# Patient Record
Sex: MALE | Race: WHITE | NOT HISPANIC OR LATINO | Employment: FULL TIME | URBAN - METROPOLITAN AREA
[De-identification: names, ages, dates, MRNs, and addresses within clinical notes are randomized per-mention and may not be internally consistent; named-entity substitution may affect disease eponyms.]

---

## 2017-03-07 DIAGNOSIS — C73 MALIGNANT NEOPLASM OF THYROID GLAND (H): ICD-10-CM

## 2017-03-07 DIAGNOSIS — R25.1 TREMOR: ICD-10-CM

## 2017-03-07 DIAGNOSIS — E89.0 POSTSURGICAL HYPOTHYROIDISM: ICD-10-CM

## 2017-03-07 RX ORDER — LEVOTHYROXINE SODIUM 125 UG/1
TABLET ORAL
Qty: 180 TABLET | Refills: 3 | Status: SHIPPED
Start: 2017-03-07 | End: 2017-12-20

## 2017-03-07 NOTE — TELEPHONE ENCOUNTER
levothyroxine (SYNTHROID, LEVOTHROID) 125 MCG tablet      Last Written Prescription Date:  3/11/2016  Last Fill Quantity: 180,   # refills: 2  Last Office Visit : 4/18/2016  Future Office visit:  Not scheduled    Routing refill request to provider for review/approval because:  Per protocol, provider request.

## 2017-12-20 ENCOUNTER — OFFICE VISIT (OUTPATIENT)
Dept: ENDOCRINOLOGY | Facility: CLINIC | Age: 42
End: 2017-12-20
Payer: COMMERCIAL

## 2017-12-20 VITALS
HEART RATE: 56 BPM | WEIGHT: 222.9 LBS | BODY MASS INDEX: 25.79 KG/M2 | SYSTOLIC BLOOD PRESSURE: 135 MMHG | HEIGHT: 78 IN | DIASTOLIC BLOOD PRESSURE: 82 MMHG

## 2017-12-20 DIAGNOSIS — E89.0 POSTSURGICAL HYPOTHYROIDISM: ICD-10-CM

## 2017-12-20 DIAGNOSIS — C73 PAPILLARY THYROID CARCINOMA (H): Primary | ICD-10-CM

## 2017-12-20 DIAGNOSIS — C73 PAPILLARY THYROID CARCINOMA (H): ICD-10-CM

## 2017-12-20 DIAGNOSIS — R25.1 TREMOR: ICD-10-CM

## 2017-12-20 LAB
T4 FREE SERPL-MCNC: 1.52 NG/DL (ref 0.76–1.46)
TSH SERPL DL<=0.005 MIU/L-ACNC: 0.02 MU/L (ref 0.4–4)

## 2017-12-20 RX ORDER — LEVOTHYROXINE SODIUM 125 UG/1
TABLET ORAL
Qty: 180 TABLET | Refills: 3 | Status: SHIPPED | OUTPATIENT
Start: 2017-12-20 | End: 2019-02-04

## 2017-12-20 RX ORDER — LEVOTHYROXINE SODIUM 125 UG/1
TABLET ORAL
Qty: 180 TABLET | Refills: 3 | Status: SHIPPED | OUTPATIENT
Start: 2017-12-20 | End: 2017-12-20

## 2017-12-20 NOTE — LETTER
12/20/2017       RE: José Augustine  [address] [facility] [address]     Dear Colleague,    Thank you for referring your patient, José Augustine, to the [facility] ENDOCRINOLOGY at [facility]. Please see a copy of my visit note below.    Endocrinology Consult Note    Attending ASSESSMENT/PLAN:     1. papillary thyroid carcinoma, 1.5 cm with extrathyroid extension, node positive, BRAF mutant positive.    pT3, pN1a, pMx, cM0  JESUS recurrence risk intermediate  Labs today  Neck US   RTC 1 year    2.  Post surgical hypothyroidism.    Treat to TSH < 0.4  Refilled LT4 x 1 year    3.  Tremor - as per #2      Nettie Carolina MD      Cc/ HISTORY OF PRESENT ILLNESS José presents for follow up ofthyroid cancer, post surgical hypothyroidism.He had + BRAF mutation on the FNAB specimen. He was last seen by me 4/16.      On 12/9/13 he was treated with Total thyroidectomy, central neck dissection removing right PCT 1.5 cm, extending to extrathyroidal fatty tissue (foca ETE).  6/10 level 6 LN were positive for PCT.   MACIS:Total 4.55 if invasion and no distant  pT3, pN1a, pMx   On 8/13/14 he was treated with 131I 100 mCi after 2 dose Thyrogen stimulation.  On 11/12/15 thyrogen stimulated 3 mCi 123I TBS was negative. Day 5 thyrogen stimulated Tg was  048 with TSH 16.4.  At our last appt he was on LT4 250 mcg/day. After that, we reduced the dose to 250 * 6.5/week, divided .       We have the following relevant lab data:  6/30/11 TSh 0.99  10/3/12: vitamin D 16.4  9/4/13: TSh 1.07, calcium 9.1, BUN17, creatinin0.85, alk phos 54, vitamin D 41.7  1/21/14: Tg 0.48, JESUS < 0.4, TSH 9.77  5/20/14: Tg 0.31, JESUS < 0.4, TSH1.02  8/13/14: Tg 0.89, JESUS < 0.4,  -  8/18/14 post therapy scan:  neck uptake  10/14/14: Tg 0.14, JESUS < 0.4, TSH 1.19  12/19/14 TSH 0.3, free T4 1.39  4/14/15: Tg 0.17, JESUS < 0.4, TSH 0.07  11/13/15: Tg 0.48, JESUS < 0.4, TSH 16,40  4/18/16: Tg 0.14, JESUS < 0.4,    Neck US  2016 compared with 4/15  Right level 4 # 1 0.6 x 0.3 x 0.4 (was 0.5 x 0.3 x 0.4) -  Right level 4 # 2 0.8 x 0.3 x 0.7 ( was 0.8 x 0.3 x 0.8 )  Right level 5 # 1 0.6 x 0.4 x 0.7 hilum (was 0.5 x 0.4 x 0.6 cm  )  Left level 3 # 1 0.9 x0.3 x 1.5 ( was 1.0 x 0.4 x 1.3 cm   Left level 3 # 2  0.6 x0.2 x 0.9 (was0.4 x 0.2 x 0.5 cm     REVIEW OF SYSTEMS  Feels normal   Energy is good  No sleep problem  Cardiac: negative  Respiratory: negative  GI: negative  No pains    Past Medical History  Past Medical History:   Diagnosis Date     Allergic state      Asthma age 12     Eczema      Papillary thyroid carcinoma (H) 12/9/13    right 1.5 cm, ETE, LN+, BRAF+     Postsurgical hypothyroidism 12/9/13     Thyroid nodule        Past Surgical History:   Procedure Laterality Date     ORTHOPEDIC SURGERY      shoulder labral repair     THYROIDECTOMY  12/9/2013    Procedure: THYROIDECTOMY;  Total Thyroidectomy, Central Neck Dissection ;  Surgeon: Pillo Henley MD;  Location: [facility] OR       Medications  Current Outpatient Prescriptions   Medication Sig Dispense Refill     VITAMIN D, CHOLECALCIFEROL, PO Take 2,000 Units by mouth daily       Multiple Vitamins-Minerals (MULTIVITAMIN ADULT PO)        Probiotic Product (PROBIOTIC ADVANCED PO)        levothyroxine (SYNTHROID/LEVOTHROID) 125 MCG tablet Monday-Saturday: (2 tablet/day); Sunday: (1 tablet) 180 tablet 3     albuterol (PROAIR HFA, PROVENTIL HFA, VENTOLIN HFA) 108 (90 BASE) MCG/ACT inhaler Inhale 2 puffs into the lungs every 6 hours as needed for shortness of breath / dyspnea or wheezing       beclomethasone (QVAR) 40 MCG/ACT Inhaler Inhale 1 puff into the lungs daily       [DISCONTINUED] levothyroxine (SYNTHROID/LEVOTHROID) 125 MCG tablet Monday-Saturday: (2 tablet/day);   Sunday: (1 tablet) 180 tablet 3     [DISCONTINUED] levothyroxine (SYNTHROID/LEVOTHROID) 125 MCG tablet Monday-Saturday: (2 tablet/day);   Sunday: (1 tablet) 180 tablet 3     Allergies  No Known Allergies      Family  "History  family history includes CANCER in his father. There is no history of Thyroid Cancer or Thyroid Disease.    Social History  Social History   Substance Use Topics     Smoking status: Never Smoker     Smokeless tobacco: Never Used     Alcohol use Yes      Comment: weekly       Physical Exam  /82 (BP Location: Right arm, Patient Position: Sitting, Cuff Size: Adult Regular)  Pulse 56  Ht 2.032 m (6' 8\")  Wt 101.1 kg (222 lb 14.4 oz)  BMI 24.49 kg/m2  Body mass index is 24.49 kg/(m^2).  GENERAL : pleasant young man  In no apparent distress.  He is alone  SKIN: Normal color, normal temperature,   NECK: no palpable masses  EYES: PER,  No scleral icterus,   RESP: Lungs clear to auscultation bilaterally  CARDIAC: Regular rate and rhythm, normal S1 S2, without murmurs, rubs or gallops    NEURO: awake, alert, responds appropriately to questions.   Moves all extremities;  + subtle  tremor of outstretched hand is again seen  EXTREMITIES: No clubbing, cyanosis or edema.      DATA REVIEW    ENDO THYROID LABS-[facility] Latest Ref Rng & Units 6/27/2016 4/18/2016   TSH 0.40 - 4.00 mU/L 0.01 (L) 0.02 (L)   T4 FREE 0.76 - 1.46 ng/dL 1.48 (H) 1.57 (H)     ENDO THYROID LABS-[facility] Latest Ref Rng & Units 11/13/2015   TSH 0.40 - 4.00 mU/L 16.40 (H)   T4 FREE 0.76 - 1.46 ng/dL        "

## 2017-12-20 NOTE — PROGRESS NOTES
Endocrinology Consult Note    Attending ASSESSMENT/PLAN:     1. papillary thyroid carcinoma, 1.5 cm with extrathyroid extension, node positive, BRAF mutant positive.    pT3, pN1a, pMx, cM0  JESUS recurrence risk intermediate  Labs today  Neck US   RTC 1 year    Addendum: review of neck US images dated 12/26/17: I agree the study is negative     2.  Post surgical hypothyroidism.    Treat to TSH < 0.4  Refilled LT4 x 1 year    3.  Tremor - as per #2      Nettie Carolina MD      Cc/ HISTORY OF PRESENT ILLNESS José presents for follow up ofthyroid cancer, post surgical hypothyroidism.He had + BRAF mutation on the FNAB specimen. He was last seen by me 4/16.      On 12/9/13 he was treated with Total thyroidectomy, central neck dissection removing right PCT 1.5 cm, extending to extrathyroidal fatty tissue (foca ETE).  6/10 level 6 LN were positive for PCT.   MACIS:Total 4.55 if invasion and no distant  pT3, pN1a, pMx   On 8/13/14 he was treated with 131I 100 mCi after 2 dose Thyrogen stimulation.  On 11/12/15 thyrogen stimulated 3 mCi 123I TBS was negative. Day 5 thyrogen stimulated Tg was  048 with TSH 16.4.  At our last appt he was on LT4 250 mcg/day. After that, we reduced the dose to 250 * 6.5/week, divided .       We have the following relevant lab data:  6/30/11 TSh 0.99  10/3/12: vitamin D 16.4  9/4/13: TSh 1.07, calcium 9.1, BUN17, creatinin0.85, alk phos 54, vitamin D 41.7  1/21/14: Tg 0.48, JESUS < 0.4, TSH 9.77  5/20/14: Tg 0.31, JESUS < 0.4, TSH1.02  8/13/14: Tg 0.89, JESUS < 0.4,  -  8/18/14 post therapy scan:  neck uptake  10/14/14: Tg 0.14, JESUS < 0.4, TSH 1.19  12/19/14 TSH 0.3, free T4 1.39  4/14/15: Tg 0.17, JESUS < 0.4, TSH 0.07  11/13/15: Tg 0.48, JESUS < 0.4, TSH 16,40  4/18/16: Tg 0.14, JESUS < 0.4,  12/20/17: Tg 0.16, JESUS < 0.4, TSH 0.02, free T4 1.57- results followed appt and were not discussed at appt.     Neck US 2016 compared with 4/15  Right level 4 # 1 0.6 x 0.3 x 0.4 (was 0.5 x 0.3 x 0.4) -  Right  level 4 # 2 0.8 x 0.3 x 0.7 ( was 0.8 x 0.3 x 0.8 )  Right level 5 # 1 0.6 x 0.4 x 0.7 hilum (was 0.5 x 0.4 x 0.6 cm  )  Left level 3 # 1 0.9 x0.3 x 1.5 ( was 1.0 x 0.4 x 1.3 cm   Left level 3 # 2  0.6 x0.2 x 0.9 (was0.4 x 0.2 x 0.5 cm     REVIEW OF SYSTEMS  Feels normal   Energy is good  No sleep problem  Cardiac: negative  Respiratory: negative  GI: negative  No pains    Past Medical History  Past Medical History:   Diagnosis Date     Allergic state      Asthma age 12     Eczema      Papillary thyroid carcinoma (H) 12/9/13    right 1.5 cm, ETE, LN+, BRAF+     Postsurgical hypothyroidism 12/9/13     Thyroid nodule        Past Surgical History:   Procedure Laterality Date     ORTHOPEDIC SURGERY      shoulder labral repair     THYROIDECTOMY  12/9/2013    Procedure: THYROIDECTOMY;  Total Thyroidectomy, Central Neck Dissection ;  Surgeon: Pillo Henley MD;  Location:  OR       Medications  Current Outpatient Prescriptions   Medication Sig Dispense Refill     VITAMIN D, CHOLECALCIFEROL, PO Take 2,000 Units by mouth daily       Multiple Vitamins-Minerals (MULTIVITAMIN ADULT PO)        Probiotic Product (PROBIOTIC ADVANCED PO)        levothyroxine (SYNTHROID/LEVOTHROID) 125 MCG tablet Monday-Saturday: (2 tablet/day); Sunday: (1 tablet) 180 tablet 3     albuterol (PROAIR HFA, PROVENTIL HFA, VENTOLIN HFA) 108 (90 BASE) MCG/ACT inhaler Inhale 2 puffs into the lungs every 6 hours as needed for shortness of breath / dyspnea or wheezing       beclomethasone (QVAR) 40 MCG/ACT Inhaler Inhale 1 puff into the lungs daily       [DISCONTINUED] levothyroxine (SYNTHROID/LEVOTHROID) 125 MCG tablet Monday-Saturday: (2 tablet/day);   Sunday: (1 tablet) 180 tablet 3     [DISCONTINUED] levothyroxine (SYNTHROID/LEVOTHROID) 125 MCG tablet Monday-Saturday: (2 tablet/day);   Sunday: (1 tablet) 180 tablet 3     Allergies  No Known Allergies      Family History  family history includes CANCER in his father. There is no history of Thyroid Cancer  "or Thyroid Disease.    Social History  Social History   Substance Use Topics     Smoking status: Never Smoker     Smokeless tobacco: Never Used     Alcohol use Yes      Comment: weekly       Physical Exam  /82 (BP Location: Right arm, Patient Position: Sitting, Cuff Size: Adult Regular)  Pulse 56  Ht 2.032 m (6' 8\")  Wt 101.1 kg (222 lb 14.4 oz)  BMI 24.49 kg/m2  Body mass index is 24.49 kg/(m^2).  GENERAL : pleasant young man  In no apparent distress.  He is alone  SKIN: Normal color, normal temperature,   NECK: no palpable masses  EYES: PER,  No scleral icterus,   RESP: Lungs clear to auscultation bilaterally  CARDIAC: Regular rate and rhythm, normal S1 S2, without murmurs, rubs or gallops    NEURO: awake, alert, responds appropriately to questions.   Moves all extremities;  + subtle  tremor of outstretched hand is again seen  EXTREMITIES: No clubbing, cyanosis or edema.      DATA REVIEW    Results for LALA SANTANA (MRN [id number]) as of 12/28/2017 15:19   Ref. Range 12/20/2017 15:37   T4 Free Latest Ref Range: 0.76 - 1.46 ng/dL 1.52 (H)   TSH Latest Ref Range: 0.40 - 4.00 mU/L 0.02 (L)     ENDO THYROID LABS-[facility] Latest Ref Rng & Units 6/27/2016 4/18/2016   TSH 0.40 - 4.00 mU/L 0.01 (L) 0.02 (L)   T4 FREE 0.76 - 1.46 ng/dL 1.48 (H) 1.57 (H)     ENDO THYROID LABS-[facility] Latest Ref Rng & Units 11/13/2015   TSH 0.40 - 4.00 mU/L 16.40 (H)   T4 FREE 0.76 - 1.46 ng/dL        EXAMINATION: US HEAD NECK SOFT TISSUE, 12/26/2017 9:49 AM      COMPARISON: 4/18/2016     HISTORY: Papillary thyroid carcinoma (H); Postsurgical hypothyroidism;  Tremor     FINDINGS:     Lymph nodes are measured bilaterally with measurements given in  craniocaudal, transverse and AP dimensions as follows:     Right:  Level 1: None  Level 2: 3 lymph nodes, with lymph node #1 measuring 0.9 x 0.5 x 1.1  cm, previously 1.2 x 0.5 x 1.5 cm. Lymph node #2 measuring 1.1 x 0.6 x  1.6 cm, previously 1.1 x 0.5 x 1.5 cm. Lymph node #3 measuring 1.1 " x  0.5 x 1.1 cm, previously 1.4 x 0.4 x 0.9 cm.  Level 3: Lymph node #1 measuring 1.4 x 0.5 x 2.6 cm, previously not  seen. Lymph node #2 insignificant by size criteria.  Level 4: None  Level 5: None  Level 6: None     Left:  Level 1: None  Level 2: 2 lymph nodes, with lymph node #1 measuring 1.1 x 0.8 x 1.5  cm, previously 1.1 x 0.6 x 1.1 cm. lymph node #2 measuring 1.3 x 0.5 x  2.0 cm, previously 1.4 x 0.5 x 2.0 cm.   Level 3: 2 small lymph nodes, insignificant by size criteria.  Level 4: None  Level 5: None  Level 6: None     All the lymph nodes demonstrate normal fatty hilum. No soft tissue  lesion at the thyroid bed.         IMPRESSION: Soft tissue neck ultrasound with lymph node measurements  as described above. All the lymph nodes show normal fatty hilum.     I have personally reviewed the examination and initial interpretation  and I agree with the findings.     RICK HANSON MD

## 2017-12-20 NOTE — MR AVS SNAPSHOT
After Visit Summary   12/20/2017    José Augustine    MRN: [id number]           Patient Information     Date Of Birth          1975        Visit Information        Provider Department      12/20/2017 3:00 PM Nettie Carolina MD [facility] Endocrinology        Today's Diagnoses     Papillary thyroid carcinoma    -  1    Postsurgical hypothyroidism        Tremor          Care Instructions    You should continue to see me about once/year or sooner if needed          Follow-ups after your visit        Follow-up notes from your care team     Return in about 1 year (around 12/20/2018).      Your next 10 appointments already scheduled     Dec 20, 2017  4:00 PM CST   LAB with [facility] ([facility])    [address] [place] [address] [id number] [phone]           Please do not eat 10-12 hours before your appointment if you are coming in fasting for labs on lipids, cholesterol, or glucose (sugar). This does not apply to pregnant women. Water, hot tea and black coffee (with nothing added) are okay. Do not drink other fluids, diet soda or chew gum.            Dec 26, 2017  9:15 AM CST   (Arrive by 9:00 AM)   US HEAD NECK SOFT TISSUE with [address] [facility] ([facility])    [address] [place] [address] [id number] [phone]           Please bring a list of your medicines (including vitamins, minerals and over-the-counter drugs). Also, tell your doctor about any allergies you may have. Wear comfortable clothes and leave your valuables at home.  You do not need to do anything special to prepare for your exam.  Please call the Imaging Department at your exam site with any questions.              Future tests that were ordered for you today     Open Future Orders        Priority Expected Expires Ordered    US head neck soft tissue Routine  7/18/2018 12/20/2017    TSH Routine  12/20/2018  "12/20/2017    T4 free Routine  12/20/2018 12/20/2017    Thyroglobulin (Total, antibody & recovery %) Routine  12/20/2018 12/20/2017            Who to contact     Please call your clinic at [phone] to:    Ask questions about your health    Make or cancel appointments    Discuss your medicines    Learn about your test results    Speak to your doctor   If you have compliments or concerns about an experience at your clinic, or if you wish to file a complaint, please contact [facility] Patient Relations at [phone] or email us at [email or web address]         Additional Information About Your Visit        [employer] Information     [employer] gives you secure access to your electronic health record. If you see a primary care provider, you can also send messages to your care team and make appointments. If you have questions, please call your primary care clinic.  If you do not have a primary care provider, please call [phone] and they will assist you.      [employer] is an electronic gateway that provides easy, online access to your medical records. With [employer], you can request a clinic appointment, read your test results, renew a prescription or communicate with your care team.     To access your existing account, please contact your [facility] or call [phone] for assistance.        Care EveryWhere ID     This is your Care EveryWhere ID. This could be used by other organizations to access your [facility] medical records  [id number]        Your Vitals Were     Pulse Height BMI (Body Mass Index)             56 2.032 m (6' 8\") 24.49 kg/m2          Blood Pressure from Last 3 Encounters:   12/20/17 135/82   04/18/16 117/74   11/17/15 113/73    Weight from Last 3 Encounters:   12/20/17 101.1 kg (222 lb 14.4 oz)   04/18/16 94.8 kg (209 lb)   11/17/15 93.6 kg (206 lb 6.4 oz)                 Today's Medication Changes          These changes are " accurate as of: 12/20/17  3:21 PM.  If you have any questions, ask your nurse or doctor.               Start taking these medicines.        Dose/Directions    levothyroxine 125 MCG tablet   Commonly known as:  SYNTHROID/LEVOTHROID   Used for:  Postsurgical hypothyroidism, Tremor, Papillary thyroid carcinoma (H)   Started by:  Nettie Carolina MD        Monday-Saturday: (2 tablet/day); Sunday: (1 tablet)   Quantity:  180 tablet   Refills:  3            Where to get your medicines      These medications were sent to [facility] home delivery ([facility] #[id number]) - [facility] [place] - [address]  [address], [place] [address]     Phone:  [phone]     levothyroxine 125 MCG tablet                Primary Care Provider Office Phone # Fax #    Windy Torrez [phone]       [facility] [address]  [facility] [address]        Equal Access to Services     ZAINAB RODRIGUEZ : Hadii kimberly quintero hadasho Solannyali, waaxda luqadaha, qaybta kaalmada adeegyada, sonia bradshaw . So [facility] [phone].    ATENCIÓN: Si habla español, tiene a bernal disposición servicios gratuitos de asistencia lingüística. Mariahtaylor al [phone].    We comply with applicable federal civil rights laws and [place] laws. We do not discriminate on the basis of race, color, national origin, age, disability, sex, sexual orientation, or gender identity.            Thank you!     Thank you for choosing [facility] ENDOCRINOLOGY  for your care. Our goal is always to provide you with excellent care. Hearing back from our patients is one way we can continue to improve our services. Please take a few minutes to complete the written survey that you may receive in the mail after your visit with us. Thank you!             Your Updated Medication List - Protect others around you: Learn how to safely use, store and throw away your medicines at www.disposemymeds.org.          This list is accurate as of: 12/20/17   3:21 PM.  Always use your most recent med list.                   Brand Name Dispense Instructions for use Diagnosis    albuterol 108 (90 BASE) MCG/ACT Inhaler    PROAIR HFA/PROVENTIL HFA/VENTOLIN HFA     Inhale 2 puffs into the lungs every 6 hours as needed for shortness of breath / dyspnea or wheezing    Papillary thyroid carcinoma (H), Postsurgical hypothyroidism       beclomethasone 40 MCG/ACT Inhaler    QVAR     Inhale 1 puff into the lungs daily    Papillary thyroid carcinoma (H), Postsurgical hypothyroidism       levothyroxine 125 MCG tablet    SYNTHROID/LEVOTHROID    180 tablet    Monday-Saturday: (2 tablet/day); Sunday: (1 tablet)    Postsurgical hypothyroidism, Tremor, Papillary thyroid carcinoma (H)       MULTIVITAMIN ADULT PO       Papillary thyroid carcinoma (H), Postsurgical hypothyroidism, Tremor       PROBIOTIC ADVANCED PO       Papillary thyroid carcinoma (H), Postsurgical hypothyroidism, Tremor       VITAMIN D (CHOLECALCIFEROL) PO      Take 2,000 Units by mouth daily    Papillary thyroid carcinoma (H), Postsurgical hypothyroidism, Tremor

## 2017-12-20 NOTE — NURSING NOTE
"Chief Complaint   Patient presents with     RECHECK     POSTSURGICAL HYPOTHYROIDISM F/U        Initial /82 (BP Location: Right arm, Patient Position: Sitting, Cuff Size: Adult Regular)  Pulse 56  Ht 2.032 m (6' 8\")  Wt 101.1 kg (222 lb 14.4 oz)  BMI 24.49 kg/m2 Estimated body mass index is 24.49 kg/(m^2) as calculated from the following:    Height as of this encounter: 2.032 m (6' 8\").    Weight as of this encounter: 101.1 kg (222 lb 14.4 oz).  Medication Reconciliation: complete    "

## 2017-12-26 ENCOUNTER — RADIANT APPOINTMENT (OUTPATIENT)
Dept: ULTRASOUND IMAGING | Facility: CLINIC | Age: 42
End: 2017-12-26
Payer: COMMERCIAL

## 2017-12-26 DIAGNOSIS — R25.1 TREMOR: ICD-10-CM

## 2017-12-26 DIAGNOSIS — E89.0 POSTSURGICAL HYPOTHYROIDISM: ICD-10-CM

## 2017-12-26 DIAGNOSIS — C73 PAPILLARY THYROID CARCINOMA (H): ICD-10-CM

## 2017-12-28 LAB — LAB SCANNED RESULT: NORMAL

## 2019-02-04 ENCOUNTER — OFFICE VISIT (OUTPATIENT)
Dept: ENDOCRINOLOGY | Facility: CLINIC | Age: 44
End: 2019-02-04
Payer: COMMERCIAL

## 2019-02-04 VITALS
BODY MASS INDEX: 27.69 KG/M2 | HEART RATE: 75 BPM | SYSTOLIC BLOOD PRESSURE: 142 MMHG | HEIGHT: 78 IN | WEIGHT: 239.3 LBS | DIASTOLIC BLOOD PRESSURE: 76 MMHG

## 2019-02-04 DIAGNOSIS — E89.0 POSTSURGICAL HYPOTHYROIDISM: Chronic | ICD-10-CM

## 2019-02-04 DIAGNOSIS — C73 PAPILLARY THYROID CARCINOMA (H): Primary | ICD-10-CM

## 2019-02-04 DIAGNOSIS — C73 PAPILLARY THYROID CARCINOMA (H): ICD-10-CM

## 2019-02-04 LAB
T4 FREE SERPL-MCNC: 1.02 NG/DL (ref 0.76–1.46)
TSH SERPL DL<=0.005 MIU/L-ACNC: 1.4 MU/L (ref 0.4–4)

## 2019-02-04 RX ORDER — LEVOTHYROXINE SODIUM 125 UG/1
TABLET ORAL
Qty: 180 TABLET | Refills: 3 | Status: SHIPPED | OUTPATIENT
Start: 2019-02-04 | End: 2019-02-05

## 2019-02-04 ASSESSMENT — PAIN SCALES - GENERAL: PAINLEVEL: NO PAIN (0)

## 2019-02-04 ASSESSMENT — MIFFLIN-ST. JEOR: SCORE: 2155.46

## 2019-02-04 NOTE — PROGRESS NOTES
Endocrinology Consult Note    Attending ASSESSMENT/PLAN:     1. papillary thyroid carcinoma, 1.5 cm with extrathyroid extension, node positive, BRAF mutant positive.    pT3, pN1a, pMx, cM0  JESUS recurrence risk intermediate  Labs today  RTC 1 year with neck US prior    2.  Post surgical hypothyroidism.    Treat to TSH < 0.4  Refilled LT4 x 1 year  Labs following appt show TSH higher than target.  Will remind to use pill tray. Await Tg before change in dose.     Nettie Carolina MD      Cc/ HISTORY OF PRESENT ILLNESS José presents for follow up ofthyroid cancer, post surgical hypothyroidism.He had + BRAF mutation on the FNAB specimen. He was last seen by me 12/17      His thyroid cancer treatment course has been as follows:   12/9/13 Total thyroidectomy, central neck dissection removing right PCT 1.5 cm, extending to extrathyroidal fatty tissue (foca ETE).  6/10 level 6 LN were positive for PCT.   MACIS:Total 4.55 if invasion and no distant  pT3, pN1a, pMx  8/13/14 131I 100 mCi after 2 dose Thyrogen stimulation.    11/12/15 thyrogen stimulated 3 mCi 123I TBS  negative. Day 5 thyrogen stimulated Tg was  048 with TSH 16.4.      At our last appt he was on LT4 250 * 6.5/week, divided .  He continues on this dose.     We have the following relevant lab data:  6/30/11 TSh 0.99  10/3/12: vitamin D 16.4  9/4/13: TSh 1.07, calcium 9.1, BUN17, creatinin0.85, alk phos 54, vitamin D 41.7  1/21/14: Tg 0.48, JESUS < 0.4, TSH 9.77  5/20/14: Tg 0.31, JESUS < 0.4, TSH1.02  8/13/14: Tg 0.89, JESUS < 0.4,  -  8/18/14 post therapy scan:  neck uptake  10/14/14: Tg 0.14, JESUS < 0.4, TSH 1.19  12/19/14 TSH 0.3, free T4 1.39  4/14/15: Tg 0.17, JESUS < 0.4, TSH 0.07  11/13/15: Tg 0.48, JESUS < 0.4, TSH 16,40  4/18/16: Tg 0.14, JESUS < 0.4,  12/20/17: Tg 0.16, JESUS < 0.4, TSH 0.02, free T4 1.57-   2/4/19: Tg 0.14, JESUS < 0.4, TSH 1.4, free T4 1.02 - results followed appt , not discussed at appt     Neck US 12/26/17- as reviewed by me again  "today  Right level 3 # 1 1.4 x 0.5 x 2.6 cm Echogenic hilum  Left level 3   Left level 3     REVIEW OF SYSTEMS  Weight is up 17 lbs -   Sleep is OK  Cardiac: negative  Respiratory: exercise is weights 2 times/week, walks 1-2 times/week  GI: negative; diarrhae; stomach always a little touchy  Hurt back summer, 2018 - using elliptical less    Past Medical History  Past Medical History:   Diagnosis Date     Allergic state      Asthma age 12     Eczema      Papillary thyroid carcinoma (H) 12/9/13    right 1.5 cm, ETE, LN+, BRAF+     Postsurgical hypothyroidism 12/9/13     Thyroid nodule        Past Surgical History:   Procedure Laterality Date     ORTHOPEDIC SURGERY      shoulder labral repair     THYROIDECTOMY  12/9/2013    Procedure: THYROIDECTOMY;  Total Thyroidectomy, Central Neck Dissection ;  Surgeon: Pillo Henley MD;  Location:  OR       Medications  Current Outpatient Medications   Medication Sig Dispense Refill     albuterol (PROAIR HFA, PROVENTIL HFA, VENTOLIN HFA) 108 (90 BASE) MCG/ACT inhaler Inhale 2 puffs into the lungs every 6 hours as needed for shortness of breath / dyspnea or wheezing       beclomethasone (QVAR) 40 MCG/ACT Inhaler Inhale 1 puff into the lungs daily       levothyroxine (SYNTHROID/LEVOTHROID) 125 MCG tablet Monday-Saturday: (2 tablet/day); Sunday: (1 tablet) 180 tablet 3     Probiotic Product (PROBIOTIC ADVANCED PO)        Allergies  No Known Allergies      Family History  family history includes Cancer in his father; Diabetes in his unknown relative.    Social History  Social History     Tobacco Use     Smoking status: Never Smoker     Smokeless tobacco: Never Used   Substance Use Topics     Alcohol use: Yes     Comment: weekly     Drug use: No       Physical Exam  /76   Pulse 75   Ht 2.032 m (6' 8\")   Wt 108.5 kg (239 lb 4.8 oz)   BMI 26.29 kg/m    Body mass index is 26.29 kg/m .  GENERAL : pleasant young man  In no apparent distress.  He is alone  SKIN: Normal color, " normal temperature,   NECK: no palpable masses  EYES: PER,  No scleral icterus,  NECK: no palpable masses ;he reports minimal tenderness right neck   RESP: Lungs clear to auscultation bilaterally  CARDIAC: Regular rate and rhythm, normal S1 S2, without murmurs, rubs or gallops    NEURO: awake, alert, responds appropriately to questions.   Moves all extremities;  no  tremor of outstretched hand   EXTREMITIES: No clubbing, cyanosis or edema.      DATA REVIEW    Results for LALA SANTANA (MRN [id number]) as of 2/4/2019 21:49   Ref. Range 12/20/2017 15:37 12/26/2017 09:49 2/4/2019 15:39   T4 Free Latest Ref Range: 0.76 - 1.46 ng/dL 1.52 (H)  1.02   TSH Latest Ref Range: 0.40 - 4.00 mU/L 0.02 (L)  1.40     HISTORY: Papillary thyroid carcinoma (H); Postsurgical hypothyroidism;  Tremor     FINDINGS:     Lymph nodes are measured bilaterally with measurements given in  craniocaudal, transverse and AP dimensions as follows:     Right:  Level 1: None  Level 2: 3 lymph nodes, with lymph node #1 measuring 0.9 x 0.5 x 1.1  cm, previously 1.2 x 0.5 x 1.5 cm. Lymph node #2 measuring 1.1 x 0.6 x  1.6 cm, previously 1.1 x 0.5 x 1.5 cm. Lymph node #3 measuring 1.1 x  0.5 x 1.1 cm, previously 1.4 x 0.4 x 0.9 cm.  Level 3: Lymph node #1 measuring 1.4 x 0.5 x 2.6 cm, previously not  seen. Lymph node #2 insignificant by size criteria.  Level 4: None  Level 5: None  Level 6: None     Left:  Level 1: None  Level 2: 2 lymph nodes, with lymph node #1 measuring 1.1 x 0.8 x 1.5  cm, previously 1.1 x 0.6 x 1.1 cm. lymph node #2 measuring 1.3 x 0.5 x  2.0 cm, previously 1.4 x 0.5 x 2.0 cm.   Level 3: 2 small lymph nodes, insignificant by size criteria.  Level 4: None  Level 5: None  Level 6: None     All the lymph nodes demonstrate normal fatty hilum. No soft tissue  lesion at the thyroid bed.                                                                      IMPRESSION: Soft tissue neck ultrasound with lymph node measurements  as described  above. All the lymph nodes show normal fatty hilum.     I have personally reviewed the examination and initial interpretation  and I agree with the findings.     RICK HANSON MD

## 2019-02-04 NOTE — LETTER
2/4/2019       RE: José Augustine  [address] [facility] [address]     Dear Colleague,    Thank you for referring your patient, José Augustine, to the [facility] ENDOCRINOLOGY at [facility]. Please see a copy of my visit note below.    Endocrinology Consult Note    Attending ASSESSMENT/PLAN:     1. papillary thyroid carcinoma, 1.5 cm with extrathyroid extension, node positive, BRAF mutant positive.    pT3, pN1a, pMx, cM0  JESUS recurrence risk intermediate  Labs today  RTC 1 year with neck US prior    2.  Post surgical hypothyroidism.    Treat to TSH < 0.4  Refilled LT4 x 1 year  Labs following appt show TSH higher than target.  Will remind to use pill tray. Await Tg before change in dose.     Nettie Carolina MD      Cc/ HISTORY OF PRESENT ILLNESS José presents for follow up ofthyroid cancer, post surgical hypothyroidism.He had + BRAF mutation on the FNAB specimen. He was last seen by me 12/17      His thyroid cancer treatment course has been as follows:   12/9/13 Total thyroidectomy, central neck dissection removing right PCT 1.5 cm, extending to extrathyroidal fatty tissue (foca ETE).  6/10 level 6 LN were positive for PCT.   MACIS:Total 4.55 if invasion and no distant  pT3, pN1a, pMx  8/13/14 131I 100 mCi after 2 dose Thyrogen stimulation.    11/12/15 thyrogen stimulated 3 mCi 123I TBS  negative. Day 5 thyrogen stimulated Tg was  048 with TSH 16.4.      At our last appt he was on LT4 250 * 6.5/week, divided .  He continues on this dose.     We have the following relevant lab data:  6/30/11 TSh 0.99  10/3/12: vitamin D 16.4  9/4/13: TSh 1.07, calcium 9.1, BUN17, creatinin0.85, alk phos 54, vitamin D 41.7  1/21/14: Tg 0.48, JESUS < 0.4, TSH 9.77  5/20/14: Tg 0.31, JESUS < 0.4, TSH1.02  8/13/14: Tg 0.89, JESUS < 0.4,  -  8/18/14 post therapy scan:  neck uptake  10/14/14: Tg 0.14, JESUS < 0.4, TSH 1.19  12/19/14 TSH 0.3, free T4 1.39  4/14/15: Tg 0.17, JESUS < 0.4, TSH  "0.07  11/13/15: Tg 0.48, JESUS < 0.4, TSH 16,40  4/18/16: Tg 0.14, JESUS < 0.4,  12/20/17: Tg 0.16, JESUS < 0.4, TSH 0.02, free T4 1.57-     Neck US 12/26/17- as reviewed by me again today  Right level 3 # 1 1.4 x 0.5 x 2.6 cm Echogenic hilum  Left level 3   Left level 3     REVIEW OF SYSTEMS  Weight is up 17 lbs -   Sleep is OK  Cardiac: negative  Respiratory: exercise is weights 2 times/week, walks 1-2 times/week  GI: negative; diarrhae; stomach always a little touchy  Hurt back summer, 2018 - using elliptical less    Past Medical History  Past Medical History:   Diagnosis Date     Allergic state      Asthma age 12     Eczema      Papillary thyroid carcinoma (H) 12/9/13    right 1.5 cm, ETE, LN+, BRAF+     Postsurgical hypothyroidism 12/9/13     Thyroid nodule        Past Surgical History:   Procedure Laterality Date     ORTHOPEDIC SURGERY      shoulder labral repair     THYROIDECTOMY  12/9/2013    Procedure: THYROIDECTOMY;  Total Thyroidectomy, Central Neck Dissection ;  Surgeon: Pillo Henley MD;  Location: [facility] OR       Medications  Current Outpatient Medications   Medication Sig Dispense Refill     albuterol (PROAIR HFA, PROVENTIL HFA, VENTOLIN HFA) 108 (90 BASE) MCG/ACT inhaler Inhale 2 puffs into the lungs every 6 hours as needed for shortness of breath / dyspnea or wheezing       beclomethasone (QVAR) 40 MCG/ACT Inhaler Inhale 1 puff into the lungs daily       levothyroxine (SYNTHROID/LEVOTHROID) 125 MCG tablet Monday-Saturday: (2 tablet/day); Sunday: (1 tablet) 180 tablet 3     Probiotic Product (PROBIOTIC ADVANCED PO)        Allergies  No Known Allergies      Family History  family history includes Cancer in his father; Diabetes in his unknown relative.    Social History  Social History     Tobacco Use     Smoking status: Never Smoker     Smokeless tobacco: Never Used   Substance Use Topics     Alcohol use: Yes     Comment: weekly     Drug use: No       Physical Exam  /76   Pulse 75   Ht 2.032 m (6' 8\")   " Wt 108.5 kg (239 lb 4.8 oz)   BMI 26.29 kg/m     Body mass index is 26.29 kg/m .  GENERAL : pleasant young man  In no apparent distress.  He is alone  SKIN: Normal color, normal temperature,   NECK: no palpable masses  EYES: PER,  No scleral icterus,  NECK: no palpable masses ;he reports minimal tenderness right neck   RESP: Lungs clear to auscultation bilaterally  CARDIAC: Regular rate and rhythm, normal S1 S2, without murmurs, rubs or gallops    NEURO: awake, alert, responds appropriately to questions.   Moves all extremities;  no  tremor of outstretched hand   EXTREMITIES: No clubbing, cyanosis or edema.      DATA REVIEW    Results for LALA SANTANA (MRN [id number]) as of 2/4/2019 21:49   Ref. Range 12/20/2017 15:37 12/26/2017 09:49 2/4/2019 15:39   T4 Free Latest Ref Range: 0.76 - 1.46 ng/dL 1.52 (H)  1.02   TSH Latest Ref Range: 0.40 - 4.00 mU/L 0.02 (L)  1.40     HISTORY: Papillary thyroid carcinoma (H); Postsurgical hypothyroidism;  Tremor     FINDINGS:     Lymph nodes are measured bilaterally with measurements given in  craniocaudal, transverse and AP dimensions as follows:     Right:  Level 1: None  Level 2: 3 lymph nodes, with lymph node #1 measuring 0.9 x 0.5 x 1.1  cm, previously 1.2 x 0.5 x 1.5 cm. Lymph node #2 measuring 1.1 x 0.6 x  1.6 cm, previously 1.1 x 0.5 x 1.5 cm. Lymph node #3 measuring 1.1 x  0.5 x 1.1 cm, previously 1.4 x 0.4 x 0.9 cm.  Level 3: Lymph node #1 measuring 1.4 x 0.5 x 2.6 cm, previously not  seen. Lymph node #2 insignificant by size criteria.  Level 4: None  Level 5: None  Level 6: None     Left:  Level 1: None  Level 2: 2 lymph nodes, with lymph node #1 measuring 1.1 x 0.8 x 1.5  cm, previously 1.1 x 0.6 x 1.1 cm. lymph node #2 measuring 1.3 x 0.5 x  2.0 cm, previously 1.4 x 0.5 x 2.0 cm.   Level 3: 2 small lymph nodes, insignificant by size criteria.  Level 4: None  Level 5: None  Level 6: None     All the lymph nodes demonstrate normal fatty hilum. No soft tissue  lesion at  the thyroid bed.                                                                      IMPRESSION: Soft tissue neck ultrasound with lymph node measurements  as described above. All the lymph nodes show normal fatty hilum.     I have personally reviewed the examination and initial interpretation  and I agree with the findings.     RICK HANSON MD    Again, thank you for allowing me to participate in the care of your patient.      Sincerely,    Nettie Carolina MD

## 2019-02-05 DIAGNOSIS — E89.0 POSTSURGICAL HYPOTHYROIDISM: Chronic | ICD-10-CM

## 2019-02-05 DIAGNOSIS — C73 PAPILLARY THYROID CARCINOMA (H): ICD-10-CM

## 2019-02-05 RX ORDER — LEVOTHYROXINE SODIUM 125 UG/1
TABLET ORAL
Qty: 184 TABLET | Refills: 3 | Status: SHIPPED | OUTPATIENT
Start: 2019-02-05 | End: 2019-02-21

## 2019-02-12 LAB — LAB SCANNED RESULT: NORMAL

## 2019-02-17 DIAGNOSIS — C73 PAPILLARY THYROID CARCINOMA (H): ICD-10-CM

## 2019-02-17 DIAGNOSIS — E89.0 POSTSURGICAL HYPOTHYROIDISM: Chronic | ICD-10-CM

## 2019-02-18 RX ORDER — LEVOTHYROXINE SODIUM 125 UG/1
TABLET ORAL
Qty: 180 TABLET | Refills: 0 | OUTPATIENT
Start: 2019-02-18

## 2019-02-21 DIAGNOSIS — C73 PAPILLARY THYROID CARCINOMA (H): ICD-10-CM

## 2019-02-21 DIAGNOSIS — E89.0 POSTSURGICAL HYPOTHYROIDISM: Chronic | ICD-10-CM

## 2019-02-24 RX ORDER — LEVOTHYROXINE SODIUM 125 UG/1
TABLET ORAL
Qty: 180 TABLET | Refills: 2 | Status: SHIPPED | OUTPATIENT
Start: 2019-02-24 | End: 2019-12-04

## 2019-03-17 ENCOUNTER — HOSPITAL ENCOUNTER (EMERGENCY)
Facility: CLINIC | Age: 44
Discharge: HOME | End: 2019-03-17
Attending: EMERGENCY MEDICINE | Admitting: EMERGENCY MEDICINE
Payer: COMMERCIAL

## 2019-03-17 ENCOUNTER — APPOINTMENT (OUTPATIENT)
Dept: GENERAL RADIOLOGY | Facility: CLINIC | Age: 44
End: 2019-03-17
Attending: EMERGENCY MEDICINE
Payer: COMMERCIAL

## 2019-03-17 VITALS
DIASTOLIC BLOOD PRESSURE: 77 MMHG | TEMPERATURE: 96.8 F | OXYGEN SATURATION: 95 % | SYSTOLIC BLOOD PRESSURE: 121 MMHG | HEART RATE: 62 BPM | HEIGHT: 72 IN | WEIGHT: 235 LBS | RESPIRATION RATE: 15 BRPM | BODY MASS INDEX: 31.83 KG/M2

## 2019-03-17 DIAGNOSIS — R07.9 CHEST PAIN, UNSPECIFIED TYPE: ICD-10-CM

## 2019-03-17 LAB
ANION GAP SERPL CALCULATED.3IONS-SCNC: 5 MMOL/L (ref 3–14)
BASOPHILS # BLD AUTO: 0 10E9/L (ref 0–0.2)
BASOPHILS NFR BLD AUTO: 0.4 %
BUN SERPL-MCNC: 15 MG/DL (ref 7–30)
CALCIUM SERPL-MCNC: 8.1 MG/DL (ref 8.5–10.1)
CHLORIDE SERPL-SCNC: 113 MMOL/L (ref 94–109)
CO2 SERPL-SCNC: 26 MMOL/L (ref 20–32)
CREAT SERPL-MCNC: 1.03 MG/DL (ref 0.66–1.25)
D DIMER PPP FEU-MCNC: 0.3 UG/ML FEU (ref 0–0.5)
DIFFERENTIAL METHOD BLD: NORMAL
EOSINOPHIL # BLD AUTO: 0.1 10E9/L (ref 0–0.7)
EOSINOPHIL NFR BLD AUTO: 1.8 %
ERYTHROCYTE [DISTWIDTH] IN BLOOD BY AUTOMATED COUNT: 12.5 % (ref 10–15)
GFR SERPL CREATININE-BSD FRML MDRD: 89 ML/MIN/{1.73_M2}
GLUCOSE SERPL-MCNC: 94 MG/DL (ref 70–99)
HCT VFR BLD AUTO: 44.6 % (ref 40–53)
HGB BLD-MCNC: 15.2 G/DL (ref 13.3–17.7)
IMM GRANULOCYTES # BLD: 0 10E9/L (ref 0–0.4)
IMM GRANULOCYTES NFR BLD: 0.2 %
INTERPRETATION ECG - MUSE: NORMAL
LYMPHOCYTES # BLD AUTO: 1.9 10E9/L (ref 0.8–5.3)
LYMPHOCYTES NFR BLD AUTO: 33 %
MCH RBC QN AUTO: 30.5 PG (ref 26.5–33)
MCHC RBC AUTO-ENTMCNC: 34.1 G/DL (ref 31.5–36.5)
MCV RBC AUTO: 90 FL (ref 78–100)
MONOCYTES # BLD AUTO: 0.5 10E9/L (ref 0–1.3)
MONOCYTES NFR BLD AUTO: 8.7 %
NEUTROPHILS # BLD AUTO: 3.2 10E9/L (ref 1.6–8.3)
NEUTROPHILS NFR BLD AUTO: 55.9 %
PLATELET # BLD AUTO: 190 10E9/L (ref 150–450)
POTASSIUM SERPL-SCNC: 4.3 MMOL/L (ref 3.4–5.3)
RBC # BLD AUTO: 4.98 10E12/L (ref 4.4–5.9)
SODIUM SERPL-SCNC: 144 MMOL/L (ref 133–144)
TROPONIN I SERPL-MCNC: <0.015 UG/L (ref 0–0.04)
WBC # BLD AUTO: 5.6 10E9/L (ref 4–11)

## 2019-03-17 PROCEDURE — 84484 ASSAY OF TROPONIN QUANT: CPT | Performed by: EMERGENCY MEDICINE

## 2019-03-17 PROCEDURE — 93005 ELECTROCARDIOGRAM TRACING: CPT

## 2019-03-17 PROCEDURE — 99285 EMERGENCY DEPT VISIT HI MDM: CPT

## 2019-03-17 PROCEDURE — 71046 X-RAY EXAM CHEST 2 VIEWS: CPT

## 2019-03-17 PROCEDURE — 25000132 ZZH RX MED GY IP 250 OP 250 PS 637: Performed by: EMERGENCY MEDICINE

## 2019-03-17 PROCEDURE — 80048 BASIC METABOLIC PNL TOTAL CA: CPT | Performed by: EMERGENCY MEDICINE

## 2019-03-17 PROCEDURE — 85379 FIBRIN DEGRADATION QUANT: CPT | Performed by: EMERGENCY MEDICINE

## 2019-03-17 PROCEDURE — 85025 COMPLETE CBC W/AUTO DIFF WBC: CPT | Performed by: EMERGENCY MEDICINE

## 2019-03-17 RX ORDER — ASPIRIN 81 MG/1
324 TABLET, CHEWABLE ORAL ONCE
Status: COMPLETED | OUTPATIENT
Start: 2019-03-17 | End: 2019-03-17

## 2019-03-17 RX ADMIN — ASPIRIN 81 MG 324 MG: 81 TABLET ORAL at 11:38

## 2019-03-17 ASSESSMENT — ENCOUNTER SYMPTOMS
NAUSEA: 0
COUGH: 0
NERVOUS/ANXIOUS: 1
DIAPHORESIS: 0
FEVER: 0
NECK PAIN: 1
SHORTNESS OF BREATH: 1

## 2019-03-17 ASSESSMENT — MIFFLIN-ST. JEOR: SCORE: 2008.95

## 2019-03-17 NOTE — ED PROVIDER NOTES
History     Chief Complaint:  Chest Pain     HPI   José Augustine is a 43 year old male who presents to the emergency department today for evaluation of chest pain. Patient reports that he has been experiencing intermittent chest pain since Friday (2 days ago). This is present in his neck as well and described as a dull pain. He also endorses feeling quite anxious with some mild shortness of breath that he somewhat attributes to his asthma. He otherwise denies hemoptysis, fevers, leg pain or swelling, diaphoresis, or nausea. Of note, he is currently getting over a cold and still feeling congested. He has also noticed recent high blood pressure and weight gain, though no cardiac history.     Cardiac/PE/DVT Risk Factors:  History of hypertension - Negative  History of hyperlipidemia - Negative  History of diabetes - Negative  History of smoking - Negative   Personal history of PE/DVT - Negative   Family history of heart complications - Positive, not at young age   Recent travel - Negative  Recent surgery - Negative   Other immobilizations - Negative   Cancer - Positive, s/p thyroidectomy in 2013 with no further issues. Received radioactive iodine treatment. On levothyroxine.     Allergies:  No Known Drug Allergies      Medications:    Levothyroxine   Albuterol   Qvar     Past Medical History:    Asthma   Eczema   Papillary thyroid carcinoma   Postsurgical hypothyroidism   Thyroid nodule     Past Surgical History:    Shoulder labral repair   Thyroidectomy   Vasectomy     Family History:    Father: skin cancer, hyperlipidemia   Maternal grandfather: heart disease   Paternal grandfather: heart disease   No family history of thyroid cancer or thyroid disease.     Social History:  Smoking Status: Never Smoker  Smokeless Tobacco: Never Used  Alcohol Use: Positive, weekly   Drug Use: Negative    Marital Status:       Review of Systems   Constitutional: Negative for diaphoresis and fever.   HENT: Positive for  congestion.    Respiratory: Positive for shortness of breath. Negative for cough (no hemoptysis).    Cardiovascular: Positive for chest pain. Negative for leg swelling.   Gastrointestinal: Negative for nausea.   Musculoskeletal: Positive for neck pain.        No leg pain   Psychiatric/Behavioral: The patient is nervous/anxious.    All other systems reviewed and are negative.      Physical Exam     Patient Vitals for the past 24 hrs:   BP Temp Temp src Pulse Heart Rate Resp SpO2 Height Weight   03/17/19 1130 121/77 -- -- 62 62 15 95 % -- --   03/17/19 1115 121/72 -- -- 61 63 (!) 0 95 % -- --   03/17/19 1100 126/84 -- -- -- 62 18 99 % -- --   03/17/19 1013 156/86 96.8  F (36  C) Temporal 72 -- 16 100 % 1.829 m (6') 106.6 kg (235 lb)     Physical Exam  General: Well-nourished,  appears to be resting comfortably when I enter the room  Eyes: PERRL, conjunctivae pink no scleral icterus or conjunctival injection  ENT:  Moist mucus membranes, posterior oropharynx clear without erythema or exudates  Respiratory:  Lungs clear to auscultation bilaterally, no crackles/rubs/wheezes.  Good air movement  CV: Normal rate and rhythm, no murmurs/rubs/gallops  GI:  Abdomen soft and non-distended.  Normoactive BS.  No tenderness, guarding or rebound  Skin: Warm, dry.  No rashes or petechiae  Musculoskeletal: No peripheral edema or calf tenderness  Neuro: Alert and oriented to person/place/time  Psychiatric: Normal affect    Emergency Department Course     ECG:  ECG taken at 1015, ECG read at 1015  Normal sinus rhythm  Normal ECG  Rate 69 bpm. UT interval 150 ms. QRS duration 90 ms. QT/QTc 406/435 ms. P-R-T axes 78 63 39.    Imaging:  Radiology findings were communicated with the patient who voiced understanding of the findings.    Chest XR,  PA & LAT  Negative exam.  NACHO CASAS MD  Reading per radiology     Laboratory:  Laboratory findings were communicated with the patient who voiced understanding of the findings.    CBC: WBC 5.6,  HGB 15.2,   BMP: Chloride 113(H), Calcium 8.1(L) o/w WNL (Creatinine 1.03)  Troponin (Collected 1100): <0.015    D dimer quantitative: 0.3     Interventions:  1138  mg Oral     Emergency Department Course:    1015 EKG taken as noted above.     1044 I performed an exam of the patient as documented above.     1046 Nursing notes and vitals reviewed.    1100 IV was inserted and blood was drawn for laboratory testing, results above.    1146 The patient was sent for an XR while in the emergency department, results above.     1215 Rechecked and updated.      1230 I personally reviewed the lab and imaging results with the patient and answered all related questions prior to discharge.    Impression & Plan      Medical Decision Making:    HEART Score  Criteria   0-2 points for each of 5 items (maximum of 10 points):  Score 0- History slightly suspicious for coronary syndrome  Score 0- EKG Normal  Score 0- Age <45 years old  Score 0- No risk factors for atherosclerotic disease  Score 0- Within normal limits for troponin levels  Interpretation  Risk of adverse outcome  Heart Score: 0  Total Score 0-3- Adverse Outcome Risk 2.5% - Supports early discharge with appropriate follow-up    José Augustine is a 43 year old male presented with chest pain. Initial laboratory and imaging tests have come back normal.  Troponin is negative despite duration of symptoms. There is no clinical, laboratory, or radiographic evidence of pulmonary embolism, aortic dissection or cardiac ischemia.  Given the low HEART score, I feel the candidate is appropriate for discharge with outpatient stress testing.  he has agreed to follow-up with the stress test and primary doctor and return if any worsening.    Diagnosis:    ICD-10-CM    1. Chest pain, unspecified type R07.9 Exercise Stress Echocardiogram     Disposition:   The patient is discharged to home.    Discharge Medications:  No discharge medications.     Scribe Disclosure:  Mary SOTO  geovany Hamilton serving as a scribe at 10:44 AM on 3/17/2019 to document services personally performed by Christine Black MD based on my observations and the provider's statements to me.       EMERGENCY DEPARTMENT       Christine Black MD  03/17/19 [id number]

## 2019-03-17 NOTE — DISCHARGE INSTRUCTIONS
*You may resume diet and activities as tolerated.  *No new medications. Continue your current medications.  Recommend continue your daily aspirin until your stress test.  *Follow-up with your doctor for a recheck in 2-3 days.  Follow-up for your outpatient stress test in the next 3 days.  *Return if you develop difficulty in breathing, faint or feel like you will faint or become worse in any way.    Discharge Instructions  Chest Pain    You have been seen today for chest pain or discomfort.  At this time, your provider has found no signs that your chest pain is due to a serious or life-threatening condition, (or you have declined more testing and/or admission to the hospital). However, sometimes there is a serious problem that does not show up right away. Your evaluation today may not be complete and you may need further testing and evaluation.     Generally, every Emergency Department visit should have a follow-up clinic visit with either a primary or a specialty clinic/provider. Please follow-up as instructed by your emergency provider today.  Return to the Emergency Department if:  Your chest pain changes, gets worse, starts to happen more often, or comes with less activity.  You are newly short of breath.  You get very weak or tired.  You pass out or faint.  You have any new symptoms, like fever, cough, numb legs, or you cough up blood.  You have anything else that worries you.    Until you follow-up with your regular provider, please do the following:  Take one aspirin daily unless you have an allergy or are told not to by your provider.  If a stress test appointment has been made, go to the appointment.  If you have questions, contact your regular provider.  Follow-up with your regular provider/clinic as directed; this is very important.    If you were given a prescription for medicine here today, be sure to read all of the information (including the package insert) that comes with your prescription.  This  will include important information about the medicine, its side effects, and any warnings that you need to know about.  The pharmacist who fills the prescription can provide more information and answer questions you may have about the medicine.  If you have questions or concerns that the pharmacist cannot address, please call or return to the Emergency Department.       Remember that you can always come back to the Emergency Department if you are not able to see your regular provider in the amount of time listed above, if you get any new symptoms, or if there is anything that worries you.

## 2019-03-22 ENCOUNTER — HOSPITAL ENCOUNTER (OUTPATIENT)
Dept: CARDIOLOGY | Facility: CLINIC | Age: 44
Discharge: HOME | End: 2019-03-22
Attending: EMERGENCY MEDICINE | Admitting: EMERGENCY MEDICINE
Payer: COMMERCIAL

## 2019-03-22 DIAGNOSIS — R07.9 CHEST PAIN, UNSPECIFIED TYPE: ICD-10-CM

## 2019-03-22 PROCEDURE — 93350 STRESS TTE ONLY: CPT | Mod: TC

## 2019-03-22 PROCEDURE — 93350 STRESS TTE ONLY: CPT | Mod: 26 | Performed by: INTERNAL MEDICINE

## 2019-03-22 PROCEDURE — 93018 CV STRESS TEST I&R ONLY: CPT | Performed by: INTERNAL MEDICINE

## 2019-03-22 PROCEDURE — 93321 DOPPLER ECHO F-UP/LMTD STD: CPT | Mod: 26 | Performed by: INTERNAL MEDICINE

## 2019-03-22 PROCEDURE — 93325 DOPPLER ECHO COLOR FLOW MAPG: CPT | Mod: 26 | Performed by: INTERNAL MEDICINE

## 2019-03-22 PROCEDURE — 93016 CV STRESS TEST SUPVJ ONLY: CPT | Performed by: INTERNAL MEDICINE

## 2019-05-10 ENCOUNTER — OFFICE VISIT (OUTPATIENT)
Dept: FAMILY MEDICINE | Facility: CLINIC | Age: 44
End: 2019-05-10
Payer: COMMERCIAL

## 2019-05-10 VITALS
SYSTOLIC BLOOD PRESSURE: 117 MMHG | BODY MASS INDEX: 27.19 KG/M2 | HEIGHT: 78 IN | DIASTOLIC BLOOD PRESSURE: 73 MMHG | OXYGEN SATURATION: 99 % | HEART RATE: 73 BPM | WEIGHT: 235 LBS | TEMPERATURE: 97.1 F

## 2019-05-10 DIAGNOSIS — E89.0 POSTSURGICAL HYPOTHYROIDISM: Chronic | ICD-10-CM

## 2019-05-10 DIAGNOSIS — R07.89 ATYPICAL CHEST PAIN: Primary | ICD-10-CM

## 2019-05-10 DIAGNOSIS — C73 PAPILLARY THYROID CARCINOMA (H): ICD-10-CM

## 2019-05-10 LAB
T4 FREE SERPL-MCNC: 1.12 NG/DL (ref 0.76–1.46)
TSH SERPL DL<=0.005 MIU/L-ACNC: 2.25 MU/L (ref 0.4–4)

## 2019-05-10 PROCEDURE — 84443 ASSAY THYROID STIM HORMONE: CPT | Performed by: INTERNAL MEDICINE

## 2019-05-10 PROCEDURE — 36415 COLL VENOUS BLD VENIPUNCTURE: CPT | Performed by: INTERNAL MEDICINE

## 2019-05-10 PROCEDURE — 84439 ASSAY OF FREE THYROXINE: CPT | Performed by: INTERNAL MEDICINE

## 2019-05-10 PROCEDURE — 99213 OFFICE O/P EST LOW 20 MIN: CPT | Performed by: NURSE PRACTITIONER

## 2019-05-10 RX ORDER — OMEPRAZOLE 20 MG/1
20 TABLET, DELAYED RELEASE ORAL DAILY
Qty: 60 TABLET | Refills: 0 | Status: SHIPPED | OUTPATIENT
Start: 2019-05-10 | End: 2019-09-13

## 2019-05-10 ASSESSMENT — MIFFLIN-ST. JEOR: SCORE: 2135.95

## 2019-05-10 NOTE — PROGRESS NOTES
SUBJECTIVE:   José Augustine is a 43 year old male who presents to clinic today for the following   health issues:        ED/UC Followup:    Facility:   ED  Date of visit: 03/17/2019  Reason for visit:        José Augustine is a 43 year old male presented with chest pain. Initial laboratory and imaging tests have come back normal.  Troponin is negative despite duration of symptoms. There is no clinical, laboratory, or radiographic evidence of pulmonary embolism, aortic dissection or cardiac ischemia.  Given the low HEART score, I feel the candidate is appropriate for discharge with outpatient stress testing.  he has agreed to follow-up with the stress test and primary doctor and return if any worsening.       Interpretation Summary of stress echocardiogram 3/22/19  The visual ejection fraction is estimated at 50-55%.  The patient exercised 12:00.  There was no chest pain or significant ST changes with exercise.  Target Heart Rate was achieved.  Normal resting wall motion and no stress-induced wall motion abnormality.  _____________________________________________________________________________      Chest pain, unspecified type    Current Status: patient is doing a lot better. Patient still having a little chest pain but not as intense. Has no nausea or vomiting, no cough or sore throat no difficulty swallowing, no choking on food, no weight loss       Has gained some weight recently due to low back pain that is mild and intermittent unless he works it too hard.  Is working with a / PT to try to get back in shape  Previous thyroidectomy and sees Dr Carolina endocrine for thyroid management  Non fasting lipid slightly high last time  Otherwise has no health concerns   Would like to establish at this clinic for future health care      Reviewed and updated as needed this visit by Provider         Patient Active Problem List   Diagnosis     Muscle calcification     Postsurgical hypothyroidism     Papillary  "thyroid carcinoma     Past Surgical History:   Procedure Laterality Date     ORTHOPEDIC SURGERY      shoulder labral repair     THYROIDECTOMY  12/9/2013    Procedure: THYROIDECTOMY;  Total Thyroidectomy, Central Neck Dissection ;  Surgeon: Pillo Henley MD;  Location:  OR       Social History     Tobacco Use     Smoking status: Never Smoker     Smokeless tobacco: Never Used   Substance Use Topics     Alcohol use: Yes     Comment: weekly     Family History   Problem Relation Age of Onset     Cancer Father         skin     Diabetes Other      Thyroid Cancer No family hx of      Thyroid Disease No family hx of          Current Outpatient Medications   Medication Sig Dispense Refill     albuterol (PROAIR HFA, PROVENTIL HFA, VENTOLIN HFA) 108 (90 BASE) MCG/ACT inhaler Inhale 2 puffs into the lungs every 6 hours as needed for shortness of breath / dyspnea or wheezing       beclomethasone (QVAR) 40 MCG/ACT Inhaler Inhale 1 puff into the lungs daily       levothyroxine (SYNTHROID/LEVOTHROID) 125 MCG tablet TAKE 2 TABLETS EVERY DAY MONDAY TO SATURDAY THEN TAKE 1 TABLET ON SUNDAY 180 tablet 2     omeprazole (PRILOSEC OTC) 20 MG EC tablet Take 1 tablet (20 mg) by mouth daily 60 tablet 0     Probiotic Product (PROBIOTIC ADVANCED PO)        No Known Allergies    ROS:  Constitutional, HEENT, cardiovascular, pulmonary, gi and gu systems are negative, except as otherwise noted.    OBJECTIVE:     /73 (BP Location: Left arm, Patient Position: Sitting, Cuff Size: Adult Regular)   Pulse 73   Temp 97.1  F (36.2  C) (Tympanic)   Ht 2.032 m (6' 8\")   Wt 106.6 kg (235 lb)   SpO2 99%   BMI 25.82 kg/m    Body mass index is 25.82 kg/m .  GENERAL: healthy, alert and no distress  RESP: lungs clear to auscultation - no rales, rhonchi or wheezes  CV: regular rate and rhythm, normal S1 S2, no S3 or S4, no murmur, click or rub, no peripheral edema   MS: no gross musculoskeletal defects noted, no edema  BACK: , no paralumbar " tenderness    Diagnostic Test Results:  none     ASSESSMENT/PLAN:       ICD-10-CM    1. Atypical chest pain R07.89 omeprazole (PRILOSEC OTC) 20 MG EC tablet   his chest discomfort may be related to some reflux will treat empirically and he will follow up and establish with a PCP in near future     KAILASH Stanford [facility]

## 2019-06-05 DIAGNOSIS — C73 PAPILLARY THYROID CARCINOMA (H): ICD-10-CM

## 2019-06-05 DIAGNOSIS — E89.0 POSTSURGICAL HYPOTHYROIDISM: Chronic | ICD-10-CM

## 2019-06-05 RX ORDER — LEVOTHYROXINE SODIUM 125 UG/1
TABLET ORAL
Qty: 180 TABLET | Refills: 3 | Status: CANCELLED | OUTPATIENT
Start: 2019-06-05

## 2019-09-13 ENCOUNTER — OFFICE VISIT (OUTPATIENT)
Dept: FAMILY MEDICINE | Facility: CLINIC | Age: 44
End: 2019-09-13
Payer: COMMERCIAL

## 2019-09-13 VITALS
SYSTOLIC BLOOD PRESSURE: 120 MMHG | HEART RATE: 58 BPM | HEIGHT: 78 IN | WEIGHT: 239 LBS | DIASTOLIC BLOOD PRESSURE: 73 MMHG | TEMPERATURE: 97.2 F | BODY MASS INDEX: 27.65 KG/M2 | OXYGEN SATURATION: 98 %

## 2019-09-13 DIAGNOSIS — Z00.00 ROUTINE HISTORY AND PHYSICAL EXAMINATION OF ADULT: Primary | ICD-10-CM

## 2019-09-13 DIAGNOSIS — M54.50 CHRONIC RIGHT-SIDED LOW BACK PAIN WITHOUT SCIATICA: ICD-10-CM

## 2019-09-13 DIAGNOSIS — G89.29 CHRONIC RIGHT-SIDED LOW BACK PAIN WITHOUT SCIATICA: ICD-10-CM

## 2019-09-13 DIAGNOSIS — E83.51 LOW CALCIUM LEVELS: ICD-10-CM

## 2019-09-13 DIAGNOSIS — E89.0 POSTSURGICAL HYPOTHYROIDISM: Chronic | ICD-10-CM

## 2019-09-13 DIAGNOSIS — Z13.220 LIPID SCREENING: ICD-10-CM

## 2019-09-13 DIAGNOSIS — C73 PAPILLARY THYROID CARCINOMA (H): ICD-10-CM

## 2019-09-13 DIAGNOSIS — J45.30 MILD PERSISTENT ASTHMA WITHOUT COMPLICATION: ICD-10-CM

## 2019-09-13 DIAGNOSIS — E34.4 TOO TALL STATURE (H): ICD-10-CM

## 2019-09-13 DIAGNOSIS — Z23 FLU VACCINE NEED: ICD-10-CM

## 2019-09-13 DIAGNOSIS — Z23 NEED FOR 23-POLYVALENT PNEUMOCOCCAL POLYSACCHARIDE VACCINE: ICD-10-CM

## 2019-09-13 LAB
ALBUMIN SERPL-MCNC: 4 G/DL (ref 3.4–5)
ALP SERPL-CCNC: 53 U/L (ref 40–150)
ALT SERPL W P-5'-P-CCNC: 43 U/L (ref 0–70)
ANION GAP SERPL CALCULATED.3IONS-SCNC: 3 MMOL/L (ref 3–14)
AST SERPL W P-5'-P-CCNC: 17 U/L (ref 0–45)
BILIRUB SERPL-MCNC: 0.5 MG/DL (ref 0.2–1.3)
BUN SERPL-MCNC: 12 MG/DL (ref 7–30)
CALCIUM SERPL-MCNC: 8.8 MG/DL (ref 8.5–10.1)
CHLORIDE SERPL-SCNC: 110 MMOL/L (ref 94–109)
CHOLEST SERPL-MCNC: 205 MG/DL
CO2 SERPL-SCNC: 29 MMOL/L (ref 20–32)
CREAT SERPL-MCNC: 1.01 MG/DL (ref 0.66–1.25)
GFR SERPL CREATININE-BSD FRML MDRD: >90 ML/MIN/{1.73_M2}
GLUCOSE SERPL-MCNC: 89 MG/DL (ref 70–99)
HDLC SERPL-MCNC: 51 MG/DL
LDLC SERPL CALC-MCNC: 138 MG/DL
NONHDLC SERPL-MCNC: 154 MG/DL
POTASSIUM SERPL-SCNC: 4.4 MMOL/L (ref 3.4–5.3)
PROT SERPL-MCNC: 7.4 G/DL (ref 6.8–8.8)
SODIUM SERPL-SCNC: 142 MMOL/L (ref 133–144)
TRIGL SERPL-MCNC: 79 MG/DL

## 2019-09-13 PROCEDURE — 99213 OFFICE O/P EST LOW 20 MIN: CPT | Mod: 25 | Performed by: INTERNAL MEDICINE

## 2019-09-13 PROCEDURE — 90471 IMMUNIZATION ADMIN: CPT | Performed by: INTERNAL MEDICINE

## 2019-09-13 PROCEDURE — 90682 RIV4 VACC RECOMBINANT DNA IM: CPT | Performed by: INTERNAL MEDICINE

## 2019-09-13 PROCEDURE — 90732 PPSV23 VACC 2 YRS+ SUBQ/IM: CPT | Performed by: INTERNAL MEDICINE

## 2019-09-13 PROCEDURE — 90472 IMMUNIZATION ADMIN EACH ADD: CPT | Performed by: INTERNAL MEDICINE

## 2019-09-13 PROCEDURE — 36415 COLL VENOUS BLD VENIPUNCTURE: CPT | Performed by: INTERNAL MEDICINE

## 2019-09-13 PROCEDURE — 80053 COMPREHEN METABOLIC PANEL: CPT | Performed by: INTERNAL MEDICINE

## 2019-09-13 PROCEDURE — 80061 LIPID PANEL: CPT | Performed by: INTERNAL MEDICINE

## 2019-09-13 PROCEDURE — 99396 PREV VISIT EST AGE 40-64: CPT | Mod: 25 | Performed by: INTERNAL MEDICINE

## 2019-09-13 RX ORDER — LEVOTHYROXINE SODIUM 125 UG/1
125 TABLET ORAL DAILY
Qty: 180 TABLET | Refills: 2 | Status: CANCELLED | COMMUNITY
Start: 2019-09-13

## 2019-09-13 RX ORDER — ALBUTEROL SULFATE 90 UG/1
2 AEROSOL, METERED RESPIRATORY (INHALATION) EVERY 6 HOURS PRN
Qty: 1 INHALER | Refills: 2 | Status: SHIPPED | OUTPATIENT
Start: 2019-09-13 | End: 2022-04-13

## 2019-09-13 ASSESSMENT — MIFFLIN-ST. JEOR: SCORE: 2149.1

## 2019-09-13 NOTE — NURSING NOTE
"Chief Complaint   Patient presents with     Physical     Establish Care     /73 (BP Location: Right arm, Patient Position: Chair, Cuff Size: Adult Regular)   Pulse 58   Temp 97.2  F (36.2  C) (Tympanic)   Ht 2.032 m (6' 8\")   Wt 108.4 kg (239 lb)   SpO2 98%   BMI 26.26 kg/m   Estimated body mass index is 26.26 kg/m  as calculated from the following:    Height as of this encounter: 2.032 m (6' 8\").    Weight as of this encounter: 108.4 kg (239 lb).  bp completed using cuff size: regular      Health Maintenance addressed:  NONE    N/a  Prior to immunization administration, verified patients identity using patient s name and date of birth. Please see Immunization Activity for additional information.     Screening Questionnaire for Adult Immunization    Are you sick today?   No   Do you have allergies to medications, food, a vaccine component or latex?   No   Have you ever had a serious reaction after receiving a vaccination?   No   Do you have a long-term health problem with heart disease, lung disease, asthma, kidney disease, metabolic disease (e.g. diabetes), anemia, or other blood disorder?   No   Do you have cancer, leukemia, HIV/AIDS, or any other immune system problem?   No   In the past 3 months, have you taken medications that affect  your immune system, such as prednisone, other steroids, or anticancer drugs; drugs for the treatment of rheumatoid arthritis, Crohn s disease, or psoriasis; or have you had radiation treatments?   No   Have you had a seizure, or a brain or other nervous system problem?   No   During the past year, have you received a transfusion of blood or blood     products, or been given immune (gamma) globulin or antiviral drug?   No   For women: Are you pregnant or is there a chance you could become        pregnant during the next month?   No   Have you received any vaccinations in the past 4 weeks?   No     Immunization questionnaire answers were all negative.        Per orders " of Dr. Mathur, injection of PCV23 given by Stacey Pickard CMA. Patient instructed to remain in clinic for 15 minutes afterwards, and to report any adverse reaction to me immediately.       Screening performed by Stacey Pickard CMA on 9/13/2019 at 8:47 AM.

## 2019-09-13 NOTE — PROGRESS NOTES
SUBJECTIVE:   CC: José Augustine is an 44 year old male who presents for preventative health visit and address other health/medical problems .     Healthy Habits:     Getting at least 3 servings of Calcium per day:  Yes    Bi-annual eye exam:  NO    Dental care twice a year:  Yes    Sleep apnea or symptoms of sleep apnea:  None    Diet:  Regular (no restrictions)    Frequency of exercise:  1 day/week    Duration of exercise:  45-60 minutes    Taking medications regularly:  Yes    Barriers to taking medications:  None    Medication side effects:  None    PHQ-2 Total Score: 0    Additional concerns today:  No    Patient presenting to establish care and for his physical, has history of asthma, he is using Qvar (1 puff once daily), record state that he has mild intermittent asthma but because he is using controller medicine; he has mild persistent asthma, he used albuterol 5 times over the last month, before that he has rarely used inhaler over the last year.  He does have history of papillary thyroid cancer ,carcinoma thyroid nodule, postsurgical hypothyroidism 12/9/2013, follows with endocrinology, history of eczema and allergies, he thinks he is allergic to pollens, has seasonal allergies.  He has gained some weight over the last 2 years around 17 pounds due to low back pain, denies any sciatica associated.  He was seen in the ER on 3/2019 for chest pain, was suggested he has GERD and was prescribed omeprazole which he has not used; he did a stress echo that was normal ; stress echo did show some rare PVCs during stress.  Also diagnosis of myositis ossificans;patient is not aware of that, irritable bowel syndrome, seems to be quiescent , H/O dermatitis, anxiety stress related, he had a history of dislocation of the AC shoulder joint and history of vitiligo of the left mandible.  No history of smoking but drinks alcohol socially.  History of thyroidectomy and shoulder repair.  He wears glasses for nearsightedness.  His  back pain now is quiescent for some time, no other muscular skeletal pains, no history of hyperlaxity of the joints, no history of Marfan in the family or of sudden cardiac death.  He has been exercising around 1-1/2 hour/week mainly walking and he has a  that works with him because he hurt his back on elliptical.  He does go to the gym once a week; his last lipid panel were HDL 50 triglyceride 192 and .  Of note in the last 2 lab work-up in the ED calcium was drawn was low at 8.1.  He denies any symptoms related    Today's PHQ-2 Score:   PHQ-2 ( 1999 Pfizer) 9/13/2019   Q1: Little interest or pleasure in doing things 0   Q2: Feeling down, depressed or hopeless 0   PHQ-2 Score 0       Abuse: Current or Past(Physical, Sexual or Emotional)- No  Do you feel safe in your environment? Yes    Social History     Tobacco Use     Smoking status: Never Smoker     Smokeless tobacco: Never Used   Substance Use Topics     Alcohol use: Yes     Comment: weekly         No flowsheet data found.    Last PSA: No results found for: PSA    Reviewed orders with patient. Reviewed health maintenance and updated orders accordingly - Yes  Lab work is in process  Labs reviewed in EPIC  BP Readings from Last 3 Encounters:   09/13/19 120/73   05/10/19 117/73   03/17/19 121/77    Wt Readings from Last 3 Encounters:   09/13/19 108.4 kg (239 lb)   05/10/19 106.6 kg (235 lb)   03/17/19 106.6 kg (235 lb)                  Patient Active Problem List   Diagnosis     Muscle calcification     Postsurgical hypothyroidism     Papillary thyroid carcinoma     Chronic right-sided low back pain without sciatica     Past Surgical History:   Procedure Laterality Date     ORTHOPEDIC SURGERY      shoulder labral repair     THYROIDECTOMY  12/9/2013    Procedure: THYROIDECTOMY;  Total Thyroidectomy, Central Neck Dissection ;  Surgeon: Pillo Henley MD;  Location:  OR       Social History     Tobacco Use     Smoking status: Never Smoker     Smokeless  tobacco: Never Used   Substance Use Topics     Alcohol use: Yes     Comment: weekly     Family History   Problem Relation Age of Onset     Cancer Father         skin     Diabetes Other      Thyroid Cancer No family hx of      Thyroid Disease No family hx of          Current Outpatient Medications   Medication Sig Dispense Refill     albuterol (PROAIR HFA/PROVENTIL HFA/VENTOLIN HFA) 108 (90 Base) MCG/ACT inhaler Inhale 2 puffs into the lungs every 6 hours as needed for shortness of breath / dyspnea or wheezing 1 Inhaler 2     beclomethasone HFA (QVAR REDIHALER) 80 MCG/ACT inhaler Inhale 1 puff into the lungs 2 times daily 1 Inhaler 2     levothyroxine (SYNTHROID/LEVOTHROID) 125 MCG tablet TAKE 2 TABLETS EVERY DAY MONDAY TO SATURDAY THEN TAKE 1 TABLET ON SUNDAY 180 tablet 2     Probiotic Product (PROBIOTIC ADVANCED PO)        No Known Allergies  Recent Labs   Lab Test 09/13/19  0848 05/10/19  1300 03/17/19  1100 02/04/19  1539   *  --   --   --    HDL 51  --   --   --    TRIG 79  --   --   --    ALT 43  --   --   --    CR 1.01  --  1.03  --    GFRESTIMATED >90  --  89  --    GFRESTBLACK >90  --  >90  --    POTASSIUM 4.4  --  4.3  --    TSH  --  2.25  --  1.40        Reviewed and updated as needed this visit by clinical staff  Tobacco  Allergies  Meds  Problems  Med Hx  Surg Hx  Fam Hx  Soc Hx          Reviewed and updated as needed this visit by Provider  Tobacco  Allergies  Meds  Problems  Med Hx  Surg Hx  Fam Hx  Soc Hx         Past Medical History:   Diagnosis Date     Allergic state      Asthma age 12     Eczema      Papillary thyroid carcinoma (H) 12/9/13    right 1.5 cm, ETE, LN+, BRAF+     Postsurgical hypothyroidism 12/9/13     Thyroid nodule       Past Surgical History:   Procedure Laterality Date     ORTHOPEDIC SURGERY      shoulder labral repair     THYROIDECTOMY  12/9/2013    Procedure: THYROIDECTOMY;  Total Thyroidectomy, Central Neck Dissection ;  Surgeon: Pillo Henley MD;  Location:  "[facility] OR       Review of Systems  CONSTITUTIONAL: NEGATIVE for fever, chills, change in weight  INTEGUMENTARY/SKIN: NEGATIVE for worrisome rashes, moles or lesions  EYES: NEGATIVE for vision changes or irritation  ENT: NEGATIVE for ear, mouth and throat problems  RESP: NEGATIVE for significant cough or SOB  RESP:POSITIVE for Hx mild persistent asthma  CV: NEGATIVE for chest pain, palpitations or peripheral edema  GI: NEGATIVE for nausea, abdominal pain, heartburn, or change in bowel habits  GI: POSITIVE for Hx GERD   male: negative for dysuria, hematuria, decreased urinary stream, erectile dysfunction, urethral discharge  MUSCULOSKELETAL: NEGATIVE for significant arthralgias or myalgia, POSITIVE for chronic low back pain.  NEURO: NEGATIVE for weakness, dizziness or paresthesias  ENDOCRINE: NEGATIVE for temperature intolerance, skin/hair changes  HEME/ALLERGY/IMMUNE: NEGATIVE for bleeding problems  PSYCHIATRIC: NEGATIVE for changes in mood or affect    OBJECTIVE:   /73 (BP Location: Right arm, Patient Position: Chair, Cuff Size: Adult Regular)   Pulse 58   Temp 97.2  F (36.2  C) (Tympanic)   Ht 2.032 m (6' 8\")   Wt 108.4 kg (239 lb)   SpO2 98%   BMI 26.26 kg/m      Physical Exam  GENERAL: healthy, alert and no distress slight frontal bossing.  EYES: Eyes grossly normal to inspection, PERRL and conjunctivae and sclerae normal wears glasses.  HENT: ear canals and TM's normal, nose and mouth without ulcers or lesions.  High arched palate, NECK: no adenopathy, no asymmetry, masses, or scars and thyroid normal to palpation  RESP: lungs clear to auscultation - no rales, rhonchi or wheezes  CV: regular rate and rhythm, normal S1 S2, no S3 or S4, no murmur, click or rub, no peripheral edema and peripheral pulses strong  ABDOMEN: soft, nontender, no hepatosplenomegaly, no masses and bowel sounds normal   (male):  declined by patient  MS: no gross musculoskeletal defects noted, no edema, no pectus deformity, No " wrist or thumb sign.  No hyperlaxity of joints or skin.  SKIN: no suspicious lesions or rashes  NEURO: Normal strength and tone, mentation intact and speech normal  PSYCH: mentation appears normal, affect normal/bright  LYMPH: no cervical, supraclavicular, axillary, or inguinal adenopathy    Diagnostic Test Results:  Labs reviewed in Epic    ASSESSMENT/PLAN:   José was seen today for physical and establish care.    Diagnoses and all orders for this visit:    Routine history and physical examination of adult    Postsurgical hypothyroidism  -     Comprehensive metabolic panel    Papillary thyroid carcinoma  -     Comprehensive metabolic panel    Papillary thyroid carcinoma (H)  -     Comprehensive metabolic panel    Mild persistent asthma without complication  -     albuterol (PROAIR HFA/PROVENTIL HFA/VENTOLIN HFA) 108 (90 Base) MCG/ACT inhaler; Inhale 2 puffs into the lungs every 6 hours as needed for shortness of breath / dyspnea or wheezing  -     beclomethasone HFA (QVAR REDIHALER) 80 MCG/ACT inhaler; Inhale 1 puff into the lungs 2 times daily  -     Pulmonary Function Test; Future    Low calcium levels  -     Comprehensive metabolic panel    Chronic right-sided low back pain without sciatica    Lipid screening  -     Lipid panel reflex to direct LDL Fasting    Need for 23-polyvalent pneumococcal polysaccharide vaccine  -     Pneumococcal vaccine 23 valent PPSV23  (Pneumovax) [10518]    Flu vaccine need  -     FLU VAC, QUADRIVALENT (RIV4) RECOMBINANT DNA, IM    Too tall stature (H)      Continue to follow with Endocrinology for follow on his Thyroid cancer ,   Hypocalcemia, asymptomatic, single lab test, concerning with his h/o thyroidectomy  No Marfan features on exam, has high arched palate, no other significant dysmorphic features or marfanoid features. No pectus deformity, has eye vision problems wears corrective eyeglasses, back pain, no known scoliosis, no wrist or thumb sign, no hyperlaxity of joints, no  "history of cardiac symptoms and no murmur on auscultations.      COUNSELING:   Reviewed preventive health counseling, as reflected in patient instructions       Regular exercise       Healthy diet/nutrition       Vision screening       Hearing screening       Immunizations    Vaccinated for: Influenza and Pneumococcal             Safe sex practices/STD prevention       HIV screeninx in teen years, 1x in adult years, and at intervals if high risk    Estimated body mass index is 26.26 kg/m  as calculated from the following:    Height as of this encounter: 2.032 m (6' 8\").    Weight as of this encounter: 108.4 kg (239 lb).     Weight management plan: Discussed healthy diet and exercise guidelines     reports that he has never smoked. He has never used smokeless tobacco.      Counseling Resources:  ATP IV Guidelines  Pooled Cohorts Equation Calculator  FRAX Risk Assessment  ICSI Preventive Guidelines  Dietary Guidelines for Americans, 2010  USDA's MyPlate  ASA Prophylaxis  Lung CA Screening    Camilla Mathur MD  [facility]  "

## 2019-09-14 ASSESSMENT — ASTHMA QUESTIONNAIRES: ACT_TOTALSCORE: 22

## 2019-09-29 ENCOUNTER — HEALTH MAINTENANCE LETTER (OUTPATIENT)
Age: 44
End: 2019-09-29

## 2019-12-04 DIAGNOSIS — C73 PAPILLARY THYROID CARCINOMA (H): ICD-10-CM

## 2019-12-04 DIAGNOSIS — E89.0 POSTSURGICAL HYPOTHYROIDISM: Chronic | ICD-10-CM

## 2019-12-04 NOTE — TELEPHONE ENCOUNTER
levothyroxine (SYNTHROID/LEVOTHROID) 125 MCG tablet      Last Written Prescription Date:  2/24/19  Last Fill Quantity: 180,   # refills: 2  Last Office Visit : 2/4/19  Future Office visit: 2/4/20    Routing refill request to provider for review/approval because:  Provider's preference.

## 2019-12-05 RX ORDER — LEVOTHYROXINE SODIUM 125 UG/1
TABLET ORAL
Qty: 56 TABLET | Refills: 9 | Status: SHIPPED | OUTPATIENT
Start: 2019-12-05 | End: 2020-02-04

## 2020-02-04 ENCOUNTER — OFFICE VISIT (OUTPATIENT)
Dept: ENDOCRINOLOGY | Facility: CLINIC | Age: 45
End: 2020-02-04
Payer: COMMERCIAL

## 2020-02-04 VITALS
WEIGHT: 237.4 LBS | HEART RATE: 65 BPM | HEIGHT: 78 IN | DIASTOLIC BLOOD PRESSURE: 76 MMHG | BODY MASS INDEX: 27.47 KG/M2 | SYSTOLIC BLOOD PRESSURE: 143 MMHG

## 2020-02-04 DIAGNOSIS — C73 PAPILLARY THYROID CARCINOMA (H): ICD-10-CM

## 2020-02-04 DIAGNOSIS — E89.0 POSTSURGICAL HYPOTHYROIDISM: Primary | Chronic | ICD-10-CM

## 2020-02-04 DIAGNOSIS — E89.0 POSTSURGICAL HYPOTHYROIDISM: Chronic | ICD-10-CM

## 2020-02-04 LAB
T4 FREE SERPL-MCNC: 1.14 NG/DL (ref 0.76–1.46)
TSH SERPL DL<=0.005 MIU/L-ACNC: 0.75 MU/L (ref 0.4–4)

## 2020-02-04 RX ORDER — LEVOTHYROXINE SODIUM 125 UG/1
TABLET ORAL
Qty: 160 TABLET | Refills: 3 | Status: SHIPPED | OUTPATIENT
Start: 2020-02-04 | End: 2020-12-26

## 2020-02-04 ASSESSMENT — MIFFLIN-ST. JEOR: SCORE: 2141.84

## 2020-02-04 ASSESSMENT — PAIN SCALES - GENERAL: PAINLEVEL: NO PAIN (0)

## 2020-02-04 NOTE — LETTER
2/4/2020       RE: José Augustine  [address] [facility] [address]     Dear Colleague,    Thank you for referring your patient, José Augustine, to the [facility] ENDOCRINOLOGY at [facility]. Please see a copy of my visit note below.    Endocrinology Consult Note    Attending ASSESSMENT/PLAN:     1. papillary thyroid carcinoma, 1.5 cm with extrathyroid extension, node positive, BRAF mutant positive.  He has been treated with surgery, 100 mCi 131I.    pT3, pN1a, pMx, cM0  JESUS recurrence risk intermediate  Labs today  Neck US  Change Rx to 90 days after review of labs    2.  Post surgical hypothyroidism.    Treat to TSH < 0.4. Unstable/ compliance difficulties on current dose.  He is currently using 13.5 of the 125 mcg tablets/week       Nettie Carolina MD      Cc/ HISTORY OF PRESENT ILLNESS José presents for follow up ofthyroid cancer, post surgical hypothyroidism.He had + BRAF mutation on the FNAB specimen. He was last seen by me 12/19.  Since then we increased the LT4 from 250 x 6.5/week to MON to SAT 2 tablet/day (250 mcg/day); SUN 1.5 tablet (187.5 mcg) . Later he informed me that he had not taken my advice. In June he was on 250 * 6.5/week.  Today he says he is on 13.5 tablets/week, using the 125 mcg tablets (241 mcg/day average)0000.  He has a pill tray and he takes the missed doses the next day if hemisses.  He feels well, has no concerns today.     The  thyroid cancer treatment course has been as follows:   12/9/13 Total thyroidectomy, central neck dissection removing right PCT 1.5 cm, extending to extrathyroidal fatty tissue (foca ETE).  6/10 level 6 LN were positive for PCT.   MACIS:Total 4.55 if invasion and no distant  pT3, pN1a, pMx  8/13/14 131I 100 mCi after 2 dose Thyrogen stimulation.    11/12/15 thyrogen stimulated 3 mCi 123I TBS  negative. Day 5 thyrogen stimulated Tg was  048 with TSH 16.4.       We have the following relevant lab data:  6/30/11 TSh  0.99  10/3/12: vitamin D 16.4  9/4/13: TSh 1.07, calcium 9.1, BUN17, creatinin0.85, alk phos 54, vitamin D 41.7  1/21/14: Tg 0.48, JESUS < 0.4, TSH 9.77  5/20/14: Tg 0.31, JSEUS < 0.4, TSH1.02  8/13/14: Tg 0.89, JESUS < 0.4,  -  8/18/14 post therapy scan:  neck uptake  10/14/14: Tg 0.14, JESUS < 0.4, TSH 1.19  12/19/14 TSH 0.3, free T4 1.39  4/14/15: Tg 0.17, JESUS < 0.4, TSH 0.07  11/13/15: Tg 0.48, JESUS < 0.4, TSH 16,40  4/18/16: Tg 0.14, JESUS < 0.4,  12/20/17: Tg 0.16, JESUS < 0.4, TSH 0.02, free T4 1.57-   2/4/19: Tg 0.14, JESUS < 0.4, TSH 1.4, free T4 1.02 -     Neck US 12/26/17-  Right level 3 # 1 1.4 x 0.5 x 2.6 cm Echogenic hilum  Left level 3   Left level 3     REVIEW OF SYSTEMS  Weight stable  Sleep OK  energygood  Negative cardiac, respiratory, GI  Back is always a little touchy    Past Medical History  Past Medical History:   Diagnosis Date     Allergic state      Asthma age 12     Eczema      Papillary thyroid carcinoma (H) 12/9/13    right 1.5 cm, ETE, LN+, BRAF+     Postsurgical hypothyroidism 12/9/13     Thyroid nodule        Past Surgical History:   Procedure Laterality Date     ORTHOPEDIC SURGERY      shoulder labral repair     THYROIDECTOMY  12/9/2013    Procedure: THYROIDECTOMY;  Total Thyroidectomy, Central Neck Dissection ;  Surgeon: Pillo Henley MD;  Location:  OR       Medications  Current Outpatient Medications   Medication Sig Dispense Refill     albuterol (PROAIR HFA/PROVENTIL HFA/VENTOLIN HFA) 108 (90 Base) MCG/ACT inhaler Inhale 2 puffs into the lungs every 6 hours as needed for shortness of breath / dyspnea or wheezing 1 Inhaler 2     beclomethasone HFA (QVAR REDIHALER) 80 MCG/ACT inhaler Inhale 1 puff into the lungs 2 times daily 1 Inhaler 2     levothyroxine (SYNTHROID/LEVOTHROID) 125 MCG tablet TAKE 2 TABLETS BY MOUTH EVERY DAY MONDAY TO SATURDAY THEN TAKE 1 TABLET ON SUNDAY 56 tablet 9     Probiotic Product (PROBIOTIC ADVANCED PO)        Supplements:   Vitamin D 4000 international  "unit(s)/day - on this dose x about one yr-2  MVI  Fish oil  Mg  Probiotic    Allergies  No Known Allergies      Family History  family history includes Cancer in his father; Diabetes in an other family member.    Social History  Social History     Tobacco Use     Smoking status: Never Smoker     Smokeless tobacco: Never Used   Substance Use Topics     Alcohol use: Yes     Comment: weekly     Drug use: No       Physical Exam  BP (!) 143/76   Pulse 65   Ht 2.032 m (6' 8\")   Wt 107.7 kg (237 lb 6.4 oz)   BMI 26.08 kg/m     Body mass index is 26.08 kg/m .  GENERAL : pleasant young man  In no apparent distress.    SKIN: Normal color, normal temperature,   NECK: no palpable masses  EYES: PER,  No scleral icterus,  NECK: no palpable masses ;he reports minimal tenderness right neck   RESP: Lungs clear to auscultation bilaterally  CARDIAC: slightly hyperdynamic.  Regular rate and rhythm, normal S1 S2, without murmurs, rubs or gallops    NEURO: awake, alert, responds appropriately to questions.   Moves all extremities;  no  tremor of outstretched hand   EXTREMITIES: No clubbing, cyanosis or edema.      DATA REVIEW     Ref. Range 2/4/2020 14:34   T4 Free Latest Ref Range: 0.76 - 1.46 ng/dL 1.14   TSH Latest Ref Range: 0.40 - 4.00 mU/L 0.75       END[facility] THYROID LABS[facility] Latest Ref Rng & Units 5/10/2019 2/4/2019   TSH 0.40 - 4.00 mU/L 2.25 1.40   T4 FREE 0.76 - 1.46 ng/dL 1.12 1.02     ENDO THYROID LABS-[facility] Latest Ref Rng & Units 12/20/2017   TSH 0.40 - 4.00 mU/L 0.02 (L)   T4 FREE 0.76 - 1.46 ng/dL 1.52 (H)       Again, thank you for allowing me to participate in the care of your patient.      Sincerely,    Nettie Carolina MD      "

## 2020-02-04 NOTE — PROGRESS NOTES
Endocrinology Consult Note    Attending ASSESSMENT/PLAN:     1. papillary thyroid carcinoma, 1.5 cm with extrathyroid extension, node positive, BRAF mutant positive.  He has been treated with surgery, 100 mCi 131I.    pT3, pN1a, pMx, cM0  JESUS recurrence risk intermediate  Labs today  Neck US  Change Rx to 90 days after review of labs    Addendum: review of neck US from 2/6/2020:   Right level 4 # 1 0.4 x 0.6 x 0.8 cm   Right level 6 # 1 0.3 x  0.3 x 0.6 cm     Addendum #2.  Tg might be slighlty higher - repeat in 6 months.     2.  Post surgical hypothyroidism.    Treat to TSH < 0.4. Unstable/ compliance difficulties on current dose.  He is currently using 13.5 of the 125 mcg tablets/week       Nettie Carolina MD      Cc/ HISTORY OF PRESENT ILLNESS Lars presents for follow up ofthyroid cancer, post surgical hypothyroidism.He had + BRAF mutation on the FNAB specimen. He was last seen by me 12/19.  Since then we increased the LT4 from 250 x 6.5/week to MON to SAT 2 tablet/day (250 mcg/day); SUN 1.5 tablet (187.5 mcg) . Later he informed me that he had not taken my advice. In June he was on 250 * 6.5/week.  Today he says he is on 13.5 tablets/week, using the 125 mcg tablets (241 mcg/day average)0000.  He has a pill tray and he takes the missed doses the next day if hemisses.  He feels well, has no concerns today.     The  thyroid cancer treatment course has been as follows:   12/9/13 Total thyroidectomy, central neck dissection removing right PCT 1.5 cm, extending to extrathyroidal fatty tissue (foca ETE).  6/10 level 6 LN were positive for PCT.   MACIS:Total 4.55 if invasion and no distant  pT3, pN1a, pMx  8/13/14 131I 100 mCi after 2 dose Thyrogen stimulation.    11/12/15 thyrogen stimulated 3 mCi 123I TBS  negative. Day 5 thyrogen stimulated Tg was  048 with TSH 16.4.       We have the following relevant lab data:  6/30/11 TSh 0.99  10/3/12: vitamin D 16.4  9/4/13: TSh 1.07, calcium 9.1, BUN17, creatinin0.85, alk  phos 54, vitamin D 41.7  1/21/14: Tg 0.48, JESUS < 0.4, TSH 9.77  5/20/14: Tg 0.31, JESUS < 0.4, TSH1.02  8/13/14: Tg 0.89, JESUS < 0.4,  -  8/18/14 post therapy scan:  neck uptake  10/14/14: Tg 0.14, JESUS < 0.4, TSH 1.19  12/19/14 TSH 0.3, free T4 1.39  4/14/15: Tg 0.17, JESUS < 0.4, TSH 0.07  11/13/15: Tg 0.48, JESUS < 0.4, TSH 16,40  4/18/16: Tg 0.14, JESUS < 0.4,  12/20/17: Tg 0.16, JESUS < 0.4, TSH 0.02, free T4 1.57-   2/4/19: Tg 0.14, JESUS < 0.4, TSH 1.4, free T4 1.02 -   2/4/2020 following appt TSH 0.75, free T4 1.14; Tg 0.28, JESUS < 0.4- the level is higher -    Neck US 12/26/17-  Right level 3 # 1 1.4 x 0.5 x 2.6 cm Echogenic hilum  Left level 3   Left level 3     REVIEW OF SYSTEMS  Weight stable  Sleep OK  energygood  Negative cardiac, respiratory, GI  Back is always a little touchy    Past Medical History  Past Medical History:   Diagnosis Date     Allergic state      Asthma age 12     Eczema      Papillary thyroid carcinoma (H) 12/9/13    right 1.5 cm, ETE, LN+, BRAF+     Postsurgical hypothyroidism 12/9/13     Thyroid nodule        Past Surgical History:   Procedure Laterality Date     ORTHOPEDIC SURGERY      shoulder labral repair     THYROIDECTOMY  12/9/2013    Procedure: THYROIDECTOMY;  Total Thyroidectomy, Central Neck Dissection ;  Surgeon: Pillo Henley MD;  Location:  OR       Medications  Current Outpatient Medications   Medication Sig Dispense Refill     albuterol (PROAIR HFA/PROVENTIL HFA/VENTOLIN HFA) 108 (90 Base) MCG/ACT inhaler Inhale 2 puffs into the lungs every 6 hours as needed for shortness of breath / dyspnea or wheezing 1 Inhaler 2     beclomethasone HFA (QVAR REDIHALER) 80 MCG/ACT inhaler Inhale 1 puff into the lungs 2 times daily 1 Inhaler 2     levothyroxine (SYNTHROID/LEVOTHROID) 125 MCG tablet TAKE 2 TABLETS BY MOUTH EVERY DAY MONDAY TO SATURDAY THEN TAKE 1 TABLET ON SUNDAY 56 tablet 9     Probiotic Product (PROBIOTIC ADVANCED PO)        Supplements:   Vitamin D 4000 international  "unit(s)/day - on this dose x about one yr-2  MVI  Fish oil  Mg  Probiotic    Allergies  No Known Allergies      Family History  family history includes Cancer in his father; Diabetes in an other family member.    Social History  Social History     Tobacco Use     Smoking status: Never Smoker     Smokeless tobacco: Never Used   Substance Use Topics     Alcohol use: Yes     Comment: weekly     Drug use: No       Physical Exam  BP (!) 143/76   Pulse 65   Ht 2.032 m (6' 8\")   Wt 107.7 kg (237 lb 6.4 oz)   BMI 26.08 kg/m    Body mass index is 26.08 kg/m .  GENERAL : pleasant young man  In no apparent distress.    SKIN: Normal color, normal temperature,   NECK: no palpable masses  EYES: PER,  No scleral icterus,  NECK: no palpable masses ;he reports minimal tenderness right neck   RESP: Lungs clear to auscultation bilaterally  CARDIAC: slightly hyperdynamic.  Regular rate and rhythm, normal S1 S2, without murmurs, rubs or gallops    NEURO: awake, alert, responds appropriately to questions.   Moves all extremities;  no  tremor of outstretched hand   EXTREMITIES: No clubbing, cyanosis or edema.      DATA REVIEW     Ref. Range 2/4/2020 14:34   T4 Free Latest Ref Range: 0.76 - 1.46 ng/dL 1.14   TSH Latest Ref Range: 0.40 - 4.00 mU/L 0.75       [facility] THYROID LABS[facility] Latest Ref Rng & Units 5/10/2019 2/4/2019   TSH 0.40 - 4.00 mU/L 2.25 1.40   T4 FREE 0.76 - 1.46 ng/dL 1.12 1.02     ENDO THYROID LABS[facility] Latest Ref Rng & Units 12/20/2017   TSH 0.40 - 4.00 mU/L 0.02 (L)   T4 FREE 0.76 - 1.46 ng/dL 1.52 (H)       EXAMINATION: US HEAD NECK SOFT TISSUE, 2/6/2020 1:10 PM      COMPARISON: 12/26/2017, 4/18/2016.     HISTORY: Postsurgical hypothyroidism.     TECHNIQUE: Sonographic imaging performed of the neck to evaluate for  lymph nodes using grey scale and limited Doppler technique.     FINDINGS:  Lymph nodes are measured bilaterally with measurements given in  transverse, AP, and craniocaudal dimensions as " follows:     Right:  Level 2:   #1: 1.1 x 0.5 x 1.4 cm, previously 0.9 x 0.5 x 1.1 cm. Normal fatty  hilum.  #2: 1.0 x 0.5 x 1.7 cm, previously 1.1 x 0.6 x 1.6 cm. Normal fatty  hilum.  Level 4:   #1: 0.4 x 0.6 x 0.8 cm, not seen previously. Normal fatty hilum.  Level 6:   #1: 0.3 x 0.3 x 0.6 cm, not measured previously but grossly stable.  Normal fatty hilum.     Left:  Level 2:   #1: 1.0 x 0.6 x 1.4 cm, previously 1.1 x 0.8 x 1.5 cm. Normal fatty  hilum.  #2: 1.2 x 0.5 x 2.2 cm, previously 1.3 x 0.5 x 2.0 cm. Normal fatty  hilum.                                                                         IMPRESSION:  Soft tissue neck ultrasound with lymph node measurements as described  above.     I have personally reviewed the examination and initial interpretation  and I agree with the findings.     KAMRAN GREER MD

## 2020-02-04 NOTE — PATIENT INSTRUCTIONS
See me approximately yearly    Labs today and yearly (standing orders)  Neck ultrasound    Using a pill tray can help you keep track of your medication.  Specifically, if you get a pill tray that has all days of the week (Sunday-Saturday) and also 4 boxes for each day (often labeled as breafkast, lunch, dinner and bedtime) you can use the box to fill your once/day  pills for a whole month.  If you miss a levothyroxine dose you can take 2 the next day.    Information for patients on Levo-thyroxine      The thyroid hormone, Levo-thyroxine (L-T4) is one of the main hormones normally produced by the thyroid.  The drug is identical in chemical structure to the natural product.  Levothyroxine is used to treat hypothyroidism (underactive thyroid).  Sometimes it is used even when the thyroid is not underactive, to treat a goiter (enlarged thyroid) or a thyroid nodule.  For patients with a history of thyroid removal, L-T4 is used to replace the deficiency created by the surgery.    The purpose of giving L-T4 to treat hypothyroidism is to make up for the thyroid hormone previously produced by your thyroid before it failed.  Depending on the extent of your thyroid failure, you may require a higher or a lower dose of L-T4.  The goal is to make your blood level of TSH (a hormone made by the master gland, the pituitary, the gland which controls and judges the thyroid) normal.    This drug is usually well-tolerated and safe but it is important to take the proper dose for YOU.  This involves periodic measurements of TSH, usually within 1-2 months of a dose change.  You should be off biotin containing supplements for at least one week prior to thyroid blood tests.    You should alert your doctor if you have signs you are getting too much L-T4.  These include excessive nervousness, heart fluttering or skipping beats, diarrhea, or feeling too hot and/or sweaty.      There are many brand names for Levo-thyroxine (L-T4), including  Synthroid, Levoxyl, Levothroid, Unithroid, Tirosint and others.  Changing from one brand name thyroid hormone product to another or to a generic product (all generics are called levothyroxine) can cause changes in your thyroid hormone balance, even if the dose stays the same.  Since generic products can come from many different companies (such as [employer], [employer], [employer] and others), there may be less consistency among the different generic preparations.  The decision to change brands or to change to a generic product must be made with your physician or other caregiver.  Follow-up testing should be arranged to monitor for any needed adjustments.  Monitoring may need to be more frequent if you always receive a generic thyroid hormone product.    For proper thyroid hormone balance the dose of thyroid hormone in your pills must be precise and consistent.    Be sure to take the medication as prescribed on an empty stomach, and at least 4 hours apart from calcium supplements, iron and soy products.  Store the medication away from severe heat and humidity.    You should be OFF any biotin containing supplements at least 7 days prior to thyroid lab draws.       Many insurance companies and other providers charge higher co-payments for brand name medications.  Since brand name L-T4 is not very expensive, be sure to ask if the actual cost of buying the medication is less than the co-payment.  In some instances the charge for a co-payment may be greater than buying the drug outright, especially if the prescription needs to be refilled every 30 days.

## 2020-02-06 ENCOUNTER — ANCILLARY PROCEDURE (OUTPATIENT)
Dept: ULTRASOUND IMAGING | Facility: CLINIC | Age: 45
End: 2020-02-06
Payer: COMMERCIAL

## 2020-02-06 DIAGNOSIS — E89.0 POSTSURGICAL HYPOTHYROIDISM: Chronic | ICD-10-CM

## 2020-02-06 DIAGNOSIS — C73 PAPILLARY THYROID CARCINOMA (H): ICD-10-CM

## 2020-02-11 LAB — LAB SCANNED RESULT: NORMAL

## 2020-07-15 ENCOUNTER — MYC MEDICAL ADVICE (OUTPATIENT)
Dept: FAMILY MEDICINE | Facility: CLINIC | Age: 45
End: 2020-07-15

## 2020-07-15 NOTE — TELEPHONE ENCOUNTER
David ACT questionnaire answers    Question #1    5     Question #2    4     Question #3    5     Question #4    3     Question #5    4     TOTAL  21     # of Emergency Room visits related to asthma in the last 12 months    0     # of hospitalizations related  to asthma in the last 12 months  0    Richard Matute CMA on 7/15/2020 at 3:06 PM

## 2020-07-16 ASSESSMENT — ASTHMA QUESTIONNAIRES: ACT_TOTALSCORE: 21

## 2020-09-25 NOTE — PATIENT INSTRUCTIONS
You should continue to see me about once/year or sooner if needed   oriented to person, place and time , normal sensation , short and long term memory intact

## 2020-11-10 ENCOUNTER — VIRTUAL VISIT (OUTPATIENT)
Dept: FAMILY MEDICINE | Facility: OTHER | Age: 45
End: 2020-11-10

## 2020-11-10 DIAGNOSIS — J45.30 MILD PERSISTENT ASTHMA WITHOUT COMPLICATION: ICD-10-CM

## 2020-11-10 NOTE — PROGRESS NOTES
"Date: 11/10/2020 10:49:00  Clinician: Lewis Bal  Clinician NPI: [id number]  Patient: José Augustine  Patient : 1975  Patient Address: [address] , [place] [address]  Patient Phone: [phone]  Visit Protocol: URI  Patient Summary:  José is a 45 year old ( : 1975 ) male who initiated a [facility] Visit for COVID-19 (Coronavirus) evaluation and screening. When asked the question \"Please sign me up to receive news, health information and promotions. \", José responded \"No\".    José states his symptoms started gradually 3-4 days ago.   His symptoms consist of malaise, a sore throat, a cough, and nasal congestion. He is experiencing mild difficulty breathing with activities but can speak normally in full sentences.   Symptom details     Nasal secretions: The color of his mucus is white.    Cough: José coughs a few times an hour and his cough is not more bothersome at night. Phlegm does not come into his throat when he coughs. He believes his cough is caused by post-nasal drip.     Sore throat: José reports having mild throat pain (1-3 on a 10 point pain scale), does not have exudate on his tonsils, and can swallow liquids. He is not sure if the lymph nodes in his neck are enlarged. A rash has not appeared on the skin since the sore throat started.      José denies having vomiting, rhinitis, facial pain or pressure, myalgias, chills, teeth pain, ageusia, diarrhea, ear pain, headache, wheezing, fever, nausea, and anosmia. He also denies taking antibiotic medication in the past month, having recent facial or sinus surgery in the past 60 days, and double sickening (worsening symptoms after initial improvement).   Precipitating events  Within the past week, José has not been exposed to someone with strep throat. He has not recently been exposed to someone with influenza. José has not been in close contact with any high risk individuals.   Pertinent COVID-19 (Coronavirus) information  José does not work " or volunteer as healthcare worker or a . In the past 14 days, José has not worked or volunteered at a healthcare facility or group living setting.   In the past 14 days, he also has not lived in a congregate living setting.   José has not had a close contact with a laboratory-confirmed COVID-19 patient within 14 days of symptom onset.    Since December 2019, José has not been tested for COVID-19 and has not had upper respiratory infection or influenza-like illness.   Pertinent medical history  José does not need a return to work/school note.   Weight: 225 lbs   José does not smoke or use smokeless tobacco.   Additional information as reported by the patient (free text): I normally have mild asthma. My asthma has gotten quite a bit worse than normal. I'm using my albuterol inhaler multiple times per day which is unusual for me.   Weight: 225 lbs  A synchronous phone visit was initiated by the provider for the following reason: asthma flare    MEDICATIONS: Qvar RediHaler inhalation, albuterol sulfate inhalation, triamcinolone-dimethicone topical, ALLERGIES: NKDA  Clinician Response:  Dear José,   Your symptoms show that you may have coronavirus (COVID-19). This illness can cause fever, cough and trouble breathing. Many people get a mild case and get better on their own. Some people can get very sick.  What should I do?  We would like to test you for this virus.   1. Please call [phone] to schedule your visit. Explain that you were referred by [facility] to have a COVID-19 test. Be ready to share your [facility] visit ID number.  * If you need to schedule in [facility] please call [phone] or for [employer] employees please call [phone].  * If you need to schedule in the [place] area please call [phone]. [employer] employees call [phone].  The following will serve as your written order for this COVID Test, ordered by me, for the indication of suspected COVID [Z20.828]: The test will be  "ordered in [facility], our electronic health record, after you are scheduled. It will show as ordered and authorized by Juvenal Antunez MD.  Order: COVID-19 (Coronavirus) PCR [facility]TOMATIC [facility].   2. When it's time for your COVID test:  Stay at least 6 feet away from others. (If someone will drive you to your test, stay in the backseat, as far away from the  as you can.)   Cover your mouth and nose with a mask, tissue or washcloth.  Go straight to the testing site. Don't make any stops on the way there or back.      3.Starting now: Stay home and away from others (self-isolate) until:   You've had no fever---and no medicine that reduces fever---for one full day (24 hours). And...   Your other symptoms have gotten better. For example, your cough or breathing has improved. And...   At least 10 days have passed since your symptoms started.       During this time, don't leave the house except for testing or medical care.   Stay in your own room, even for meals. Use your own bathroom if you can.   Stay away from others in your home. No hugging, kissing or shaking hands. No visitors.  Don't go to work, school or anywhere else.    Clean \"high touch\" surfaces often (doorknobs, counters, handles, etc.). Use a household cleaning spray or wipes. You'll find a full list of  on the EPA website: www.epa.gov/pesticide-registration/list-n-disinfectants-use-against-sars-cov-2.   Cover your mouth and nose with a mask, tissue or washcloth to avoid spreading germs.  Wash your hands and face often. Use soap and water.  Caregivers in these groups are at risk for severe illness due to COVID-19:  o People 65 years and older  o People who live in a nursing home or long-term care facility  o People with chronic disease (lung, heart, cancer, diabetes, kidney, liver, immunologic)  o People who have a weakened immune system, including those who:   Are in cancer treatment  Take medicine that weakens the immune system, such as " corticosteroids  Had a bone marrow or organ transplant  Have an immune deficiency  Have poorly controlled HIV or AIDS  Are obese (body mass index of 40 or higher)  Smoke regularly   o Caregivers should wear gloves while washing dishes, handling laundry and cleaning bedrooms and bathrooms.  o Use caution when washing and drying laundry: Don't shake dirty laundry, and use the warmest water setting that you can.  o For more tips, go to www.cdc.gov/coronavirus/2019-ncov/downloads/10Things.pdf.    4.Sign up for [employer]. We know it's scary to hear that you might have COVID-19. We want to track your symptoms to make sure you're okay over the next 2 weeks. Please look for an email from [employer]---this is a free, online program that we'll use to keep in touch. To sign up, follow the link in the email. Learn more at [email or web address]  How can I take care of myself?   Get lots of rest. Drink extra fluids (unless a doctor has told you not to).   Take Tylenol (acetaminophen) for fever or pain. If you have liver or kidney problems, ask your family doctor if it's okay to take Tylenol.   Adults can take either:    650 mg (two 325 mg pills) every 4 to 6 hours, or...   1,000 mg (two 500 mg pills) every 8 hours as needed.    Note: Don't take more than 3,000 mg in one day. Acetaminophen is found in many medicines (both prescribed and over-the-counter medicines). Read all labels to be sure you don't take too much.   For children, check the Tylenol bottle for the right dose. The dose is based on the child's age or weight.    If you have other health problems (like cancer, heart failure, an organ transplant or severe kidney disease): Call your specialty clinic if you don't feel better in the next 2 days.       Know when to call 911. Emergency warning signs include:    Trouble breathing or shortness of breath Pain or pressure in the chest that doesn't go away Feeling confused like you haven't felt before, or not  being able to wake up Bluish-colored lips or face.  Where can I get more information?    [email or web address] [place] -- About COVID-19: [email or web address]   CDC -- What to Do If You're Sick: www.cdc.gov/coronavirus/2019-ncov/about/steps-when-sick.html   [facility] -- Ending Home Isolation: www.cdc.gov/coronavirus/2019-ncov/hcp/disposition-in-home-patients.html   [facility] -- Caring for Someone: www.cdc.gov/coronavirus/2019-ncov/if-you-are-sick/care-for-someone.html   [facility] -- Interim Guidance for Hospital Discharge to Home: www.[facility].Critical access hospital.[place]./diseases/coronavirus/hcp/hospdischarge.pdf   [facility] clinical trials (COVID-19 research studies): clinicalaffairs.[facility]-clinical-trials    Below are the COVID-19 hotlines at the [place] Department of Health ([facility]). Interpreters are available.    For health questions: Call 418-645-9577 or 1-816.805.9419 (7 a.m. to 7 p.m.) For questions about schools and childcare: Call 628-904-3320 or 1-776.228.7626 (7 a.m. to 7 p.m.)    Diagnosis: Contact with and (suspected) exposure to other viral communicable diseases  Diagnosis ICD: Z20.828  Triage Notes: I reviewed the patient's history, verified their identity, and explained the [facility] Visit process.    asthma not sob  Synchronous Triage: phone, status: completed, duration: 190 seconds

## 2020-11-10 NOTE — TELEPHONE ENCOUNTER
QVAR REDIHALER  80 MCG    Summary: Inhale 1 puff into the lungs 2 times daily, Disp-1 Inhaler, R-2, E-Prescribe   Dose, Route, Frequency: 1 puff, Inhalation, 2 TIMES DAILY  Start: 9/13/2019  Ord/Sold: 9/13/2019

## 2020-11-11 NOTE — TELEPHONE ENCOUNTER
Due for office visit prior to next refill.    Prescription approved per [facility] Refill Protocol X 1. Recent [facility] visit for Covid surveillance.    Neeta Boss RN on 11/11/2020 at 3:27 PM

## 2020-12-04 ENCOUNTER — OFFICE VISIT (OUTPATIENT)
Dept: FAMILY MEDICINE | Facility: CLINIC | Age: 45
End: 2020-12-04
Payer: COMMERCIAL

## 2020-12-04 DIAGNOSIS — Z13.220 LIPID SCREENING: ICD-10-CM

## 2020-12-04 DIAGNOSIS — R53.83 FATIGUE, UNSPECIFIED TYPE: ICD-10-CM

## 2020-12-04 DIAGNOSIS — J45.30 MILD PERSISTENT ASTHMA, UNSPECIFIED WHETHER COMPLICATED: ICD-10-CM

## 2020-12-04 DIAGNOSIS — Z00.00 ROUTINE HISTORY AND PHYSICAL EXAMINATION OF ADULT: Primary | ICD-10-CM

## 2020-12-04 PROCEDURE — 99213 OFFICE O/P EST LOW 20 MIN: CPT | Mod: 25 | Performed by: INTERNAL MEDICINE

## 2020-12-04 PROCEDURE — 99396 PREV VISIT EST AGE 40-64: CPT | Performed by: INTERNAL MEDICINE

## 2020-12-04 ASSESSMENT — MIFFLIN-ST. JEOR: SCORE: 2098.74

## 2020-12-04 NOTE — LETTER
My Asthma Action Plan    Name: José Augustine   YOB: 1975  Date: 12/4/2020   My doctor: Camilla Mathur MD   My clinic: [facility]        My Rescue Medicine:   Albuterol inhaler (Proair/Ventolin/Proventil HFA)  2-4 puffs EVERY 4 HOURS as needed. Use a spacer if recommended by your provider.   My Asthma Severity:   Intermittent / Exercise Induced  Know your asthma triggers: pollens and exercise or sports             GREEN ZONE   Good Control    I feel good    No cough or wheeze    Can work, sleep and play without asthma symptoms       Take your asthma control medicine every day.     1. If exercise triggers your asthma, take your rescue medication    15 minutes before exercise or sports, and    During exercise if you have asthma symptoms  2. Spacer to use with inhaler: If you have a spacer, make sure to use it with your inhaler             YELLOW ZONE Getting Worse  I have ANY of these:    I do not feel good    Cough or wheeze    Chest feels tight    Wake up at night   1. Keep taking your Green Zone medications  2. Start taking your rescue medicine:    every 20 minutes for up to 1 hour. Then every 4 hours for 24-48 hours.  3. If you stay in the Yellow Zone for more than 12-24 hours, contact your doctor.  4. If you do not return to the Green Zone in 12-24 hours or you get worse, start taking your oral steroid medicine if prescribed by your provider.           RED ZONE Medical Alert - Get Help  I have ANY of these:    I feel awful    Medicine is not helping    Breathing getting harder    Trouble walking or talking    Nose opens wide to breathe       1. Take your rescue medicine NOW  2. If your provider has prescribed an oral steroid medicine, start taking it NOW  3. Call your doctor NOW  4. If you are still in the Red Zone after 20 minutes and you have not reached your doctor:    Take your rescue medicine again and    Call 911 or go to the emergency room right away    See your  regular doctor within 2 weeks of an Emergency Room or Urgent Care visit for follow-up treatment.          Annual Reminders:  Meet with Asthma Educator,  Flu Shot in the Fall, consider Pneumonia Vaccination for patients with asthma (aged 19 and older).    Pharmacy: [facility] [id number] [address]    Electronically signed by Camilla Mathur MD   Date: 12/04/20                    Asthma Triggers  How To Control Things That Make Your Asthma Worse    Triggers are things that make your asthma worse.  Look at the list below to help you find your triggers and   what you can do about them. You can help prevent asthma flare-ups by staying away from your triggers.      Trigger                                                          What you can do   Cigarette Smoke  Tobacco smoke can make asthma worse. Do not allow smoking in your home, car or around you.  Be sure no one smokes at a child s day care or school.  If you smoke, ask your health care provider for ways to help you quit.  Ask family members to quit too.  Ask your health care provider for a referral to Quit Plan to help you quit smoking, or call 0-500-967-PLAN.     Colds, Flu, Bronchitis  These are common triggers of asthma. Wash your hands often.  Don t touch your eyes, nose or mouth.  Get a flu shot every year.     Dust Mites  These are tiny bugs that live in cloth or carpet. They are too small to see. Wash sheets and blankets in hot water every week.   Encase pillows and mattress in dust mite proof covers.  Avoid having carpet if you can. If you have carpet, vacuum weekly.   Use a dust mask and HEPA vacuum.   Pollen and Outdoor Mold  Some people are allergic to trees, grass, or weed pollen, or molds. Try to keep your windows closed.  Limit time out doors when pollen count is high.   Ask you health care provider about taking medicine during allergy season.     Animal Dander  Some people are allergic to skin flakes, urine or saliva from pets with  fur or feathers. Keep pets with fur or feathers out of your home.    If you can t keep the pet outdoors, then keep the pet out of your bedroom.  Keep the bedroom door closed.  Keep pets off cloth furniture and away from stuffed toys.     Mice, Rats, and Cockroaches  Some people are allergic to the waste from these pests.   Cover food and garbage.  Clean up spills and food crumbs.  Store grease in the refrigerator.   Keep food out of the bedroom.   Indoor Mold  This can be a trigger if your home has high moisture. Fix leaking faucets, pipes, or other sources of water.   Clean moldy surfaces.  Dehumidify basement if it is damp and smelly.   Smoke, Strong Odors, and Sprays  These can reduce air quality. Stay away from strong odors and sprays, such as perfume, powder, hair spray, paints, smoke incense, paint, cleaning products, candles and new carpet.   Exercise or Sports  Some people with asthma have this trigger. Be active!  Ask your doctor about taking medicine before sports or exercise to prevent symptoms.    Warm up for 5-10 minutes before and after sports or exercise.     Other Triggers of Asthma  Cold air:  Cover your nose and mouth with a scarf.  Sometimes laughing or crying can be a trigger.  Some medicines and food can trigger asthma.

## 2020-12-04 NOTE — PROGRESS NOTES
SUBJECTIVE:   CC: José Augustine is an 45 year old male who presents for preventative health visit.       Patient has been advised of split billing requirements and indicates understanding: Yes  Healthy Habits:     Getting at least 3 servings of Calcium per day:  Yes    Bi-annual eye exam:  Yes    Dental care twice a year:  Yes    Sleep apnea or symptoms of sleep apnea:  None    Diet:  Gluten-free/reduced    Frequency of exercise:  2-3 days/week    Duration of exercise:  30-45 minutes    Taking medications regularly:  Yes    Medication side effects:  None    PHQ-2 Total Score: 0    Additional concerns today:  Yes  Patient presenting to establish care and discuss chronic medical problems, history of asthma flares in the fall season.  Is maintained on Qvar uses 1 puff once daily and increase to 2 puffs daily with exacerbation.  Mainly maintained on albuterol rescue inhaler.  Patient denies any acute or ongoing asthma symptoms.  Denies any chest pain chest tightness dyspnea at rest or with exertion.  His blood pressure seems well controlled 120/73 with a pulse of 63 and pulse ox 98% on room air.  Patient does complain of occasional fatigue.  Denies any snoring or sleep apnea.    Today's PHQ-2 Score:   PHQ-2 ( 1999 Pfizer) 12/4/2020   Q1: Little interest or pleasure in doing things 0   Q2: Feeling down, depressed or hopeless 0   PHQ-2 Score 0   Q1: Little interest or pleasure in doing things Not at all   Q2: Feeling down, depressed or hopeless Not at all   PHQ-2 Score 0       Abuse: Current or Past(Physical, Sexual or Emotional)- No  Do you feel safe in your environment? Yes        Social History     Tobacco Use     Smoking status: Never Smoker     Smokeless tobacco: Never Used   Substance Use Topics     Alcohol use: Yes     Comment: weekly     If you drink alcohol do you typically have >3 drinks per day or >7 drinks per week? No    Alcohol Use 12/4/2020   Prescreen: >3 drinks/day or >7 drinks/week? No   Prescreen: >3  drinks/day or >7 drinks/week? -       Last PSA: No results found for: PSA    Reviewed orders with patient. Reviewed health maintenance and updated orders accordingly - Yes  Lab work is in process  Labs reviewed in EPIC  BP Readings from Last 3 Encounters:   12/05/20 120/73   02/04/20 (!) 143/76   09/13/19 120/73    Wt Readings from Last 3 Encounters:   12/04/20 103.9 kg (229 lb)   02/04/20 107.7 kg (237 lb 6.4 oz)   09/13/19 108.4 kg (239 lb)                  Patient Active Problem List   Diagnosis     Muscle calcification     Postsurgical hypothyroidism     Papillary thyroid carcinoma     Chronic right-sided low back pain without sciatica     Past Surgical History:   Procedure Laterality Date     ORTHOPEDIC SURGERY      shoulder labral repair     THYROIDECTOMY  12/9/2013    Procedure: THYROIDECTOMY;  Total Thyroidectomy, Central Neck Dissection ;  Surgeon: Pillo Henley MD;  Location:  OR       Social History     Tobacco Use     Smoking status: Never Smoker     Smokeless tobacco: Never Used   Substance Use Topics     Alcohol use: Yes     Comment: weekly     Family History   Problem Relation Age of Onset     Cancer Father         skin     Diabetes Other      Thyroid Cancer No family hx of      Thyroid Disease No family hx of          Current Outpatient Medications   Medication Sig Dispense Refill     albuterol (PROAIR HFA/PROVENTIL HFA/VENTOLIN HFA) 108 (90 Base) MCG/ACT inhaler Inhale 2 puffs into the lungs every 6 hours as needed for shortness of breath / dyspnea or wheezing 1 Inhaler 2     beclomethasone HFA (QVAR REDIHALER) 80 MCG/ACT inhaler Inhale 1 puff into the lungs 2 times daily 1 Inhaler 0     fluticasone-salmeterol (ADVAIR) 250-50 MCG/DOSE inhaler Inhale 1 puff into the lungs every 12 hours 1 Inhaler 1     levothyroxine (SYNTHROID/LEVOTHROID) 125 MCG tablet MON to SAT 2 tablet/day; SUN 1.5 tablet 160 tablet 3     Probiotic Product (PROBIOTIC ADVANCED PO)        No Known Allergies  Recent Labs   Lab Test  02/04/20  1434 09/13/19  0848 05/10/19  1300 03/17/19  1100   LDL  --  138*  --   --    HDL  --  51  --   --    TRIG  --  79  --   --    ALT  --  43  --   --    CR  --  1.01  --  1.03   GFRESTIMATED  --  >90  --  89   GFRESTBLACK  --  >90  --  >90   POTASSIUM  --  4.4  --  4.3   TSH 0.75  --  2.25  --         Reviewed and updated as needed this visit by clinical staff  Tobacco  Allergies  Meds   Med Hx  Surg Hx  Fam Hx          Reviewed and updated as needed this visit by Provider  Tobacco  Allergies    Med Hx  Surg Hx  Fam Hx         Past Medical History:   Diagnosis Date     Allergic state      Asthma age 12     Eczema      Papillary thyroid carcinoma (H) 12/9/13    right 1.5 cm, ETE, LN+, BRAF+     Postsurgical hypothyroidism 12/9/13     Thyroid nodule       Past Surgical History:   Procedure Laterality Date     ORTHOPEDIC SURGERY      shoulder labral repair     THYROIDECTOMY  12/9/2013    Procedure: THYROIDECTOMY;  Total Thyroidectomy, Central Neck Dissection ;  Surgeon: Pillo Henley MD;  Location:  OR       Review of Systems  CONSTITUTIONAL: NEGATIVE for fever, chills, change in weight  INTEGUMENTARY/SKIN: NEGATIVE for worrisome rashes, moles or lesions  EYES: NEGATIVE for vision changes or irritation  ENT: NEGATIVE for ear, mouth and throat problems  RESP: NEGATIVE for significant cough or SOB  CV: NEGATIVE for chest pain, palpitations or peripheral edema  GI: NEGATIVE for nausea, abdominal pain, heartburn, or change in bowel habits   male: negative for dysuria, hematuria, decreased urinary stream, erectile dysfunction, urethral discharge  MUSCULOSKELETAL: NEGATIVE for significant arthralgias or myalgia  NEURO: NEGATIVE for weakness, dizziness or paresthesias  ENDOCRINE: NEGATIVE for temperature intolerance, skin/hair changes  HEME/ALLERGY/IMMUNE: NEGATIVE for bleeding problems  PSYCHIATRIC: NEGATIVE for changes in mood or affect    OBJECTIVE:   /73   Pulse 63   Temp 96.6  F (35.9  C)  "(Tympanic)   Ht 2.032 m (6' 8\")   Wt 103.9 kg (229 lb)   SpO2 98%   BMI 25.16 kg/m      Physical Exam  GENERAL: healthy, alert and no distress  EYES: Eyes grossly normal to inspection, PERRL and conjunctivae and sclerae normal  HENT: ear canals and TM's normal, nose and mouth without ulcers or lesions  NECK: no adenopathy, no asymmetry, masses, or scars and thyroid normal to palpation  RESP: lungs clear to auscultation - no rales, rhonchi or wheezes  CV: regular rate and rhythm, normal S1 S2, no S3 or S4, no murmur, click or rub, no peripheral edema and peripheral pulses strong  ABDOMEN: soft, nontender, no hepatosplenomegaly, no masses and bowel sounds normal   (male): normal male genitalia without lesions or urethral discharge, no hernia  MS: no gross musculoskeletal defects noted, no edema  SKIN: no suspicious lesions or rashes  NEURO: Normal strength and tone, mentation intact and speech normal  PSYCH: mentation appears normal, affect normal/bright  LYMPH: no cervical, supraclavicular, axillary, or inguinal adenopathy    Diagnostic Test Results:  Labs reviewed in Epic    ASSESSMENT/PLAN:   José was seen today for physical.    Diagnoses and all orders for this visit:    Routine history and physical examination of adult  -     Cancel: CBC with platelets  -     CBC with platelets; Future    Mild persistent asthma, unspecified whether complicated  -     fluticasone-salmeterol (ADVAIR) 250-50 MCG/DOSE inhaler; Inhale 1 puff into the lungs every 12 hours  -     Cancel: Comprehensive metabolic panel (BMP + Alb, Alk Phos, ALT, AST, Total. Bili, TP)  -     Cancel: CBC with platelets  -     ALLERGY/ASTHMA ADULT REFERRAL  -     CBC with platelets; Future  -     Comprehensive metabolic panel (BMP + Alb, Alk Phos, ALT, AST, Total. Bili, TP); Future    Lipid screening  -     Cancel: Lipid panel reflex to direct LDL Fasting  -     Lipid panel reflex to direct LDL Fasting; Future    Fatigue, unspecified type  -     " "Cancel: Vitamin D Deficiency  -     Vitamin D Deficiency; Future    Advised on importance of using controller inhaler with inhaled steroids and long-acting beta agonist as he has been using his albuterol multiple times per week.  Patient is in agreement with that.  Continue to use a rescue albuterol inhaler as needed.  We discussed asthma action plan and reviewed the asthma control test scores.  We will check a vitamin D level history of chronic fatigue.  Encouraged him to increase exercise activities lifestyle changes low-fat low-cholesterol diet.  Check a lipid panel.  CBC and comprehensive panel.      Patient has been advised of split billing requirements and indicates understanding: Yes  COUNSELING:   Reviewed preventive health counseling, as reflected in patient instructions       Regular exercise       Healthy diet/nutrition       Vision screening       Hearing screening       Immunizations    UTD           Aspirin prophylaxis        Alcohol Use        Family planning       Safe sex practices/STD prevention    Estimated body mass index is 25.16 kg/m  as calculated from the following:    Height as of this encounter: 2.032 m (6' 8\").    Weight as of this encounter: 103.9 kg (229 lb).     Weight management plan: Discussed healthy diet and exercise guidelines    He reports that he has never smoked. He has never used smokeless tobacco.    Time spent face-to-face was 15 minutes with more than 50% counseling, review of records and addressing multiple health problems as listed in Assessment Plan in addition to physical..    Counseling Resources:  ATP IV Guidelines  Pooled Cohorts Equation Calculator  FRAX Risk Assessment  ICSI Preventive Guidelines  Dietary Guidelines for Americans, 2010  USDA's MyPlate  ASA Prophylaxis  Lung CA Screening    Camilla Mathur MD  [facility]  "

## 2020-12-05 VITALS
SYSTOLIC BLOOD PRESSURE: 120 MMHG | HEART RATE: 63 BPM | HEIGHT: 78 IN | DIASTOLIC BLOOD PRESSURE: 73 MMHG | OXYGEN SATURATION: 98 % | WEIGHT: 229 LBS | TEMPERATURE: 96.6 F | BODY MASS INDEX: 26.49 KG/M2

## 2020-12-05 ASSESSMENT — ASTHMA QUESTIONNAIRES: ACT_TOTALSCORE: 17

## 2020-12-08 DIAGNOSIS — R53.83 FATIGUE, UNSPECIFIED TYPE: ICD-10-CM

## 2020-12-08 DIAGNOSIS — Z13.220 LIPID SCREENING: ICD-10-CM

## 2020-12-08 DIAGNOSIS — E89.0 POSTSURGICAL HYPOTHYROIDISM: Chronic | ICD-10-CM

## 2020-12-08 DIAGNOSIS — J45.30 MILD PERSISTENT ASTHMA, UNSPECIFIED WHETHER COMPLICATED: ICD-10-CM

## 2020-12-08 DIAGNOSIS — Z00.00 ROUTINE HISTORY AND PHYSICAL EXAMINATION OF ADULT: ICD-10-CM

## 2020-12-08 DIAGNOSIS — C73 PAPILLARY THYROID CARCINOMA (H): ICD-10-CM

## 2020-12-08 LAB
ERYTHROCYTE [DISTWIDTH] IN BLOOD BY AUTOMATED COUNT: 13.1 % (ref 10–15)
HCT VFR BLD AUTO: 43.2 % (ref 40–53)
HGB BLD-MCNC: 14.5 G/DL (ref 13.3–17.7)
MCH RBC QN AUTO: 31 PG (ref 26.5–33)
MCHC RBC AUTO-ENTMCNC: 33.6 G/DL (ref 31.5–36.5)
MCV RBC AUTO: 93 FL (ref 78–100)
PLATELET # BLD AUTO: 175 10E9/L (ref 150–450)
RBC # BLD AUTO: 4.67 10E12/L (ref 4.4–5.9)
WBC # BLD AUTO: 4.2 10E9/L (ref 4–11)

## 2020-12-08 PROCEDURE — 86800 THYROGLOBULIN ANTIBODY: CPT | Mod: 90 | Performed by: INTERNAL MEDICINE

## 2020-12-08 PROCEDURE — 99000 SPECIMEN HANDLING OFFICE-LAB: CPT | Performed by: INTERNAL MEDICINE

## 2020-12-08 PROCEDURE — 80061 LIPID PANEL: CPT | Performed by: INTERNAL MEDICINE

## 2020-12-08 PROCEDURE — 36415 COLL VENOUS BLD VENIPUNCTURE: CPT | Performed by: INTERNAL MEDICINE

## 2020-12-08 PROCEDURE — 80053 COMPREHEN METABOLIC PANEL: CPT | Performed by: INTERNAL MEDICINE

## 2020-12-08 PROCEDURE — 84443 ASSAY THYROID STIM HORMONE: CPT | Performed by: INTERNAL MEDICINE

## 2020-12-08 PROCEDURE — 82306 VITAMIN D 25 HYDROXY: CPT | Performed by: INTERNAL MEDICINE

## 2020-12-08 PROCEDURE — 84432 ASSAY OF THYROGLOBULIN: CPT | Mod: 90 | Performed by: INTERNAL MEDICINE

## 2020-12-08 PROCEDURE — 99N1030 PR STATISTIC REMEASURE THYROGLOBULIN: Mod: 90 | Performed by: INTERNAL MEDICINE

## 2020-12-08 PROCEDURE — 84439 ASSAY OF FREE THYROXINE: CPT | Performed by: INTERNAL MEDICINE

## 2020-12-08 PROCEDURE — 85027 COMPLETE CBC AUTOMATED: CPT | Performed by: INTERNAL MEDICINE

## 2020-12-08 NOTE — LETTER
December 14, 2020      José Augustine  [address] [facility] [address]        Dear ,    The following letter pertains to your most recent diagnostic tests:     My name is Dr. Graham and I am covering for Dr. Mathur  who is out of the office today.     You vitamin D level is OK.  Cholesterol is improved from last check.  Kidney liver and electrolyte tests are OK.  Blood counts are stable.         Bottom line:  The results look stable.         Follow up:  Follow-up with Dr. Mathur as previously directed.       Resulted Orders   Vitamin D Deficiency   Result Value Ref Range    Vitamin D Deficiency screening 31 20 - 75 ug/L      Comment:      Season, race, dietary intake, and treatment affect the concentration of   25-hydroxy-Vitamin D. Values may decrease during winter months and increase   during summer months. Values 20-29 ug/L may indicate Vitamin D insufficiency   and values <20 ug/L may indicate Vitamin D deficiency.  Vitamin D determination is routinely performed by an immunoassay specific for   25 hydroxyvitamin D3.  If an individual is on vitamin D2 (ergocalciferol)   supplementation, please specify 25 OH vitamin D2 and D3 level determination by   LCMSMS test VITD23.     Lipid panel reflex to direct LDL Fasting   Result Value Ref Range    Cholesterol 194 <200 mg/dL    Triglycerides 79 <150 mg/dL    HDL Cholesterol 48 >39 mg/dL    LDL Cholesterol Calculated 130 (H) <100 mg/dL      Comment:      Above desirable:  100-129 mg/dl  Borderline High:  130-159 mg/dL  High:             160-189 mg/dL  Very high:       >189 mg/dl      Non HDL Cholesterol 146 (H) <130 mg/dL      Comment:      Above Desirable:  130-159 mg/dl  Borderline high:  160-189 mg/dl  High:             190-219 mg/dl  Very high:       >219 mg/dl     Comprehensive metabolic panel (BMP + Alb, Alk Phos, ALT, AST, Total. Bili, TP)   Result Value Ref Range    Sodium 140 133 - 144 mmol/L    Potassium 4.4 3.4 - 5.3 mmol/L    Chloride 111  (H) 94 - 109 mmol/L    Carbon Dioxide 29 20 - 32 mmol/L    Anion Gap <1 (L) 3 - 14 mmol/L    Glucose 93 70 - 99 mg/dL    Urea Nitrogen 14 7 - 30 mg/dL    Creatinine 1.02 0.66 - 1.25 mg/dL    GFR Estimate 89 >60 mL/min/[1.73_m2]      Comment:      Non  GFR Calc  Starting 12/18/2018, serum creatinine based estimated GFR (eGFR) will be   calculated using the Chronic Kidney Disease Epidemiology Collaboration   (CKD-EPI) equation.      GFR Estimate If Black >90 >60 mL/min/[1.73_m2]      Comment:       GFR Calc  Starting 12/18/2018, serum creatinine based estimated GFR (eGFR) will be   calculated using the Chronic Kidney Disease Epidemiology Collaboration   (CKD-EPI) equation.      Calcium 8.4 (L) 8.5 - 10.1 mg/dL    Bilirubin Total 0.6 0.2 - 1.3 mg/dL    Albumin 3.8 3.4 - 5.0 g/dL    Protein Total 7.0 6.8 - 8.8 g/dL    Alkaline Phosphatase 57 40 - 150 U/L    ALT 40 0 - 70 U/L    AST 23 0 - 45 U/L   CBC with platelets   Result Value Ref Range    WBC 4.2 4.0 - 11.0 10e9/L    RBC Count 4.67 4.4 - 5.9 10e12/L    Hemoglobin 14.5 13.3 - 17.7 g/dL    Hematocrit 43.2 40.0 - 53.0 %    MCV 93 78 - 100 fl    MCH 31.0 26.5 - 33.0 pg    MCHC 33.6 31.5 - 36.5 g/dL    RDW 13.1 10.0 - 15.0 %    Platelet Count 175 150 - 450 10e9/L       If you have any questions or concerns, please call the clinic at the number listed above.       Sincerely,      Camilla Mathur MD  [facility]

## 2020-12-09 LAB
ALBUMIN SERPL-MCNC: 3.8 G/DL (ref 3.4–5)
ALP SERPL-CCNC: 57 U/L (ref 40–150)
ALT SERPL W P-5'-P-CCNC: 40 U/L (ref 0–70)
ANION GAP SERPL CALCULATED.3IONS-SCNC: <1 MMOL/L (ref 3–14)
AST SERPL W P-5'-P-CCNC: 23 U/L (ref 0–45)
BILIRUB SERPL-MCNC: 0.6 MG/DL (ref 0.2–1.3)
BUN SERPL-MCNC: 14 MG/DL (ref 7–30)
CALCIUM SERPL-MCNC: 8.4 MG/DL (ref 8.5–10.1)
CHLORIDE SERPL-SCNC: 111 MMOL/L (ref 94–109)
CHOLEST SERPL-MCNC: 194 MG/DL
CO2 SERPL-SCNC: 29 MMOL/L (ref 20–32)
CREAT SERPL-MCNC: 1.02 MG/DL (ref 0.66–1.25)
DEPRECATED CALCIDIOL+CALCIFEROL SERPL-MC: 31 UG/L (ref 20–75)
GFR SERPL CREATININE-BSD FRML MDRD: 89 ML/MIN/{1.73_M2}
GLUCOSE SERPL-MCNC: 93 MG/DL (ref 70–99)
HDLC SERPL-MCNC: 48 MG/DL
LDLC SERPL CALC-MCNC: 130 MG/DL
NONHDLC SERPL-MCNC: 146 MG/DL
POTASSIUM SERPL-SCNC: 4.4 MMOL/L (ref 3.4–5.3)
PROT SERPL-MCNC: 7 G/DL (ref 6.8–8.8)
SODIUM SERPL-SCNC: 140 MMOL/L (ref 133–144)
T4 FREE SERPL-MCNC: 1.22 NG/DL (ref 0.76–1.46)
TRIGL SERPL-MCNC: 79 MG/DL
TSH SERPL DL<=0.005 MIU/L-ACNC: 0.09 MU/L (ref 0.4–4)

## 2020-12-11 LAB — LAB SCANNED RESULT: NORMAL

## 2020-12-12 NOTE — RESULT ENCOUNTER NOTE
The following letter pertains to your most recent diagnostic tests:    My name is Dr. Graham and I am covering for Dr. Mathur  who is out of the office today.     You vitamin D level is OK.  Cholesterol is improved from last check.  Kidney liver and electrolyte tests are OK.  Blood counts are stable.        Bottom line:  The results look stable.         Follow up:  Follow-up with Dr. Mathur as previously directed.        Sincerely,    Dr. Graham

## 2020-12-23 DIAGNOSIS — E89.0 POSTSURGICAL HYPOTHYROIDISM: Chronic | ICD-10-CM

## 2020-12-23 DIAGNOSIS — C73 PAPILLARY THYROID CARCINOMA (H): ICD-10-CM

## 2020-12-26 RX ORDER — LEVOTHYROXINE SODIUM 125 UG/1
TABLET ORAL
Qty: 160 TABLET | Refills: 3 | Status: SHIPPED | OUTPATIENT
Start: 2020-12-26 | End: 2021-05-11

## 2020-12-26 NOTE — TELEPHONE ENCOUNTER
levothyroxine (SYNTHROID/LEVOTHROID) 125 MCG tablet   Last Written Prescription Date:  2/4/20  Last Fill Quantity: 160,   # refills: 3  Last Office Visit :  2/4/20  Future Office visit:   5/11/21    tsh abnormal    Reviewed By    Nettie Carolina MD on 12/11/2020  3:48 PM   Nettie Carolina MD on 12/11/2020  3:48 PM     Routing refill request to provider for review/approval because:  provider preference

## 2021-01-18 DIAGNOSIS — J45.30 MILD PERSISTENT ASTHMA WITHOUT COMPLICATION: ICD-10-CM

## 2021-01-20 NOTE — TELEPHONE ENCOUNTER
Routing refill request to provider for review/approval because:  Labs out of range:  ACT    ACT Total Scores 9/13/2019 7/15/2020 12/4/2020   ACT TOTAL SCORE (Goal Greater than or Equal to 20) 22 21 17   In the past 12 months, how many times did you visit the emergency room for your asthma without being admitted to the hospital? 0 0 0   In the past 12 months, how many times were you hospitalized overnight because of your asthma? 0 0 0

## 2021-01-25 ENCOUNTER — MYC MEDICAL ADVICE (OUTPATIENT)
Dept: FAMILY MEDICINE | Facility: CLINIC | Age: 46
End: 2021-01-25

## 2021-02-03 ENCOUNTER — TRANSFERRED RECORDS (OUTPATIENT)
Dept: HEALTH INFORMATION MANAGEMENT | Facility: CLINIC | Age: 46
End: 2021-02-03

## 2021-03-22 DIAGNOSIS — J45.30 MILD PERSISTENT ASTHMA, UNSPECIFIED WHETHER COMPLICATED: ICD-10-CM

## 2021-03-23 NOTE — TELEPHONE ENCOUNTER
Fluticasone-salmeterol 250-50 diskus    Summary: Inhale 1 puff into the lungs every 12 hours, Disp-1 Inhaler, R-1, E-Prescribe   Dose, Route, Frequency: 1 puff, Inhalation, EVERY 12 HOURS  Start: 12/4/2020  Ord/Sold: 12/4/2020

## 2021-03-24 NOTE — TELEPHONE ENCOUNTER
"[employer] message sent asking pt to complete ACT     Routing refill request to provider for review/approval because:  ACT not current       Requested Prescriptions   Pending Prescriptions Disp Refills     fluticasone-salmeterol (ADVAIR) 250-50 MCG/DOSE inhaler       Sig: Inhale 1 puff into the lungs every 12 hours       Inhaled Steroids Protocol Failed - 3/22/2021  7:52 PM        Failed - Asthma control assessment score within normal limits in last 6 months     Please review ACT score.           Passed - Patient is age 12 or older        Passed - Medication is active on med list        Passed - Recent (6 mo) or future (30 days) visit within the authorizing provider's specialty     Patient had office visit in the last 6 months or has a visit in the next 30 days with authorizing provider or within the authorizing provider's specialty.  See \"Patient Info\" tab in inbasket, or \"Choose Columns\" in Meds & Orders section of the refill encounter.           Long-Acting Beta Agonist Inhalers Protocol  Failed - 3/22/2021  7:52 PM        Failed - Asthma control assessment score within normal limits in last 6 months     Please review ACT score.           Passed - Patient is age 12 or older        Passed - Order for Serevent, Striverdi, or Foradil and pt has steroid inhaler        Passed - Medication is active on med list        Passed - Recent (6 mo) or future (30 days) visit within the authorizing provider's specialty     Patient had office visit in the last 6 months or has a visit in the next 30 days with authorizing provider or within the authorizing provider's specialty.  See \"Patient Info\" tab in inbasket, or \"Choose Columns\" in Meds & Orders section of the refill encounter.                 "

## 2021-04-19 DIAGNOSIS — J45.30 MILD PERSISTENT ASTHMA, UNSPECIFIED WHETHER COMPLICATED: ICD-10-CM

## 2021-04-19 NOTE — TELEPHONE ENCOUNTER
Advair 250-50 diskus    Summary: Inhale 1 puff into the lungs every 12 hours, Disp-180 each, R-1, E-Prescribe   Dose, Route, Frequency: 1 puff, Inhalation, EVERY 12 HOURS  Start: 3/24/2021  Ord/Sold: 3/24/2021

## 2021-05-10 ENCOUNTER — MYC MEDICAL ADVICE (OUTPATIENT)
Dept: FAMILY MEDICINE | Facility: CLINIC | Age: 46
End: 2021-05-10

## 2021-05-10 NOTE — PROGRESS NOTES
José is a 45 year old who is being evaluated via a billable video visit.      How would you like to obtain your AVS? [employer]     Email video visit link to: [email or web address]    Will anyone else be joining your video visit? No    Nataly LYONS MA

## 2021-05-11 ENCOUNTER — VIRTUAL VISIT (OUTPATIENT)
Dept: ENDOCRINOLOGY | Facility: CLINIC | Age: 46
End: 2021-05-11
Payer: COMMERCIAL

## 2021-05-11 DIAGNOSIS — C73 PAPILLARY THYROID CARCINOMA (H): ICD-10-CM

## 2021-05-11 DIAGNOSIS — E89.0 POSTSURGICAL HYPOTHYROIDISM: Chronic | ICD-10-CM

## 2021-05-11 PROCEDURE — 99214 OFFICE O/P EST MOD 30 MIN: CPT | Mod: GT

## 2021-05-11 RX ORDER — LEVOTHYROXINE SODIUM 125 UG/1
TABLET ORAL
Qty: 160 TABLET | Refills: 4 | Status: SHIPPED | OUTPATIENT
Start: 2021-05-11 | End: 2021-05-11

## 2021-05-11 RX ORDER — LEVOTHYROXINE SODIUM 125 UG/1
TABLET ORAL
Qty: 170 TABLET | Refills: 4 | Status: SHIPPED | OUTPATIENT
Start: 2021-05-11 | End: 2022-06-02

## 2021-05-11 NOTE — LETTER
5/11/2021       RE: José Augustine  [address] [facility] [address]     Dear Colleague,    Thank you for referring your patient, José Augustine, to the [facility] ENDOCRINOLOG[facility]IC [place] at [facility]. Please see a copy of my visit note below.    José is a 45 year old who is being evaluated via a billable video visit.      How would you like to obtain your AVS? WhatsNexx     Email video visit link to: [email or web address]    Will anyone else be joining your video visit? No    Nataly LYONS MA          Endocrinology Consult Note    Attending ASSESSMENT/PLAN:     1. papillary thyroid carcinoma, 1.5 cm with extrathyroid extension, node positive, BRAF mutant positive.  He has been treated with surgery, 100 mCi 131I.    pT3, pN1a, pMx, cM0  JESUS recurrence risk intermediate  US before next appt in a year       2.  Post surgical hypothyroidism.    Treat to TSH < 0.4. .   He is currently using 13.5 of the 125 mcg tablets/week     Due to the COVID 19 pandemic this visit was a telephone/video visit in order to help prevent spread of infection in this high risk patient and the general population. The patient gave verbal consent for the visit today.    I have independently reviewed and interpreted labs, imaging as indicated.     Chart review/prep time 1  [id number]   Visit Start time 1502  Visit Stop time 1508  _18_ minutes spent on the date of the encounter doing chart review, history and exam, documentation and further activities as noted above.    Nettie Carolina MD      Cc/ HISTORY OF PRESENT ILLNESS José presents for follow up ofthyroid cancer, post surgical hypothyroidism.He had + BRAF mutation on the FNAB specimen. He was last seen by me 2/2020.  At that time he was on  13.5 tablets/week, using the 125 mcg tablets (241 mcg/day average).  He continues on this dose.      The  thyroid cancer treatment course has been as follows:   12/9/13 Total  thyroidectomy, central neck dissection removing right PCT 1.5 cm, extending to extrathyroidal fatty tissue (foca ETE).  6/10 level 6 LN were positive for PCT.   MACIS:Total 4.55 if invasion and no distant  pT3, pN1a, pMx  8/13/14 131I 100 mCi after 2 dose Thyrogen stimulation.    11/12/15 thyrogen stimulated 3 mCi 123I TBS  negative. Day 5 thyrogen stimulated Tg was  048 with TSH 16.4.       We have the following relevant lab data:  6/30/11 TSh 0.99  10/3/12: vitamin D 16.4  9/4/13: TSh 1.07, calcium 9.1, BUN17, creatinin0.85, alk phos 54, vitamin D 41.7  1/21/14: Tg 0.48, JESUS < 0.4, TSH 9.77  5/20/14: Tg 0.31, JESUS < 0.4, TSH1.02  8/13/14: Tg 0.89, JESUS < 0.4,  -  8/18/14 post therapy scan:  neck uptake  10/14/14: Tg 0.14, JESUS < 0.4, TSH 1.19  12/19/14 TSH 0.3, free T4 1.39  4/14/15: Tg 0.17, JESUS < 0.4, TSH 0.07  11/13/15: Tg 0.48, JESUS < 0.4, TSH 16,40  4/18/16: Tg 0.14, JESUS < 0.4,  12/20/17: Tg 0.16, JESUS < 0.4, TSH 0.02, free T4 1.57-   2/4/19: Tg 0.14, JESUS < 0.4, TSH 1.4, free T4 1.02 -   2/4/2020 Tg 0.28, JESUS < 0.4- TSH 0.75, free T4 1.14  12/8/2020: Tg 0.12, JESUS < 0.4, TSH 0.09, free T4 1.22, Ca 8.4, creatinine 1.02.      2/6/2020: neck US compared with 12/26/17  Right level 4 # 1 0.4 x 0.6 x 0.8 cm - not seen 2017 but cine not comparable.   Right level 6 # 1 0.3 x  0.3 x 0.6 cm - ? Not seen 2017    REVIEW OF SYSTEMS  Has not had covid  Had covid vaccine;   Weight is stable.   Cardiac: negative  Respiratory: negative; except allergies were worse this fall - changed astham medication which helped  GI: negative    Past Medical History  Past Medical History:   Diagnosis Date     Allergic state      Asthma age 12     Eczema      Papillary thyroid carcinoma (H) 12/9/13    right 1.5 cm, ETE, LN+, BRAF+     Postsurgical hypothyroidism 12/9/13     Thyroid nodule        Past Surgical History:   Procedure Laterality Date     ORTHOPEDIC SURGERY      shoulder labral repair     THYROIDECTOMY  12/9/2013    Procedure:  "THYROIDECTOMY;  Total Thyroidectomy, Central Neck Dissection ;  Surgeon: Pillo Henley MD;  Location: [facility] OR       Medications  Current Outpatient Medications   Medication Sig Dispense Refill     albuterol (PROAIR HFA/PROVENTIL HFA/VENTOLIN HFA) 108 (90 Base) MCG/ACT inhaler Inhale 2 puffs into the lungs every 6 hours as needed for shortness of breath / dyspnea or wheezing 1 Inhaler 2     fluticasone-salmeterol (ADVAIR) 250-50 MCG/DOSE inhaler Inhale 1 puff into the lungs every 12 hours 180 each 1     levothyroxine (SYNTHROID/LEVOTHROID) 125 MCG tablet MON to SAT 2 tablet/day; SUN 1.5 tablet 160 tablet 3     Probiotic Product (PROBIOTIC ADVANCED PO)        Supplements:   Vitamin D 2000 international unit(s)/day -    Allergies  No Known Allergies    Family History  Family History   Problem Relation Age of Onset     Cancer Father         skin     Diabetes Other      Thyroid Cancer No family hx of      Thyroid Disease No family hx of      Social History  Social History     Tobacco Use     Smoking status: Never Smoker     Smokeless tobacco: Never Used   Substance Use Topics     Alcohol use: Yes     Comment: weekly     Drug use: No     Always worked from home so this is not a change.  kids are in school 4 days/week;     Physical Exam  There were no vitals taken for this visit.  There is no height or weight on file to calculate BMI.   BP Readings from Last 1 Encounters:   12/05/20 120/73      Pulse Readings from Last 1 Encounters:   12/05/20 63      Resp Readings from Last 1 Encounters:   03/17/19 15      Temp Readings from Last 1 Encounters:   12/04/20 96.6  F (35.9  C) (Tympanic)      SpO2 Readings from Last 1 Encounters:   12/05/20 98%      Wt Readings from Last 1 Encounters:   12/04/20 103.9 kg (229 lb)      Ht Readings from Last 1 Encounters:   12/04/20 2.032 m (6' 8\")     GENERAL: pleasant young man in  no distress, sitting at desk  SKIN: Visible skin clear  EYES: Eyes grossly normal to inspection.    NECK: no visible " masses;   RESP: No audible wheeze, cough.  No visible retractions or increased work of breathing.    NEURO: Awake, alert, responds appropriately to questions.  Mentation and speech fluent.  PSYCH:affect normal, and appearance well-groomed.      DATA REVIEW      EXAMINATION: US HEAD NECK SOFT TISSUE, 2/6/2020 1:10 PM      COMPARISON: 12/26/2017, 4/18/2016.     HISTORY: Postsurgical hypothyroidism.     TECHNIQUE: Sonographic imaging performed of the neck to evaluate for  lymph nodes using grey scale and limited Doppler technique.     FINDINGS:  Lymph nodes are measured bilaterally with measurements given in  transverse, AP, and craniocaudal dimensions as follows:     Right:  Level 2:   #1: 1.1 x 0.5 x 1.4 cm, previously 0.9 x 0.5 x 1.1 cm. Normal fatty  hilum.  #2: 1.0 x 0.5 x 1.7 cm, previously 1.1 x 0.6 x 1.6 cm. Normal fatty  hilum.  Level 4:   #1: 0.4 x 0.6 x 0.8 cm, not seen previously. Normal fatty hilum.  Level 6:   #1: 0.3 x 0.3 x 0.6 cm, not measured previously but grossly stable.  Normal fatty hilum.     Left:  Level 2:   #1: 1.0 x 0.6 x 1.4 cm, previously 1.1 x 0.8 x 1.5 cm. Normal fatty  hilum.  #2: 1.2 x 0.5 x 2.2 cm, previously 1.3 x 0.5 x 2.0 cm. Normal fatty  hilum.                                                                     IMPRESSION:  Soft tissue neck ultrasound with lymph node measurements as described  above.     I have personally reviewed the examination and initial interpretation  and I agree with the findings.     KAMRAN GREER MD

## 2021-05-11 NOTE — PROGRESS NOTES
Endocrinology Consult Note    Attending ASSESSMENT/PLAN:     1. papillary thyroid carcinoma, 1.5 cm with extrathyroid extension, node positive, BRAF mutant positive.  He has been treated with surgery, 100 mCi 131I.    pT3, pN1a, pMx, cM0  JESUS recurrence risk intermediate  US before next appt in a year       2.  Post surgical hypothyroidism.    Treat to TSH < 0.4. .   He is currently using 13.5 of the 125 mcg tablets/week     Due to the COVID 19 pandemic this visit was a telephone/video visit in order to help prevent spread of infection in this high risk patient and the general population. The patient gave verbal consent for the visit today.    I have independently reviewed and interpreted labs, imaging as indicated.     Chart review/prep time 1  [id number]   Visit Start time 1502  Visit Stop time 1508  _18_ minutes spent on the date of the encounter doing chart review, history and exam, documentation and further activities as noted above.    Nettie Carolina MD      Cc/ HISTORY OF PRESENT ILLNESS Lars presents for follow up ofthyroid cancer, post surgical hypothyroidism.He had + BRAF mutation on the FNAB specimen. He was last seen by me 2/2020.  At that time he was on  13.5 tablets/week, using the 125 mcg tablets (241 mcg/day average).  He continues on this dose.      The  thyroid cancer treatment course has been as follows:   12/9/13 Total thyroidectomy, central neck dissection removing right PCT 1.5 cm, extending to extrathyroidal fatty tissue (foca ETE).  6/10 level 6 LN were positive for PCT.   MACIS:Total 4.55 if invasion and no distant  pT3, pN1a, pMx  8/13/14 131I 100 mCi after 2 dose Thyrogen stimulation.    11/12/15 thyrogen stimulated 3 mCi 123I TBS  negative. Day 5 thyrogen stimulated Tg was  048 with TSH 16.4.       We have the following relevant lab data:  6/30/11 TSh 0.99  10/3/12: vitamin D 16.4  9/4/13: TSh 1.07, calcium 9.1, BUN17, creatinin0.85, alk phos 54, vitamin D 41.7  1/21/14: Tg 0.48, JESUS <  0.4, TSH 9.77  5/20/14: Tg 0.31, JESUS < 0.4, TSH1.02  8/13/14: Tg 0.89, JESUS < 0.4,  -  8/18/14 post therapy scan:  neck uptake  10/14/14: Tg 0.14, JESUS < 0.4, TSH 1.19  12/19/14 TSH 0.3, free T4 1.39  4/14/15: Tg 0.17, JESUS < 0.4, TSH 0.07  11/13/15: Tg 0.48, JESUS < 0.4, TSH 16,40  4/18/16: Tg 0.14, JESUS < 0.4,  12/20/17: Tg 0.16, JESUS < 0.4, TSH 0.02, free T4 1.57-   2/4/19: Tg 0.14, JESUS < 0.4, TSH 1.4, free T4 1.02 -   2/4/2020 Tg 0.28, JESUS < 0.4- TSH 0.75, free T4 1.14  12/8/2020: Tg 0.12, JESUS < 0.4, TSH 0.09, free T4 1.22, Ca 8.4, creatinine 1.02.      2/6/2020: neck US compared with 12/26/17  Right level 4 # 1 0.4 x 0.6 x 0.8 cm - not seen 2017 but cine not comparable.   Right level 6 # 1 0.3 x  0.3 x 0.6 cm - ? Not seen 2017    REVIEW OF SYSTEMS  Has not had covid  Had covid vaccine;   Weight is stable.   Cardiac: negative  Respiratory: negative; except allergies were worse this fall - changed astham medication which helped  GI: negative    Past Medical History  Past Medical History:   Diagnosis Date     Allergic state      Asthma age 12     Eczema      Papillary thyroid carcinoma (H) 12/9/13    right 1.5 cm, ETE, LN+, BRAF+     Postsurgical hypothyroidism 12/9/13     Thyroid nodule        Past Surgical History:   Procedure Laterality Date     ORTHOPEDIC SURGERY      shoulder labral repair     THYROIDECTOMY  12/9/2013    Procedure: THYROIDECTOMY;  Total Thyroidectomy, Central Neck Dissection ;  Surgeon: Pillo Henley MD;  Location: [facility] OR       Medications  Current Outpatient Medications   Medication Sig Dispense Refill     albuterol (PROAIR HFA/PROVENTIL HFA/VENTOLIN HFA) 108 (90 Base) MCG/ACT inhaler Inhale 2 puffs into the lungs every 6 hours as needed for shortness of breath / dyspnea or wheezing 1 Inhaler 2     fluticasone-salmeterol (ADVAIR) 250-50 MCG/DOSE inhaler Inhale 1 puff into the lungs every 12 hours 180 each 1     levothyroxine (SYNTHROID/LEVOTHROID) 125 MCG tablet MON to SAT 2 tablet/day; SUN  "1.5 tablet 160 tablet 3     Probiotic Product (PROBIOTIC ADVANCED PO)        Supplements:   Vitamin D 2000 international unit(s)/day -    Allergies  No Known Allergies    Family History  Family History   Problem Relation Age of Onset     Cancer Father         skin     Diabetes Other      Thyroid Cancer No family hx of      Thyroid Disease No family hx of      Social History  Social History     Tobacco Use     Smoking status: Never Smoker     Smokeless tobacco: Never Used   Substance Use Topics     Alcohol use: Yes     Comment: weekly     Drug use: No     Always worked from home so this is not a change.  kids are in school 4 days/week;     Physical Exam  There were no vitals taken for this visit.  There is no height or weight on file to calculate BMI.   BP Readings from Last 1 Encounters:   12/05/20 120/73      Pulse Readings from Last 1 Encounters:   12/05/20 63      Resp Readings from Last 1 Encounters:   03/17/19 15      Temp Readings from Last 1 Encounters:   12/04/20 96.6  F (35.9  C) (Tympanic)      SpO2 Readings from Last 1 Encounters:   12/05/20 98%      Wt Readings from Last 1 Encounters:   12/04/20 103.9 kg (229 lb)      Ht Readings from Last 1 Encounters:   12/04/20 2.032 m (6' 8\")     GENERAL: pleasant young man in  no distress, sitting at desk  SKIN: Visible skin clear  EYES: Eyes grossly normal to inspection.    NECK: no visible masses;   RESP: No audible wheeze, cough.  No visible retractions or increased work of breathing.    NEURO: Awake, alert, responds appropriately to questions.  Mentation and speech fluent.  PSYCH:affect normal, and appearance well-groomed.      DATA REVIEW      EXAMINATION: US HEAD NECK SOFT TISSUE, 2/6/2020 1:10 PM      COMPARISON: 12/26/2017, 4/18/2016.     HISTORY: Postsurgical hypothyroidism.     TECHNIQUE: Sonographic imaging performed of the neck to evaluate for  lymph nodes using grey scale and limited Doppler technique.     FINDINGS:  Lymph nodes are measured bilaterally " with measurements given in  transverse, AP, and craniocaudal dimensions as follows:     Right:  Level 2:   #1: 1.1 x 0.5 x 1.4 cm, previously 0.9 x 0.5 x 1.1 cm. Normal fatty  hilum.  #2: 1.0 x 0.5 x 1.7 cm, previously 1.1 x 0.6 x 1.6 cm. Normal fatty  hilum.  Level 4:   #1: 0.4 x 0.6 x 0.8 cm, not seen previously. Normal fatty hilum.  Level 6:   #1: 0.3 x 0.3 x 0.6 cm, not measured previously but grossly stable.  Normal fatty hilum.     Left:  Level 2:   #1: 1.0 x 0.6 x 1.4 cm, previously 1.1 x 0.8 x 1.5 cm. Normal fatty  hilum.  #2: 1.2 x 0.5 x 2.2 cm, previously 1.3 x 0.5 x 2.0 cm. Normal fatty  hilum.                                                                         IMPRESSION:  Soft tissue neck ultrasound with lymph node measurements as described  above.     I have personally reviewed the examination and initial interpretation  and I agree with the findings.     KAMRAN GREER MD

## 2021-05-18 ASSESSMENT — ASTHMA QUESTIONNAIRES: ACT_TOTALSCORE: 23

## 2021-07-08 ENCOUNTER — VIRTUAL VISIT (OUTPATIENT)
Dept: FAMILY MEDICINE | Facility: CLINIC | Age: 46
End: 2021-07-08
Payer: COMMERCIAL

## 2021-07-08 DIAGNOSIS — Z20.822 COVID-19 RULED OUT: Primary | ICD-10-CM

## 2021-07-08 DIAGNOSIS — R68.83 CHILLS (WITHOUT FEVER): ICD-10-CM

## 2021-07-08 PROCEDURE — 99213 OFFICE O/P EST LOW 20 MIN: CPT | Mod: 95 | Performed by: FAMILY MEDICINE

## 2021-07-08 NOTE — PROGRESS NOTES
"José is a 46 year old who is being evaluated via a billable video visit.      How would you like to obtain your AVS? MyChart  If the video visit is dropped, the invitation should be resent by: Text to cell phone: [phone]  Will anyone else be joining your video visit? No      Video Start Time: 4:00pm    Assessment & Plan     COVID-19 ruled out  Patient does not have any fever however he has some chills.  He is vaccinated but given pandemic will get him Covid test to rule out that.  - REVIEW OF HEALTH MAINTENANCE PROTOCOL ORDERS  - Symptomatic COVID-19 Virus (Coronavirus) by PCR; Future    Chills (without fever)  Patient also have some recent visit to rule [place] however he does not feel he is exposed to anything but would like to get Lyme disease CBC which is appropriate.  Which is ordered.  Can call at our clinic and get it done.  - CBC with platelets FUTURE anytime; Future  - Lyme Disease Sylvia with reflex to WB Serum; Future         BMI:   Estimated body mass index is 25.16 kg/m  as calculated from the following:    Height as of 12/4/20: 2.032 m (6' 8\").    Weight as of 12/4/20: 103.9 kg (229 lb).     Gregorio Russ MD  [facility]    Subjective   José is a 46 year old who presents for the following health issues  HPI     Concern - Chills and overall body aches  Onset: Monday night  Description: all the time feeling  Intensity: moderate  Progression of Symptoms:  worsening  Accompanying Signs & Symptoms: teeth chattering like feeling with chills, all over body aches feeling in the muscles, \"creakiness\" and \"soreness\" type feeling, loose stool and minor headaches started this morning.   Previous history of similar problem: no  Precipitating factors:        Worsened by: none  Alleviating factors:        Improved by: advil helps with the chills, bundling up  Therapies tried and outcome: advil, bundling up    Patient denies being around anyone that has been sick        Review of Systems "   Constitutional, HEENT, cardiovascular, pulmonary, gi and gu systems are negative, except as otherwise noted.      Objective           Vitals:  No vitals were obtained today due to virtual visit.    Physical Exam   GENERAL: Healthy, alert and no distress  EYES: Eyes grossly normal to inspection.  No discharge or erythema, or obvious scleral/conjunctival abnormalities.  RESP: No audible wheeze, cough, or visible cyanosis.  No visible retractions or increased work of breathing.    SKIN: Visible skin clear. No significant rash, abnormal pigmentation or lesions.  NEURO: Cranial nerves grossly intact.  Mentation and speech appropriate for age.  PSYCH: Mentation appears normal, affect normal/bright, judgement and insight intact, normal speech and appearance well-groomed.                Video-Visit Details    Type of service:  Video Visit    Video End Time:4:12 PM    Originating Location (pt. Location): Home    Distant Location (provider location):  [facility]     Platform used for Video Visit: [employer]

## 2021-07-09 ENCOUNTER — TELEPHONE (OUTPATIENT)
Dept: FAMILY MEDICINE | Facility: CLINIC | Age: 46
End: 2021-07-09

## 2021-07-09 DIAGNOSIS — Z20.822 COVID-19 RULED OUT: ICD-10-CM

## 2021-07-09 DIAGNOSIS — R68.83 CHILLS (WITHOUT FEVER): ICD-10-CM

## 2021-07-09 LAB
ERYTHROCYTE [DISTWIDTH] IN BLOOD BY AUTOMATED COUNT: 12.5 % (ref 10–15)
HCT VFR BLD AUTO: 44 % (ref 40–53)
HGB BLD-MCNC: 15 G/DL (ref 13.3–17.7)
LABORATORY COMMENT REPORT: NORMAL
MCH RBC QN AUTO: 31.6 PG (ref 26.5–33)
MCHC RBC AUTO-ENTMCNC: 34.1 G/DL (ref 31.5–36.5)
MCV RBC AUTO: 93 FL (ref 78–100)
PLATELET # BLD AUTO: 126 10E9/L (ref 150–450)
RBC # BLD AUTO: 4.75 10E12/L (ref 4.4–5.9)
SARS-COV-2 RNA RESP QL NAA+PROBE: NEGATIVE
SARS-COV-2 RNA RESP QL NAA+PROBE: NORMAL
SPECIMEN SOURCE: NORMAL
SPECIMEN SOURCE: NORMAL
WBC # BLD AUTO: 3.4 10E9/L (ref 4–11)

## 2021-07-09 PROCEDURE — 86618 LYME DISEASE ANTIBODY: CPT | Performed by: FAMILY MEDICINE

## 2021-07-09 PROCEDURE — U0005 INFEC AGEN DETEC AMPLI PROBE: HCPCS | Performed by: FAMILY MEDICINE

## 2021-07-09 PROCEDURE — 36415 COLL VENOUS BLD VENIPUNCTURE: CPT | Performed by: FAMILY MEDICINE

## 2021-07-09 PROCEDURE — U0003 INFECTIOUS AGENT DETECTION BY NUCLEIC ACID (DNA OR RNA); SEVERE ACUTE RESPIRATORY SYNDROME CORONAVIRUS 2 (SARS-COV-2) (CORONAVIRUS DISEASE [COVID-19]), AMPLIFIED PROBE TECHNIQUE, MAKING USE OF HIGH THROUGHPUT TECHNOLOGIES AS DESCRIBED BY CMS-2020-01-R: HCPCS | Performed by: FAMILY MEDICINE

## 2021-07-09 PROCEDURE — 85027 COMPLETE CBC AUTOMATED: CPT | Performed by: FAMILY MEDICINE

## 2021-07-09 NOTE — TELEPHONE ENCOUNTER
Can pt please do the blood work when he comes for the COVID test . Started feer 99.8 an feeling chills/sweating , fatigued , weak , muscle discomfort . .Lesvia Winston RN

## 2021-07-10 LAB — B BURGDOR IGG+IGM SER QL: 0.14 (ref 0–0.89)

## 2021-07-11 ENCOUNTER — MYC MEDICAL ADVICE (OUTPATIENT)
Dept: FAMILY MEDICINE | Facility: CLINIC | Age: 46
End: 2021-07-11

## 2021-07-12 ENCOUNTER — TELEPHONE (OUTPATIENT)
Dept: FAMILY MEDICINE | Facility: CLINIC | Age: 46
End: 2021-07-12

## 2021-07-12 NOTE — TELEPHONE ENCOUNTER
----- Message from Gregorio Russ MD sent at 7/12/2021 12:01 PM CDT -----      I have reviewed your recent labs. Here are the results:    COVID test is negative.  Lyme titers are also with normal range. As you mention in notes and picture, it is reasonable for antibiotics. I have seen you did a virtual visit and you were prescribed antibiotics which is reasonable.  Please feel free to contact us and follow up if any change in symptoms.    MD Gregorio Holloway MD  7/12/2021

## 2021-07-23 DIAGNOSIS — J45.30 MILD PERSISTENT ASTHMA, UNSPECIFIED WHETHER COMPLICATED: ICD-10-CM

## 2021-10-24 ENCOUNTER — HEALTH MAINTENANCE LETTER (OUTPATIENT)
Age: 46
End: 2021-10-24

## 2021-10-25 DIAGNOSIS — J45.30 MILD PERSISTENT ASTHMA, UNSPECIFIED WHETHER COMPLICATED: ICD-10-CM

## 2021-10-26 NOTE — TELEPHONE ENCOUNTER
ACT Total Scores 7/15/2020 12/4/2020 5/17/2021   ACT TOTAL SCORE (Goal Greater than or Equal to 20) 21 17 23   In the past 12 months, how many times did you visit the emergency room for your asthma without being admitted to the hospital? 0 0 0   In the past 12 months, how many times were you hospitalized overnight because of your asthma? 0 0 0

## 2022-01-31 DIAGNOSIS — J45.30 MILD PERSISTENT ASTHMA, UNSPECIFIED WHETHER COMPLICATED: ICD-10-CM

## 2022-01-31 NOTE — TELEPHONE ENCOUNTER
LOV 12-4-2020 Kevan  No future OV scheduled    One month pended  SIG/Pharm note given  MyChart sent    RT [place] (R)

## 2022-02-02 NOTE — TELEPHONE ENCOUNTER
Routing refill request to provider for review/approval because:  Tere given x1 and patient did not follow up, please advise    Last office vist: 12/4/2020 with PCP    Pended 30 day supply & 0 refills. Please Review.    Next office visit: none- mychart message sent to patient to schedule OV.  Routing to TC to schedule since patient not active [facility] user.       Brijesh Johnson RN  [facility]

## 2022-02-13 ENCOUNTER — HEALTH MAINTENANCE LETTER (OUTPATIENT)
Age: 47
End: 2022-02-13

## 2022-04-13 ENCOUNTER — OFFICE VISIT (OUTPATIENT)
Dept: FAMILY MEDICINE | Facility: CLINIC | Age: 47
End: 2022-04-13
Payer: COMMERCIAL

## 2022-04-13 VITALS
DIASTOLIC BLOOD PRESSURE: 79 MMHG | TEMPERATURE: 96.9 F | OXYGEN SATURATION: 98 % | HEIGHT: 78 IN | BODY MASS INDEX: 27.42 KG/M2 | SYSTOLIC BLOOD PRESSURE: 128 MMHG | WEIGHT: 237 LBS | RESPIRATION RATE: 12 BRPM | HEART RATE: 64 BPM

## 2022-04-13 DIAGNOSIS — E83.51 HYPOCALCEMIA: ICD-10-CM

## 2022-04-13 DIAGNOSIS — E89.0 POSTSURGICAL HYPOTHYROIDISM: ICD-10-CM

## 2022-04-13 DIAGNOSIS — E78.5 DYSLIPIDEMIA: ICD-10-CM

## 2022-04-13 DIAGNOSIS — L30.9 ECZEMA, UNSPECIFIED TYPE: ICD-10-CM

## 2022-04-13 DIAGNOSIS — Z00.00 ROUTINE HISTORY AND PHYSICAL EXAMINATION OF ADULT: Primary | ICD-10-CM

## 2022-04-13 DIAGNOSIS — D72.819 LEUKOPENIA, UNSPECIFIED TYPE: ICD-10-CM

## 2022-04-13 DIAGNOSIS — D69.6 THROMBOCYTOPENIA (H): ICD-10-CM

## 2022-04-13 DIAGNOSIS — J45.30 MILD PERSISTENT ASTHMA, UNSPECIFIED WHETHER COMPLICATED: ICD-10-CM

## 2022-04-13 DIAGNOSIS — R79.89 LOW VITAMIN D LEVEL: ICD-10-CM

## 2022-04-13 DIAGNOSIS — J45.30 MILD PERSISTENT ASTHMA WITHOUT COMPLICATION: ICD-10-CM

## 2022-04-13 LAB
ALBUMIN SERPL-MCNC: 4 G/DL (ref 3.4–5)
ALP SERPL-CCNC: 59 U/L (ref 40–150)
ALT SERPL W P-5'-P-CCNC: 36 U/L (ref 0–70)
ANION GAP SERPL CALCULATED.3IONS-SCNC: 4 MMOL/L (ref 3–14)
AST SERPL W P-5'-P-CCNC: 21 U/L (ref 0–45)
BILIRUB SERPL-MCNC: 0.6 MG/DL (ref 0.2–1.3)
BUN SERPL-MCNC: 21 MG/DL (ref 7–30)
CA-I BLD-MCNC: 4.7 MG/DL (ref 4.4–5.2)
CALCIUM SERPL-MCNC: 9.3 MG/DL (ref 8.5–10.1)
CHLORIDE BLD-SCNC: 110 MMOL/L (ref 94–109)
CHOLEST SERPL-MCNC: 225 MG/DL
CO2 SERPL-SCNC: 29 MMOL/L (ref 20–32)
CREAT SERPL-MCNC: 1.02 MG/DL (ref 0.66–1.25)
DEPRECATED CALCIDIOL+CALCIFEROL SERPL-MC: 48 UG/L (ref 20–75)
ERYTHROCYTE [DISTWIDTH] IN BLOOD BY AUTOMATED COUNT: 12.8 % (ref 10–15)
FASTING STATUS PATIENT QL REPORTED: YES
GFR SERPL CREATININE-BSD FRML MDRD: >90 ML/MIN/1.73M2
GLUCOSE BLD-MCNC: 101 MG/DL (ref 70–99)
HCT VFR BLD AUTO: 47.6 % (ref 40–53)
HDLC SERPL-MCNC: 51 MG/DL
HGB BLD-MCNC: 16 G/DL (ref 13.3–17.7)
LDLC SERPL CALC-MCNC: 156 MG/DL
MCH RBC QN AUTO: 31.3 PG (ref 26.5–33)
MCHC RBC AUTO-ENTMCNC: 33.6 G/DL (ref 31.5–36.5)
MCV RBC AUTO: 93 FL (ref 78–100)
NONHDLC SERPL-MCNC: 174 MG/DL
PLATELET # BLD AUTO: 203 10E3/UL (ref 150–450)
POTASSIUM BLD-SCNC: 4.6 MMOL/L (ref 3.4–5.3)
PROT SERPL-MCNC: 7.7 G/DL (ref 6.8–8.8)
RBC # BLD AUTO: 5.12 10E6/UL (ref 4.4–5.9)
SODIUM SERPL-SCNC: 143 MMOL/L (ref 133–144)
TRIGL SERPL-MCNC: 91 MG/DL
WBC # BLD AUTO: 4.8 10E3/UL (ref 4–11)

## 2022-04-13 PROCEDURE — 80061 LIPID PANEL: CPT | Performed by: INTERNAL MEDICINE

## 2022-04-13 PROCEDURE — 99214 OFFICE O/P EST MOD 30 MIN: CPT | Mod: 25 | Performed by: INTERNAL MEDICINE

## 2022-04-13 PROCEDURE — 80053 COMPREHEN METABOLIC PANEL: CPT | Performed by: INTERNAL MEDICINE

## 2022-04-13 PROCEDURE — 85027 COMPLETE CBC AUTOMATED: CPT | Performed by: INTERNAL MEDICINE

## 2022-04-13 PROCEDURE — 99396 PREV VISIT EST AGE 40-64: CPT | Performed by: INTERNAL MEDICINE

## 2022-04-13 PROCEDURE — 82306 VITAMIN D 25 HYDROXY: CPT | Performed by: INTERNAL MEDICINE

## 2022-04-13 PROCEDURE — 36415 COLL VENOUS BLD VENIPUNCTURE: CPT | Performed by: INTERNAL MEDICINE

## 2022-04-13 PROCEDURE — 82330 ASSAY OF CALCIUM: CPT | Performed by: INTERNAL MEDICINE

## 2022-04-13 RX ORDER — ALBUTEROL SULFATE 90 UG/1
2 AEROSOL, METERED RESPIRATORY (INHALATION) EVERY 6 HOURS PRN
Qty: 18 G | Refills: 3 | Status: SHIPPED | OUTPATIENT
Start: 2022-04-13

## 2022-04-13 ASSESSMENT — ENCOUNTER SYMPTOMS
HEARTBURN: 0
FREQUENCY: 0
PALPITATIONS: 0
SORE THROAT: 0
COUGH: 0
SHORTNESS OF BREATH: 0
HEMATOCHEZIA: 0
EYE PAIN: 0
ARTHRALGIAS: 0
FEVER: 0
DYSURIA: 0
CONSTIPATION: 0
JOINT SWELLING: 0
ABDOMINAL PAIN: 0
MYALGIAS: 0
PARESTHESIAS: 0
DIZZINESS: 0
HEMATURIA: 0
WEAKNESS: 0
NAUSEA: 0
DIARRHEA: 0
CHILLS: 0
HEADACHES: 0
NERVOUS/ANXIOUS: 0

## 2022-04-13 ASSESSMENT — PAIN SCALES - GENERAL: PAINLEVEL: NO PAIN (0)

## 2022-04-13 ASSESSMENT — ASTHMA QUESTIONNAIRES: ACT_TOTALSCORE: 23

## 2022-04-13 NOTE — PROGRESS NOTES
SUBJECTIVE:   CC: José Augustine is an 46 year old male who presents for preventative health visit.       Patient has been advised of split billing requirements and indicates understanding: Yes  Healthy Habits:     Getting at least 3 servings of Calcium per day:  Yes    Bi-annual eye exam:  Yes    Dental care twice a year:  Yes    Sleep apnea or symptoms of sleep apnea:  None    Diet:  Gluten-free/reduced    Frequency of exercise:  2-3 days/week    Duration of exercise:  15-30 minutes    Taking medications regularly:  Yes    Medication side effects:  None    PHQ-2 Total Score: 0    Additional concerns today:  No      Today's PHQ-2 Score:   PHQ-2 ( 1999 [employer]) 4/13/2022   Q1: Little interest or pleasure in doing things 0   Q2: Feeling down, depressed or hopeless 0   PHQ-2 Score 0   PHQ-2 Total Score (12-17 Years)- Positive if 3 or more points; Administer PHQ-A if positive -   Q1: Little interest or pleasure in doing things Not at all   Q2: Feeling down, depressed or hopeless Not at all   PHQ-2 Score 0       Abuse: Current or Past(Physical, Sexual or Emotional)- No  Do you feel safe in your environment? Yes    Have you ever done Advance Care Planning? (For example, a Health Directive, POLST, or a discussion with a medical provider or your loved ones about your wishes): Yes, patient states has an Advance Care Planning document and will bring a copy to the clinic.    Social History     Tobacco Use     Smoking status: Never Smoker     Smokeless tobacco: Never Used   Substance Use Topics     Alcohol use: Yes     Comment: weekly; 4 x a week, 1-2 glass of wine     If you drink alcohol do you typically have >3 drinks per day or >7 drinks per week? No    Alcohol Use 4/13/2022   Prescreen: >3 drinks/day or >7 drinks/week? No   Prescreen: >3 drinks/day or >7 drinks/week? -       Last PSA: No results found for: PSA    Reviewed orders with patient. Reviewed health maintenance and updated orders accordingly - Yes  Lab work is in  process  Labs reviewed in EPIC  BP Readings from Last 3 Encounters:   04/13/22 128/79   12/05/20 120/73   02/04/20 (!) 143/76    Wt Readings from Last 3 Encounters:   04/13/22 107.5 kg (237 lb)   12/04/20 103.9 kg (229 lb)   02/04/20 107.7 kg (237 lb 6.4 oz)                  Patient Active Problem List   Diagnosis     Muscle calcification     Postsurgical hypothyroidism     Papillary thyroid carcinoma     Chronic right-sided low back pain without sciatica     Past Surgical History:   Procedure Laterality Date     ORTHOPEDIC SURGERY      shoulder labral repair     THYROIDECTOMY  12/9/2013    Procedure: THYROIDECTOMY;  Total Thyroidectomy, Central Neck Dissection ;  Surgeon: Pillo Henley MD;  Location:  OR       Social History     Tobacco Use     Smoking status: Never Smoker     Smokeless tobacco: Never Used   Substance Use Topics     Alcohol use: Yes     Comment: weekly; 4 x a week, 1-2 glass of wine     Family History   Problem Relation Age of Onset     Cancer Father         skin     Diabetes Other      Thyroid Cancer No family hx of      Thyroid Disease No family hx of          Current Outpatient Medications   Medication Sig Dispense Refill     albuterol (PROAIR HFA/PROVENTIL HFA/VENTOLIN HFA) 108 (90 Base) MCG/ACT inhaler Inhale 2 puffs into the lungs every 6 hours as needed for shortness of breath / dyspnea or wheezing 18 g 3     fluticasone-salmeterol (ADVAIR) 250-50 MCG/DOSE inhaler Inhale 1 puff into the lungs every 12 hours - fasting physical overdue; Appointment requested 60 each 0     levothyroxine (SYNTHROID/LEVOTHROID) 125 MCG tablet MON to SAT 2 tablet/day; SUN 1.5 tablet 170 tablet 4     Probiotic Product (PROBIOTIC ADVANCED PO)        No Known Allergies  Recent Labs   Lab Test 12/08/20  0849 02/04/20  1434 09/13/19  0848   *  --  138*   HDL 48  --  51   TRIG 79  --  79   ALT 40  --  43   CR 1.02  --  1.01   GFRESTIMATED 89  --  >90   GFRESTBLACK >90  --  >90   POTASSIUM 4.4  --  4.4   TSH  0.09* 0.75  --         Reviewed and updated as needed this visit by clinical staff   Tobacco  Allergies  Meds    Surg Hx             Reviewed and updated as needed this visit by Provider   Tobacco      Surg Hx            Past Medical History:   Diagnosis Date     Allergic state      Asthma age 12     Eczema      Papillary thyroid carcinoma (H) 12/9/13    right 1.5 cm, ETE, LN+, BRAF+     Postsurgical hypothyroidism 12/9/13     Thyroid nodule       Past Surgical History:   Procedure Laterality Date     ORTHOPEDIC SURGERY      shoulder labral repair     THYROIDECTOMY  12/9/2013    Procedure: THYROIDECTOMY;  Total Thyroidectomy, Central Neck Dissection ;  Surgeon: Pillo Henley MD;  Location:  OR       Review of Systems   Constitutional: Negative for chills and fever.   HENT: Negative for congestion, ear pain, hearing loss and sore throat.    Eyes: Negative for pain and visual disturbance.   Respiratory: Negative for cough and shortness of breath.    Cardiovascular: Negative for chest pain, palpitations and peripheral edema.   Gastrointestinal: Negative for abdominal pain, constipation, diarrhea, heartburn, hematochezia and nausea.   Genitourinary: Negative for dysuria, frequency, genital sores, hematuria, impotence, penile discharge and urgency.   Musculoskeletal: Negative for arthralgias, joint swelling and myalgias.   Skin: Negative for rash.   Neurological: Negative for dizziness, weakness, headaches and paresthesias.   Psychiatric/Behavioral: Negative for mood changes. The patient is not nervous/anxious.      CONSTITUTIONAL: NEGATIVE for fever, chills, change in weight  INTEGUMENTARY/SKIN: NEGATIVE for worrisome rashes, moles or lesions, history of dry skin left arm  EYES: NEGATIVE for vision changes or irritation, sees eye doctor yearly as a corrective glasses, has a history of injury to the right eye in the past  ENT: NEGATIVE for ear, mouth and throat problems  RESP: NEGATIVE for significant cough , has  "occasional shortness of breath you has stopped using the Advair recently, has not been using much his albuterol  CV: NEGATIVE for chest pain, palpitations or peripheral edema  GI: NEGATIVE for nausea, abdominal pain, heartburn, or change in bowel habits   male: negative for dysuria, hematuria, decreased urinary stream, erectile dysfunction, urethral discharge  MUSCULOSKELETAL: NEGATIVE for significant arthralgias or myalgia  NEURO: NEGATIVE for weakness, dizziness or paresthesias  ENDOCRINE: NEGATIVE for temperature intolerance, skin/hair changes, history of papillary thyroid cancer is due for follow-up with endocrinology.  HEME/ALLERGY/IMMUNE: NEGATIVE for bleeding problems  PSYCHIATRIC: NEGATIVE for changes in mood or affect    OBJECTIVE:   /79 (BP Location: Right arm, Patient Position: Sitting, Cuff Size: Adult Regular)   Pulse 64   Temp 96.9  F (36.1  C) (Temporal)   Resp 12   Ht 2.043 m (6' 8.43\")   Wt 107.5 kg (237 lb)   SpO2 98%   BMI 25.76 kg/m      Physical Exam  GENERAL: healthy, alert and no distress, tall  EYES: Eyes grossly normal to inspection, PERRL and conjunctivae and sclerae normal, wears corrective glasses  HENT: ear canals and TM's normal, nose and mouth without ulcers or lesions  NECK: no adenopathy, no asymmetry, masses, or scars and thyroid normal to palpation  RESP: lungs clear to auscultation - no rales, rhonchi or wheezes  CV: regular rate and rhythm, normal S1 S2, no S3 or S4, no murmur, click or rub, no peripheral edema and peripheral pulses strong  ABDOMEN: soft, nontender, no hepatosplenomegaly, no masses and bowel sounds normal  : Declined  MS: no gross musculoskeletal defects noted, no edema,   SKIN: no suspicious lesions or rashes, dry skin on the left proximal forearm minimal excoriations.  NEURO: Normal strength and tone, mentation intact and speech normal  PSYCH: mentation appears normal, affect normal/bright  LYMPH: no cervical, supraclavicular, axillary, or " inguinal adenopathy    Diagnostic Test Results:  Labs reviewed in Epic    ASSESSMENT/PLAN:   José was seen today for physical.    Diagnoses and all orders for this visit:    Routine history and physical examination of adult  -     Comprehensive metabolic panel (BMP + Alb, Alk Phos, ALT, AST, Total. Bili, TP)  -     CBC with platelets    Postsurgical hypothyroidism    Dyslipidemia  -     Lipid panel reflex to direct LDL Fasting    Hypocalcemia  -     Comprehensive metabolic panel (BMP + Alb, Alk Phos, ALT, AST, Total. Bili, TP)  -     Ionized Calcium    Leukopenia, unspecified type    Low vitamin D level  -     Vitamin D Deficiency    Thrombocytopenia (H)  -     CBC with platelets    Mild persistent asthma, unspecified whether complicated  -     fluticasone-salmeterol (ADVAIR) 250-50 MCG/DOSE inhaler; Inhale 1 puff into the lungs every 12 hours - fasting physical overdue; Appointment requested  -     albuterol (PROAIR HFA/PROVENTIL HFA/VENTOLIN HFA) 108 (90 Base) MCG/ACT inhaler; Inhale 2 puffs into the lungs every 6 hours as needed for shortness of breath / dyspnea or wheezing    Mild persistent asthma without complication  -     albuterol (PROAIR HFA/PROVENTIL HFA/VENTOLIN HFA) 108 (90 Base) MCG/ACT inhaler; Inhale 2 puffs into the lungs every 6 hours as needed for shortness of breath / dyspnea or wheezing  -     Adult Allergy/Asthma Referral; Future    Eczema, unspecified type  Comments:  Apply topical moisturizers.  He uses triamcinolone as needed      Advised to follow-up with endocrinology for history of papillary thyroid cancer check TSH, is on suppressive doses of levothyroxine    Low calcium twice to take calcium and vitamin D supplements daily we will recheck calcium and ionized calcium.  Refill given for his inhaler as well as Advair and the albuterol referral placed to allergy specialist.  History of low white count and platelet count on the last labs checked we will repeat CBC, keep use of alcohol to  "minimal.  Slightly elevated LDL, continue with lifestyle changes.  Last 10-year ASCVD was 1.8%    The 10-year ASCVD risk score (Norm CEBALLOS Jr., et al., 2013) is: 1.8%    Values used to calculate the score:      Age: 46 years      Sex: Male      Is Non- : No      Diabetic: No      Tobacco smoker: No      Systolic Blood Pressure: 128 mmHg      Is BP treated: No      HDL Cholesterol: 48 mg/dL      Total Cholesterol: 194 mg/dL  Is less than 10-year atherosclerotic cardiovascular disease score was completed  Patient has been advised of split billing requirements and indicates understanding: Yes    COUNSELING:   Reviewed preventive health counseling, as reflected in patient instructions       Regular exercise       Healthy diet/nutrition       Vision screening       Hearing screening       Aspirin prophylaxis        Alcohol Use        Family planning       Safe sex practices/STD prevention       Consider Hep C screening for all patients one time for ages 18-79 years       Colorectal cancer screening       Advance Care Planning    Estimated body mass index is 25.76 kg/m  as calculated from the following:    Height as of this encounter: 2.043 m (6' 8.43\").    Weight as of this encounter: 107.5 kg (237 lb).     Weight management plan: Discussed healthy diet and exercise guidelines    He reports that he has never smoked. He has never used smokeless tobacco.      Counseling Resources:  ATP IV Guidelines  Pooled Cohorts Equation Calculator  FRAX Risk Assessment  ICSI Preventive Guidelines  Dietary Guidelines for Americans, 2010  USDA's MyPlate  ASA Prophylaxis  Lung CA Screening    Camilla Mathur MD  [facility]  "

## 2022-04-15 ENCOUNTER — ANCILLARY PROCEDURE (OUTPATIENT)
Dept: ULTRASOUND IMAGING | Facility: CLINIC | Age: 47
End: 2022-04-15
Payer: COMMERCIAL

## 2022-04-15 DIAGNOSIS — C73 PAPILLARY THYROID CARCINOMA (H): ICD-10-CM

## 2022-04-15 DIAGNOSIS — E89.0 POSTSURGICAL HYPOTHYROIDISM: Chronic | ICD-10-CM

## 2022-04-15 PROCEDURE — 76536 US EXAM OF HEAD AND NECK: CPT | Performed by: RADIOLOGY

## 2022-04-15 NOTE — PROGRESS NOTES
Chart check    4/15/2022 neck US compared with 2/6/2020: neck US compared with 12/26/17  Right level 4 # 1 0.3 x 0.6 x 0.8 cm was 0.4 x 0.6 x 0.8 cm - not seen 2017 but cine not comparable.   Right level 6 # 1 not seen  -- was 0.3 x  0.3 x 0.6 cm - ? Not seen 2017

## 2022-05-29 DIAGNOSIS — E89.0 POSTSURGICAL HYPOTHYROIDISM: Chronic | ICD-10-CM

## 2022-05-29 DIAGNOSIS — C73 PAPILLARY THYROID CARCINOMA (H): ICD-10-CM

## 2022-06-02 RX ORDER — LEVOTHYROXINE SODIUM 125 UG/1
TABLET ORAL
Qty: 160 TABLET | Refills: 4 | Status: SHIPPED | OUTPATIENT
Start: 2022-06-02 | End: 2023-05-03

## 2022-06-02 NOTE — TELEPHONE ENCOUNTER
levothyroxine (SYNTHROID/LEVOTHROID) 125 MCG tablet    MON to SAT 2 tablet/day; SUN 1.5 tablet    Last Written Prescription Date:  5/11/21  Last Fill Quantity: 170,   # refills: 4  Last Office Visit : 5/11/21  Future Office visit:  6/27/22    Routing refill request to provider for review/approval because:  Provider's preference

## 2022-06-27 ENCOUNTER — TELEPHONE (OUTPATIENT)
Dept: ENDOCRINOLOGY | Facility: CLINIC | Age: 47
End: 2022-06-27

## 2022-06-27 ENCOUNTER — VIRTUAL VISIT (OUTPATIENT)
Dept: ENDOCRINOLOGY | Facility: CLINIC | Age: 47
End: 2022-06-27
Payer: COMMERCIAL

## 2022-06-27 DIAGNOSIS — E89.0 POSTSURGICAL HYPOTHYROIDISM: Primary | ICD-10-CM

## 2022-06-27 DIAGNOSIS — C73 PAPILLARY THYROID CARCINOMA (H): ICD-10-CM

## 2022-06-27 PROCEDURE — 99214 OFFICE O/P EST MOD 30 MIN: CPT | Mod: GT

## 2022-06-27 NOTE — PATIENT INSTRUCTIONS
Yearly labs are due now     See me approximately yearly    Next neck US around 2024 unless we have concern before then.

## 2022-06-27 NOTE — PROGRESS NOTES
Endocrinology video visit Note    José Augustine  is being evaluated via a billable video visit.      How would you like to obtain your AVS? EVIIVO  For the video visit, send the invitation by: Send to e-mail at: [email or web address]  Will anyone else be joining your video visit? No     Nataly Andrews MA      Attending ASSESSMENT/PLAN:     1. papillary thyroid carcinoma, 1.5 cm with extrathyroid extension, node positive, BRAF mutant positive.  He has been treated with surgery, 100 mCi 131I.    pT3, pN1a, pMx, cM0  JESUS recurrence risk intermediate  Yearly labs now   See me yearly   Neck neck US 2024 just before appt then.     Addendum  6/26/23 Tg 0.18, JESUS < 0.4, TSh 0.79, free T4 1.56- next appt 7/24/23     2.  Post surgical hypothyroidism.    Treat to TSH < 0.4. .   He is currently using 13.5 of the 125 mcg tablets/week     Due to the COVID 19 pandemic this visit was a video visit. The patient gave verbal consent for the visit today.    I have independently reviewed and interpreted labs, imaging as indicated.     Chart review/prep time 1  0746-2128  Visit Start time amwel 1457   Visit Stop time  1501   16__ minutes spent on the date of the encounter doing chart review, history and exam, documentation and further activities as noted above.    Nettie Carolina MD      Cc/ HISTORY OF PRESENT ILLNESS José presents for follow up of + BRAF papillary thyroid cancer, post surgical hypothyroidism.  He had + BRAF mutation on the FNAB specimen. He was last seen by me 5/021.  At that time he was on  13.5 tablets/week, using the 125 mcg tablets (241 mcg/day average).  He continues on this dose.  He had US in 4/2022 but he last had labs in 12/2020.  He thought the 4/2022 labs included the thyroid, but they didn't.      The  thyroid cancer treatment course has been as follows:   12/9/13 Total thyroidectomy, central neck dissection removing right PCT 1.5 cm, extending to extrathyroidal fatty tissue (foca ETE).  6/10 level 6 LN  were positive for PCT.   MACIS:Total 4.55 if invasion and no distant  pT3, pN1a, pMx  8/13/14 131I 100 mCi after 2 dose Thyrogen stimulation.    11/12/15 thyrogen stimulated 3 mCi 123I TBS  negative. Day 5 thyrogen stimulated Tg was  048 with TSH 16.4.       We have the following relevant lab data:  6/30/11 TSh 0.99  10/3/12: vitamin D 16.4  9/4/13: TSh 1.07, calcium 9.1, BUN17, creatinin0.85, alk phos 54, vitamin D 41.7  1/21/14: Tg 0.48, JESUS < 0.4, TSH 9.77  5/20/14: Tg 0.31, JESUS < 0.4, TSH1.02  8/13/14: Tg 0.89, JESUS < 0.4,  -  8/18/14 post therapy scan:  neck uptake  10/14/14: Tg 0.14, JESUS < 0.4, TSH 1.19  12/19/14 TSH 0.3, free T4 1.39  4/14/15: Tg 0.17, JESUS < 0.4, TSH 0.07  11/13/15: Tg 0.48, JESUS < 0.4, TSH 16,40  4/18/16: Tg 0.14, JESUS < 0.4,  12/20/17: Tg 0.16, JESUS < 0.4, TSH 0.02, free T4 1.57-   2/4/19: Tg 0.14, JESUS < 0.4, TSH 1.4, free T4 1.02 -   2/4/2020 Tg 0.28, JESUS < 0.4- TSH 0.75, free T4 1.14  12/8/2020: Tg 0.12, JESUS < 0.4, TSH 0.09, free T4 1.22, Ca 8.4, creatinine 1.02.      2/6/2020: neck US compared with 12/26/17  Right level 4 # 1 0.4 x 0.6 x 0.8 cm - not seen 2017 but cine not comparable.   Right level 6 # 1 0.3 x  0.3 x 0.6 cm - ? Not seen 2017  4/15/2022 neck US compared with 2/6/2020: neck US compared with 12/26/17  Right level 4 # 1 0.3 x 0.6 x 0.8 cm was 0.4 x 0.6 x 0.8 cm - not seen 2017 but cine not comparable.   Right level 6 # 1 not seen  -- was 0.3 x  0.3 x 0.6 cm - ? Not seen 2017    REVIEW OF SYSTEMS  Energy normal  Sleep OK  Weight stable or up a little - ? 235#  Cardiac negative   Respiratory  Negative ; some asthma symptoms   GI: negative   Had a bout of Lyme disease - July 2021.  It has been treated and and resolved.     Past Medical History  Past Medical History:   Diagnosis Date     Allergic state      Asthma age 12     Eczema      Papillary thyroid carcinoma (H) 12/9/13    right 1.5 cm, ETE, LN+, BRAF+     Postsurgical hypothyroidism 12/9/13     Thyroid nodule         Past Surgical History:   Procedure Laterality Date     ORTHOPEDIC SURGERY      shoulder labral repair     THYROIDECTOMY  12/9/2013    Procedure: THYROIDECTOMY;  Total Thyroidectomy, Central Neck Dissection ;  Surgeon: Pillo Henley MD;  Location:  OR       Medications  Current Outpatient Medications   Medication Sig Dispense Refill     albuterol (PROAIR HFA/PROVENTIL HFA/VENTOLIN HFA) 108 (90 Base) MCG/ACT inhaler Inhale 2 puffs into the lungs every 6 hours as needed for shortness of breath / dyspnea or wheezing 18 g 3     fluticasone-salmeterol (ADVAIR) 250-50 MCG/DOSE inhaler Inhale 1 puff into the lungs every 12 hours - fasting physical overdue; Appointment requested 60 each 0     levothyroxine (SYNTHROID/LEVOTHROID) 125 MCG tablet TAKE 2 TABS BY MOUTH DAILY ON MON TO SAT AND 1.5 TABS BY MOUTH DAILY ON  tablet 4     Probiotic Product (PROBIOTIC ADVANCED PO)        Supplements:   Vitamin D 2000 international unit(s)/day -    Allergies  No Known Allergies    Family History  Family History   Problem Relation Age of Onset     Cancer Father         skin     Diabetes Other      Thyroid Cancer No family hx of      Thyroid Disease No family hx of      Social History  Social History     Tobacco Use     Smoking status: Never Smoker     Smokeless tobacco: Never Used   Substance Use Topics     Alcohol use: Yes     Comment: weekly; 4 x a week, 1-2 glass of wine     Drug use: No      works from home .       Physical Exam  There were no vitals taken for this visit.  There is no height or weight on file to calculate BMI.   BP Readings from Last 1 Encounters:   04/13/22 128/79      Pulse Readings from Last 1 Encounters:   04/13/22 64      Resp Readings from Last 1 Encounters:   04/13/22 12      Temp Readings from Last 1 Encounters:   04/13/22 96.9  F (36.1  C) (Temporal)      SpO2 Readings from Last 1 Encounters:   04/13/22 98%      Wt Readings from Last 1 Encounters:   04/13/22 107.5 kg (237 lb)      Ht Readings  "from Last 1 Encounters:   04/13/22 2.043 m (6' 8.43\")     GENERAL: pleasant young man in  no distress, sitting at desk  SKIN: Visible skin clear  EYES: Eyes grossly normal to inspection.    NECK: no visible masses;   RESP: No audible wheeze, cough.  No visible retractions or increased work of breathing.    NEURO: Awake, alert, responds appropriately to questions.  Mentation and speech fluent.  PSYCH:affect normal, and appearance well-groomed.      DATA REVIEW    ENDO THYROID LABS-[facility] Latest Ref Rng & Units 12/8/2020 2/4/2020   TSH 0.40 - 4.00 mU/L 0.09 (L) 0.75   FREE T4 0.76 - 1.46 ng/dL 1.22 1.14     ENDO THYROID LABS-[facility] Latest Ref Rng & Units 5/10/2019   TSH 0.40 - 4.00 mU/L 2.25   FREE T4 0.76 - 1.46 ng/dL 1.12       EXAMINATION: US HEAD NECK SOFT TISSUE, 4/15/2022 2:48 PM      COMPARISON: Ultrasound 2/6/2020     HISTORY: Papillary thyroid carcinoma.     TECHNIQUE: Grayscale and color ultrasound imaging of the neck was  performed.     FINDINGS:     Lymph nodes are measured bilaterally with measurements given in  craniocaudal, transverse and AP dimensions as follows:     Right:  Level 2:   1. 1.5 x 1.0 x 0.6 cm lymph node with fatty hilum.  2. 2.0 x 1.2 x 0.5 cm lymph node with fatty hilum.  3. 2.8 x 1.2 x 0.5 cm lymph node with fatty hilum, not previously  imaged.  Level 4: 0.8 x 0.6 x 0.3 cm lymph node with fatty hilum.     Left:  Level 2:   1. 1.5 x 0.8 x 0.6 cm lymph node with fatty hilum.  2. 2.5 x 1.5 x 0.5 cm lymph node with fatty hilum.                                                                      IMPRESSION:  Bilateral cervical chain lymph nodes as described above without  suspicious features. Most are not significantly changed from 2/6/2020.     I have personally reviewed the examination and initial interpretation  and I agree with the findings.     KARLOS PATTEN, DO        "

## 2022-06-27 NOTE — LETTER
6/27/2022       RE: José Augustine  [address] [facility] [id number]     Dear Colleague,    Thank you for referring your patient, José Augustine, to the [facility] ENDOCRINOLOGY [facility]INIC [place] at [facility]. Please see a copy of my visit note below.    Endocrinology video visit Note    José Augustine  is being evaluated via a billable video visit.      How would you like to obtain your AVS? [email or web address]  For the video visit, send the invitation by: Send to e-mail at: [email or web address]  Will anyone else be joining your video visit? No     Nataly Andrews MA      Attending ASSESSMENT/PLAN:     1. papillary thyroid carcinoma, 1.5 cm with extrathyroid extension, node positive, BRAF mutant positive.  He has been treated with surgery, 100 mCi 131I.    pT3, pN1a, pMx, cM0  JESUS recurrence risk intermediate  Yearly labs now   See me yearly   Neck neck US 2024 just before appt then.      2.  Post surgical hypothyroidism.    Treat to TSH < 0.4. .   He is currently using 13.5 of the 125 mcg tablets/week     Due to the COVID 19 pandemic this visit was a video visit. The patient gave verbal consent for the visit today.    I have independently reviewed and interpreted labs, imaging as indicated.     Chart review/prep time 1  [id number]  Visit Start time amwel 1457   Visit Stop time  1501   16__ minutes spent on the date of the encounter doing chart review, history and exam, documentation and further activities as noted above.    Nettie Carolina MD      Cc/ HISTORY OF PRESENT ILLNESS José presents for follow up of + BRAF papillary thyroid cancer, post surgical hypothyroidism.  He had + BRAF mutation on the FNAB specimen. He was last seen by me 5/021.  At that time he was on  13.5 tablets/week, using the 125 mcg tablets (241 mcg/day average).  He continues on this dose.  He had US in 4/2022 but he last had labs in 12/2020.  He thought the 4/2022 labs included the  thyroid, but they didn't.      The  thyroid cancer treatment course has been as follows:   12/9/13 Total thyroidectomy, central neck dissection removing right PCT 1.5 cm, extending to extrathyroidal fatty tissue (foca ETE).  6/10 level 6 LN were positive for PCT.   MACIS:Total 4.55 if invasion and no distant  pT3, pN1a, pMx  8/13/14 131I 100 mCi after 2 dose Thyrogen stimulation.    11/12/15 thyrogen stimulated 3 mCi 123I TBS  negative. Day 5 thyrogen stimulated Tg was  048 with TSH 16.4.       We have the following relevant lab data:  6/30/11 TSh 0.99  10/3/12: vitamin D 16.4  9/4/13: TSh 1.07, calcium 9.1, BUN17, creatinin0.85, alk phos 54, vitamin D 41.7  1/21/14: Tg 0.48, JESUS < 0.4, TSH 9.77  5/20/14: Tg 0.31, JESUS < 0.4, TSH1.02  8/13/14: Tg 0.89, JESUS < 0.4,  -  8/18/14 post therapy scan:  neck uptake  10/14/14: Tg 0.14, JESUS < 0.4, TSH 1.19  12/19/14 TSH 0.3, free T4 1.39  4/14/15: Tg 0.17, JESUS < 0.4, TSH 0.07  11/13/15: Tg 0.48, JESUS < 0.4, TSH 16,40  4/18/16: Tg 0.14, JESUS < 0.4,  12/20/17: Tg 0.16, JESUS < 0.4, TSH 0.02, free T4 1.57-   2/4/19: Tg 0.14, JESUS < 0.4, TSH 1.4, free T4 1.02 -   2/4/2020 Tg 0.28, JESUS < 0.4- TSH 0.75, free T4 1.14  12/8/2020: Tg 0.12, JESUS < 0.4, TSH 0.09, free T4 1.22, Ca 8.4, creatinine 1.02.      2/6/2020: neck US compared with 12/26/17  Right level 4 # 1 0.4 x 0.6 x 0.8 cm - not seen 2017 but cine not comparable.   Right level 6 # 1 0.3 x  0.3 x 0.6 cm - ? Not seen 2017  4/15/2022 neck US compared with 2/6/2020: neck US compared with 12/26/17  Right level 4 # 1 0.3 x 0.6 x 0.8 cm was 0.4 x 0.6 x 0.8 cm - not seen 2017 but cine not comparable.   Right level 6 # 1 not seen  -- was 0.3 x  0.3 x 0.6 cm - ? Not seen 2017    REVIEW OF SYSTEMS  Energy normal  Sleep OK  Weight stable or up a little - ? 235#  Cardiac negative   Respiratory  Negative ; some asthma symptoms   GI: negative   Had a bout of Lyme disease - July 2021.  It has been treated and and resolved.     Past Medical  History  Past Medical History:   Diagnosis Date     Allergic state      Asthma age 12     Eczema      Papillary thyroid carcinoma (H) 12/9/13    right 1.5 cm, ETE, LN+, BRAF+     Postsurgical hypothyroidism 12/9/13     Thyroid nodule        Past Surgical History:   Procedure Laterality Date     ORTHOPEDIC SURGERY      shoulder labral repair     THYROIDECTOMY  12/9/2013    Procedure: THYROIDECTOMY;  Total Thyroidectomy, Central Neck Dissection ;  Surgeon: Pillo Henley MD;  Location:  OR       Medications  Current Outpatient Medications   Medication Sig Dispense Refill     albuterol (PROAIR HFA/PROVENTIL HFA/VENTOLIN HFA) 108 (90 Base) MCG/ACT inhaler Inhale 2 puffs into the lungs every 6 hours as needed for shortness of breath / dyspnea or wheezing 18 g 3     fluticasone-salmeterol (ADVAIR) 250-50 MCG/DOSE inhaler Inhale 1 puff into the lungs every 12 hours - fasting physical overdue; Appointment requested 60 each 0     levothyroxine (SYNTHROID/LEVOTHROID) 125 MCG tablet TAKE 2 TABS BY MOUTH DAILY ON MON TO SAT AND 1.5 TABS BY MOUTH DAILY ON  tablet 4     Probiotic Product (PROBIOTIC ADVANCED PO)        Supplements:   Vitamin D 2000 international unit(s)/day -    Allergies  No Known Allergies    Family History  Family History   Problem Relation Age of Onset     Cancer Father         skin     Diabetes Other      Thyroid Cancer No family hx of      Thyroid Disease No family hx of      Social History  Social History     Tobacco Use     Smoking status: Never Smoker     Smokeless tobacco: Never Used   Substance Use Topics     Alcohol use: Yes     Comment: weekly; 4 x a week, 1-2 glass of wine     Drug use: No      works from home .       Physical Exam  There were no vitals taken for this visit.  There is no height or weight on file to calculate BMI.   BP Readings from Last 1 Encounters:   04/13/22 128/79      Pulse Readings from Last 1 Encounters:   04/13/22 64      Resp Readings from Last 1 Encounters:  "  04/13/22 12      Temp Readings from Last 1 Encounters:   04/13/22 96.9  F (36.1  C) (Temporal)      SpO2 Readings from Last 1 Encounters:   04/13/22 98%      Wt Readings from Last 1 Encounters:   04/13/22 107.5 kg (237 lb)      Ht Readings from Last 1 Encounters:   04/13/22 2.043 m (6' 8.43\")     GENERAL: pleasant young man in  no distress, sitting at desk  SKIN: Visible skin clear  EYES: Eyes grossly normal to inspection.    NECK: no visible masses;   RESP: No audible wheeze, cough.  No visible retractions or increased work of breathing.    NEURO: Awake, alert, responds appropriately to questions.  Mentation and speech fluent.  PSYCH:affect normal, and appearance well-groomed.      DATA REVIEW    ENDO THYROID LABS-[facility] Latest Ref Rng & Units 12/8/2020 2/4/2020   TSH 0.40 - 4.00 mU/L 0.09 (L) 0.75   FREE T4 0.76 - 1.46 ng/dL 1.22 1.14     ENDO THYROID LABS-[facility] Latest Ref Rng & Units 5/10/2019   TSH 0.40 - 4.00 mU/L 2.25   FREE T4 0.76 - 1.46 ng/dL 1.12       EXAMINATION: US HEAD NECK SOFT TISSUE, 4/15/2022 2:48 PM      COMPARISON: Ultrasound 2/6/2020     HISTORY: Papillary thyroid carcinoma.     TECHNIQUE: Grayscale and color ultrasound imaging of the neck was  performed.     FINDINGS:     Lymph nodes are measured bilaterally with measurements given in  craniocaudal, transverse and AP dimensions as follows:     Right:  Level 2:   1. 1.5 x 1.0 x 0.6 cm lymph node with fatty hilum.  2. 2.0 x 1.2 x 0.5 cm lymph node with fatty hilum.  3. 2.8 x 1.2 x 0.5 cm lymph node with fatty hilum, not previously  imaged.  Level 4: 0.8 x 0.6 x 0.3 cm lymph node with fatty hilum.     Left:  Level 2:   1. 1.5 x 0.8 x 0.6 cm lymph node with fatty hilum.  2. 2.5 x 1.5 x 0.5 cm lymph node with fatty hilum.                                                                      IMPRESSION:  Bilateral cervical chain lymph nodes as described above without  suspicious features. Most are not significantly changed from 2/6/2020.     I have " personally reviewed the examination and initial interpretation  and I agree with the findings.     KARLOS PATTEN, DO

## 2022-06-29 ENCOUNTER — LAB (OUTPATIENT)
Dept: LAB | Facility: CLINIC | Age: 47
End: 2022-06-29
Payer: COMMERCIAL

## 2022-06-29 DIAGNOSIS — C73 PAPILLARY THYROID CARCINOMA (H): ICD-10-CM

## 2022-06-29 DIAGNOSIS — M62.81 GENERALIZED MUSCLE WEAKNESS: ICD-10-CM

## 2022-06-29 DIAGNOSIS — E89.0 POSTSURGICAL HYPOTHYROIDISM: ICD-10-CM

## 2022-06-29 PROCEDURE — 80053 COMPREHEN METABOLIC PANEL: CPT

## 2022-06-29 PROCEDURE — 84443 ASSAY THYROID STIM HORMONE: CPT

## 2022-06-29 PROCEDURE — 84432 ASSAY OF THYROGLOBULIN: CPT | Mod: 90

## 2022-06-29 PROCEDURE — 36415 COLL VENOUS BLD VENIPUNCTURE: CPT

## 2022-06-29 PROCEDURE — 84439 ASSAY OF FREE THYROXINE: CPT

## 2022-06-29 PROCEDURE — 99000 SPECIMEN HANDLING OFFICE-LAB: CPT

## 2022-06-29 PROCEDURE — 86800 THYROGLOBULIN ANTIBODY: CPT | Mod: 90

## 2022-06-30 ENCOUNTER — OFFICE VISIT (OUTPATIENT)
Dept: FAMILY MEDICINE | Facility: CLINIC | Age: 47
End: 2022-06-30
Payer: COMMERCIAL

## 2022-06-30 VITALS
BODY MASS INDEX: 25.86 KG/M2 | WEIGHT: 238 LBS | HEART RATE: 70 BPM | DIASTOLIC BLOOD PRESSURE: 83 MMHG | TEMPERATURE: 97.3 F | RESPIRATION RATE: 16 BRPM | OXYGEN SATURATION: 98 % | SYSTOLIC BLOOD PRESSURE: 125 MMHG

## 2022-06-30 DIAGNOSIS — M62.81 GENERALIZED MUSCLE WEAKNESS: ICD-10-CM

## 2022-06-30 DIAGNOSIS — R06.00 DYSPNEA, UNSPECIFIED TYPE: Primary | ICD-10-CM

## 2022-06-30 PROCEDURE — 99214 OFFICE O/P EST MOD 30 MIN: CPT | Performed by: FAMILY MEDICINE

## 2022-06-30 PROCEDURE — 82607 VITAMIN B-12: CPT | Performed by: FAMILY MEDICINE

## 2022-06-30 PROCEDURE — 83550 IRON BINDING TEST: CPT | Performed by: FAMILY MEDICINE

## 2022-06-30 PROCEDURE — 36415 COLL VENOUS BLD VENIPUNCTURE: CPT | Performed by: FAMILY MEDICINE

## 2022-06-30 PROCEDURE — 82728 ASSAY OF FERRITIN: CPT | Performed by: FAMILY MEDICINE

## 2022-06-30 ASSESSMENT — ASTHMA QUESTIONNAIRES: ACT_TOTALSCORE: 18

## 2022-06-30 NOTE — PROGRESS NOTES
Assessment & Plan     Dyspnea, unspecified type  Concern for dyspnea.  CXR clear  sats are normal  Albuterol does not seem to help  Will await labs and if this is not indicative consider steroid burst and possible further evaluation  ? that he has possible neuromuscular disease      - CBC with platelets and differential; Future  - XR Chest 2 Views; Future    Generalized muscle weakness    - Comprehensive metabolic panel (BMP + Alb, Alk Phos, ALT, AST, Total. Bili, TP); Future  - Vitamin B12; Future  - Iron and iron binding capacity; Future  - Ferritin; Future  - Iron and iron binding capacity  - Ferritin  - Vitamin B12    Prescription drug management  20 minutes spent on the date of the encounter doing chart review, history and exam, documentation and further activities per the note        See Patient Instructions    No follow-ups on file.    Angela Allen MD  [facility]    Yuliana Kumar is a 47 year old, presenting for the following health issues:  Asthma      History of Present Illness       Reason for visit:  Shortness of breath and fatigue  Symptom onset:  1-3 days ago    He eats 4 or more servings of fruits and vegetables daily.He consumes 0 sweetened beverage(s) daily.He exercises with enough effort to increase his heart rate 9 or less minutes per day.  He exercises with enough effort to increase his heart rate 3 or less days per week.   He is taking medications regularly.     Started to have SOB in last 3 days  Feeling poorly in the past 3 weeks  Has hard time getting in a full breath  Has had to stop working  Asthma issues for 3 days  Has had this since 10  Triggers are typically seasonal allergies in fall  Spring is usually not bad  Thought he had covid  Was feeling fatigued  Tested neg yest pcr  Has not noted a lot of sinus drainage  Has used adviar as controller  Stopped this in march and felt fine until April - restarted this in late April    No weight loss  No  fevers    Had lyme disease last summer was treated        Review of Systems   Constitutional, HEENT, cardiovascular, pulmonary, gi and gu systems are negative, except as otherwise noted.      Objective    /83   Pulse 70   Temp 97.3  F (36.3  C) (Tympanic)   Resp 16   Wt 108 kg (238 lb)   SpO2 98%   BMI 25.86 kg/m    Body mass index is 25.86 kg/m .  Physical Exam   GENERAL: healthy, alert and no distress  NECK: no adenopathy, no asymmetry, masses, or scars and thyroid normal to palpation  RESP: weak inspiration - hard to take in deep breath  CV: regular rate and rhythm, normal S1 S2, no S3 or S4, no murmur, click or rub, no peripheral edema and peripheral pulses strong  MS: no gross musculoskeletal defects noted, no edema  NEURO: Normal strength and tone, mentation intact and speech normal    Awaiting labs                 .  ..

## 2022-06-30 NOTE — NURSING NOTE
"Chief Complaint   Patient presents with     Asthma     initial /83   Pulse 70   Temp 97.3  F (36.3  C) (Tympanic)   Resp 16   Wt 108 kg (238 lb)   SpO2 98%   BMI 25.86 kg/m   Estimated body mass index is 25.86 kg/m  as calculated from the following:    Height as of 4/13/22: 2.043 m (6' 8.43\").    Weight as of this encounter: 108 kg (238 lb).  BP completed using cuff size: large.   R arm      Health Maintenance that is potentially due pending provider review:  NONE    n/a    Jose Jones ma  "

## 2022-07-01 LAB
ALBUMIN SERPL-MCNC: 4.4 G/DL (ref 3.4–5)
ALP SERPL-CCNC: 49 U/L (ref 40–150)
ALT SERPL W P-5'-P-CCNC: 32 U/L (ref 0–70)
ANION GAP SERPL CALCULATED.3IONS-SCNC: 8 MMOL/L (ref 3–14)
AST SERPL W P-5'-P-CCNC: 26 U/L (ref 0–45)
BILIRUB SERPL-MCNC: 0.5 MG/DL (ref 0.2–1.3)
BUN SERPL-MCNC: 12 MG/DL (ref 7–30)
CALCIUM SERPL-MCNC: 8.5 MG/DL (ref 8.5–10.1)
CHLORIDE BLD-SCNC: 110 MMOL/L (ref 94–109)
CO2 SERPL-SCNC: 25 MMOL/L (ref 20–32)
CREAT SERPL-MCNC: 0.98 MG/DL (ref 0.66–1.25)
GFR SERPL CREATININE-BSD FRML MDRD: >90 ML/MIN/1.73M2
GLUCOSE BLD-MCNC: 117 MG/DL (ref 70–99)
POTASSIUM BLD-SCNC: 3.9 MMOL/L (ref 3.4–5.3)
PROT SERPL-MCNC: 6.9 G/DL (ref 6.8–8.8)
SODIUM SERPL-SCNC: 143 MMOL/L (ref 133–144)
T4 FREE SERPL-MCNC: 1.27 NG/DL (ref 0.76–1.46)
TSH SERPL DL<=0.005 MIU/L-ACNC: 0.19 MU/L (ref 0.4–4)
VIT B12 SERPL-MCNC: 306 PG/ML (ref 232–1245)

## 2022-07-02 LAB
FERRITIN SERPL-MCNC: 60 NG/ML (ref 26–388)
IRON SATN MFR SERPL: 32 % (ref 15–46)
IRON SERPL-MCNC: 97 UG/DL (ref 35–180)
TIBC SERPL-MCNC: 302 UG/DL (ref 240–430)

## 2022-07-06 NOTE — RESULT ENCOUNTER NOTE
Hello,    Luanne comprehensive panel looked normal  The thyroid levels were released to Dr. BENNETT and they look normal.      Angela lAlen MD

## 2022-07-07 LAB — SCANNED LAB RESULT: NORMAL

## 2022-07-07 NOTE — RESULT ENCOUNTER NOTE
Hello,    Great news, your results were normal.  Normal ferritin which is your stored iron, normal iron and iron binding capacity which is the iron in your blood and normal vitamin B12.  It is at the low end of normal so you could try taking a B12 supplement for 2 months to see if this helps with energy.    Angela Allen MD

## 2022-07-13 ENCOUNTER — OFFICE VISIT (OUTPATIENT)
Dept: URGENT CARE | Facility: URGENT CARE | Age: 47
End: 2022-07-13
Payer: COMMERCIAL

## 2022-07-13 VITALS
SYSTOLIC BLOOD PRESSURE: 140 MMHG | WEIGHT: 238 LBS | TEMPERATURE: 96.8 F | RESPIRATION RATE: 18 BRPM | DIASTOLIC BLOOD PRESSURE: 90 MMHG | OXYGEN SATURATION: 100 % | BODY MASS INDEX: 25.86 KG/M2 | HEART RATE: 70 BPM

## 2022-07-13 DIAGNOSIS — R07.89 ATYPICAL CHEST PAIN: Primary | ICD-10-CM

## 2022-07-13 LAB
BASOPHILS # BLD AUTO: 0 10E3/UL (ref 0–0.2)
BASOPHILS NFR BLD AUTO: 0 %
D DIMER PPP FEU-MCNC: 0.44 UG/ML FEU (ref 0–0.5)
EOSINOPHIL # BLD AUTO: 0.1 10E3/UL (ref 0–0.7)
EOSINOPHIL NFR BLD AUTO: 1 %
ERYTHROCYTE [DISTWIDTH] IN BLOOD BY AUTOMATED COUNT: 12.5 % (ref 10–15)
HCT VFR BLD AUTO: 45.4 % (ref 40–53)
HGB BLD-MCNC: 15.1 G/DL (ref 13.3–17.7)
LYMPHOCYTES # BLD AUTO: 1.7 10E3/UL (ref 0.8–5.3)
LYMPHOCYTES NFR BLD AUTO: 26 %
MCH RBC QN AUTO: 30.4 PG (ref 26.5–33)
MCHC RBC AUTO-ENTMCNC: 33.3 G/DL (ref 31.5–36.5)
MCV RBC AUTO: 91 FL (ref 78–100)
MONOCYTES # BLD AUTO: 0.5 10E3/UL (ref 0–1.3)
MONOCYTES NFR BLD AUTO: 7 %
NEUTROPHILS # BLD AUTO: 4.3 10E3/UL (ref 1.6–8.3)
NEUTROPHILS NFR BLD AUTO: 66 %
PLATELET # BLD AUTO: 194 10E3/UL (ref 150–450)
RBC # BLD AUTO: 4.97 10E6/UL (ref 4.4–5.9)
WBC # BLD AUTO: 6.5 10E3/UL (ref 4–11)

## 2022-07-13 PROCEDURE — 85025 COMPLETE CBC W/AUTO DIFF WBC: CPT | Performed by: PHYSICIAN ASSISTANT

## 2022-07-13 PROCEDURE — 93000 ELECTROCARDIOGRAM COMPLETE: CPT | Performed by: PHYSICIAN ASSISTANT

## 2022-07-13 PROCEDURE — U0005 INFEC AGEN DETEC AMPLI PROBE: HCPCS | Performed by: PHYSICIAN ASSISTANT

## 2022-07-13 PROCEDURE — 85379 FIBRIN DEGRADATION QUANT: CPT | Performed by: PHYSICIAN ASSISTANT

## 2022-07-13 PROCEDURE — 36415 COLL VENOUS BLD VENIPUNCTURE: CPT | Performed by: PHYSICIAN ASSISTANT

## 2022-07-13 PROCEDURE — U0003 INFECTIOUS AGENT DETECTION BY NUCLEIC ACID (DNA OR RNA); SEVERE ACUTE RESPIRATORY SYNDROME CORONAVIRUS 2 (SARS-COV-2) (CORONAVIRUS DISEASE [COVID-19]), AMPLIFIED PROBE TECHNIQUE, MAKING USE OF HIGH THROUGHPUT TECHNOLOGIES AS DESCRIBED BY CMS-2020-01-R: HCPCS | Performed by: PHYSICIAN ASSISTANT

## 2022-07-13 PROCEDURE — 99214 OFFICE O/P EST MOD 30 MIN: CPT | Mod: CS | Performed by: PHYSICIAN ASSISTANT

## 2022-07-13 NOTE — PATIENT INSTRUCTIONS
Follow up immediately with severe headache, chest pain, or shortness of breath    Rest, isolate for results, hydrate, follow up if worsening or new symptoms  Unvaccinated household members to isolate until test results, if positive isolate for 2 weeks and follow up for testing if symptoms occur         Patient Education     Coronavirus Disease 2019 (COVID-19): Caring for Yourself or Others   If you or a household member have symptoms of COVID-19, follow the guidelines below. This will help you manage symptoms and keep the virus from spreading.  If you have symptoms of COVID-19  Stay home and contact your care team. They will tell you what to do.  Don t panic. Keep in mind that other illnesses can cause similar symptoms.  Stay away from work, school, and public places.  Limit physical contact with others in your home. Limit visitors. No kissing.  Clean surfaces you touch with disinfectant.  If you need to cough or sneeze, do it into a tissue. Then throw the tissue into the trash. If you don't have tissues, cough or sneeze into the bend of your elbow.  Don t share food or personal items with people in your household. This includes items like eating and drinking utensils, towels, and bedding.  Wear a cloth face mask around other people. During a public health emergency, medical face masks may be reserved for healthcare workers. You may need to make a cloth face mask of your own. You can do this using a bandana, T-shirt, or other cloth. The CDC has instructions on how to make a face mask. Wear the mask so that it covers both your nose and mouth.  If you need to go to a hospital or clinic, call ahead to let them know. Expect the care team to wear masks, gowns, gloves, and eye protection. You may need to come to a different entrance or wait in a separate room.  Follow all instructions from your care team.    If you ve been diagnosed with COVID-19  Stay home and away from others, including other people in your home. (This  is called self-isolation.) Don t leave home unless you need to get medical care. Don t go to work, school, or public places. Don t use buses, taxis, or other public transportation.  Follow all instructions from your care team.  If you need to go to a hospital or clinic, call ahead to let them know. Expect the care team to wear masks, gowns, gloves, and eye protection. You may need to come to a different entrance or wait in a separate room.  Wear a face mask over your nose and mouth. This is to protect others from your germs. If you can t wear a mask, your caregivers should wear one. You may need to make your own mask using a bandana, T-shirt, or other cloth. See the CDC s instructions on how to make a face mask.  Have no contact with pets and other animals.  Don t share food or personal items with people in your household. This includes items like eating and drinking utensils, towels, and bedding.  If you need to cough or sneeze, do it into a tissue. Then throw the tissue into the trash. If you don't have tissues, cough or sneeze into the bend of your elbow.  Wash your hands often.    Self-care at home   At this time, there is no medicine approved to prevent or treat COVID-19. The FDA has approved an antiviral medicine (called remdesivir) for people being treated in the hospital. This is for people 12 years and older who weigh more than about 88 pounds (40 kgs). In certain cases, it may also be used for people younger than 12 years or who weigh less than about 88 pounds (40 kgs)..  Currently, treatment is mostly aimed at helping your body while it fights the virus.  Getting extra rest.  Drink extra fluids 6 to 8 glasses of liquids each day), unless a doctor has told you not to. Ask your care team which fluids are best for you. Avoid fluids that contain caffeine or alcohol.  Taking over-the-counter (OTC) medicine to reduce pain and fever. Your care team will tell you which medicine to use.  If you ve been in the  hospital for COVID-19, follow your care team s instructions. They will tell you when to stop self-isolation. They may also have you change positions to help your breathing (such as lying on your belly).  If a test showed that you have COVID-19, you may be asked to donate plasma after you ve recovered. (This is called COVID-19 convalescent plasma donation.) The plasma may contain antibodies to help fight the virus in others. Experts don't know if the plasma will work well as a treatment. Research continues, and the FDA has approved it for emergency use in certain people with serious or life-threatening COVID-19. For more information, talk to your care team.  Caring for a sick person   Follow all instructions from the care team.  Wash your hands often.  Wear protective clothing as advised.  Make sure the sick person wears a mask. If they can't wear a mask, don t stay in the same room with them. If you must be in the same room, wear a face mask. Make sure the mask covers both the nose and mouth.  Keep track of the sick person s symptoms.  Clean surfaces often with disinfectant. This includes phones, kitchen counters, fridge door handles, bathroom surfaces, and others.  Don t let anyone share household items with the sick person. This includes eating and drinking tools, towels, sheets, or blankets.  Clean fabrics and laundry well.  Keep other people and pets away from the sick person.    When you can stop self-isolation  When you are sick with COVID-19, you should stay away from other people. This is called self-isolation. The rules for ending self-isolation depend on your health in general.  If you are normally healthy:  You can stop self-isolation when all 3 of these are true:  You ve had no fever--and no medicine that reduces fever--for at least 24 hours. And   Your symptoms are better, such as cough or trouble breathing. And   At least 10 days have passed since your symptoms first started.  Talk with your care team  before you leave home. They may tell you it s okay to leave, or they may give you different advice. You do not need to be re-tested.  If you have a weak immune system, or you ve had severe COVID-19:  Follow your care team s instructions. You may be asked to self-isolate for 10 days to 20 days after your symptoms first started. Your care team may want to re-test you for COVID-19.  Note: If you re being treated for cancer, have an immune disorder (such as HIV), or have had a transplant (organ or bone marrow), you may have a weak immune system.  If you've already had COVID-19 once:  If you had the virus over 3 months ago, and you ve been exposed again, treat it like you've never had COVID-19. Stay home, limit your contact with others, call your care team, and watch for symptoms.  If it s been less than 3 months since you had the virus, you re symptom-free, and you ve been exposed again: You don t need to self-isolate or be re-tested. But if you develop new symptoms that can t be linked to another illness, please self-isolate and contact your care team.  When you return to public settings  When you are well enough to go outside your home, follow the CDC s guidance on cloth face masks.  Anyone over age 2 should wear a face mask in public, especially when it's hard to socially distance. This includes public transit, public protests and marches, and crowded stores, bars, and restaurants.  Face masks are most likely to reduce the spread of COVID-19 when they are widely used by people who are out in the public.  Certain people should not wear a face covering. These include:  Children under 2 years old  Anyone with a health, developmental, or mental health condition that can be made worse by wearing a mask  Anyone who is unconscious or unable to remove the face covering without help. See the CDC's guidance on who should not wear a face mask.  When to call your care team  Call your care team right away if a sick person has any  of these:  Trouble breathing  Pain or pressure in chest  If a sick person has any of these, call 911:  Trouble breathing that gets worse  Pain or pressure in chest that gets worse  Blue tint to lips or face  Fast or irregular heartbeat  Confusion or trouble waking  Fainting or loss of consciousness  Coughing up blood  Going home from the hospital   If you have COVID-19 and were recently in the hospital:  Follow the instructions above for self-care and isolation.  Follow the hospital care team s instructions.  Ask questions if anything is unclear to you. Write down answers so you remember them.  Date last modified: 11/23/2020  [employer] last reviewed this educational content on 4/1/2020  This information has been modified by your health care provider with permission from the publisher.    8176-1287 [employer]. [address] [place] [address], [place] [address]. All rights reserved. This information is not intended as a substitute for professional medical care. Always follow your healthcare professional's instructions.

## 2022-07-14 LAB — SARS-COV-2 RNA RESP QL NAA+PROBE: NEGATIVE

## 2022-07-31 NOTE — PROGRESS NOTES
SUBJECTIVE:   José Augustine is a 47 year old male presenting with a chief complaint of Shortness of breath (hard to take a full breath) X 2 weeks. Within the last couple of days he has felt worse-brain fog/confusion, tingling in hands and lips the past 2 hours. Pt states he experienced a sharp pain in left side of chest while driving here .    Past Medical History:   Diagnosis Date     Allergic state      Asthma age 12     Eczema      Papillary thyroid carcinoma (H) 12/9/13    right 1.5 cm, ETE, LN+, BRAF+     Postsurgical hypothyroidism 12/9/13     Thyroid nodule      Current Outpatient Medications   Medication Sig Dispense Refill     albuterol (PROAIR HFA/PROVENTIL HFA/VENTOLIN HFA) 108 (90 Base) MCG/ACT inhaler Inhale 2 puffs into the lungs every 6 hours as needed for shortness of breath / dyspnea or wheezing 18 g 3     fluticasone-salmeterol (ADVAIR) 250-50 MCG/DOSE inhaler Inhale 1 puff into the lungs every 12 hours - fasting physical overdue; Appointment requested 60 each 0     levothyroxine (SYNTHROID/LEVOTHROID) 125 MCG tablet TAKE 2 TABS BY MOUTH DAILY ON MON TO SAT AND 1.5 TABS BY MOUTH DAILY ON  tablet 4     Probiotic Product (PROBIOTIC ADVANCED PO)        Social History     Tobacco Use     Smoking status: Never Smoker     Smokeless tobacco: Never Used   Substance Use Topics     Alcohol use: Yes     Comment: weekly; 4 x a week, 1-2 glass of wine       ROS:  Review of systems negative except as stated above.    OBJECTIVE:  BP (!) 140/90   Pulse 70   Temp 96.8  F (36  C) (Tympanic)   Resp 18   Wt 108 kg (238 lb)   SpO2 100%   BMI 25.86 kg/m    GENERAL APPEARANCE: healthy, alert and no distress  EYES: EOMI,  PERRL, conjunctiva clear  HENT: ear canals and TM's normal.  Nose and mouth without ulcers, erythema or lesions  NECK: supple, nontender, no lymphadenopathy  RESP: lungs clear to auscultation - no rales, rhonchi or wheezes  CV: regular rates and rhythm, normal S1 S2, no murmur  noted  ABDOMEN:  soft, nontender, no HSM or masses and bowel sounds normal  NEURO: Normal strength and tone, sensory exam grossly normal,  normal speech and mentation  SKIN: no suspicious lesions or rashes      Results for orders placed or performed in visit on 07/13/22   XR Chest 2 Views     Status: None    Narrative    XR CHEST 2 VW  7/13/2022 2:10 PM       INDICATION: Atypical chest pain  COMPARISON: 6/30/2022       Impression    IMPRESSION: Negative chest.    MARYLOU PINO MD         SYSTEM ID:  [id number]   Results for orders placed or performed in visit on 07/13/22   D dimer, quantitative     Status: Normal   Result Value Ref Range    D-Dimer Quantitative 0.44 0.00 - 0.50 ug/mL FEU    Narrative    This D-dimer assay is intended for use in conjunction with a clinical pretest probability assessment model to exclude pulmonary embolism (PE) and deep venous thrombosis (DVT) in outpatients suspected of PE or DVT. The cut-off value is 0.50 ug/mL FEU.   CBC with platelets and differential     Status: None   Result Value Ref Range    WBC Count 6.5 4.0 - 11.0 10e3/uL    RBC Count 4.97 4.40 - 5.90 10e6/uL    Hemoglobin 15.1 13.3 - 17.7 g/dL    Hematocrit 45.4 40.0 - 53.0 %    MCV 91 78 - 100 fL    MCH 30.4 26.5 - 33.0 pg    MCHC 33.3 31.5 - 36.5 g/dL    RDW 12.5 10.0 - 15.0 %    Platelet Count 194 150 - 450 10e3/uL    % Neutrophils 66 %    % Lymphocytes 26 %    % Monocytes 7 %    % Eosinophils 1 %    % Basophils 0 %    Absolute Neutrophils 4.3 1.6 - 8.3 10e3/uL    Absolute Lymphocytes 1.7 0.8 - 5.3 10e3/uL    Absolute Monocytes 0.5 0.0 - 1.3 10e3/uL    Absolute Eosinophils 0.1 0.0 - 0.7 10e3/uL    Absolute Basophils 0.0 0.0 - 0.2 10e3/uL   Symptomatic; Yes; 7/11/2022 COVID-19 Virus (Coronavirus) by PCR Nose     Status: Normal    Specimen: Nose; Swab   Result Value Ref Range    SARS CoV2 PCR Negative Negative, Testing sent to reference lab. Results will be returned via unsolicited result    Narrative    Testing was  performed using the Xpert Xpress SARS-CoV-2 Assay on the  Cepheid Gene-Xpert Instrument Systems. Additional information about  this Emergency Use Authorization (EUA) assay can be found via the Lab  Guide. This test should be ordered for the detection of SARS-CoV-2 in  individuals who meet SARS-CoV-2 clinical and/or epidemiological  criteria. Test performance is unknown in asymptomatic patients. This  test is for in vitro diagnostic use under the FDA EUA for  laboratories certified under CLIA to perform high complexity testing.  This test has not been FDA cleared or approved. A negative result  does not rule out the presence of PCR inhibitors in the specimen or  target RNA in concentration below the limit of detection for the  assay. The possibility of a false negative should be considered if  the patient's recent exposure or clinical presentation suggests  COVID-19. This test was validated by the [facility]. This laboratory is certified under  the Clinical Laboratory Improvement Amendments of 1988 (CLIA-88) as  qualified to perform high complexity laboratory testing.     CBC with platelets and differential     Status: None    Narrative    The following orders were created for panel order CBC with platelets and differential.  Procedure                               Abnormality         Status                     ---------                               -----------         ------                     CBC with platelets and d...[[id number]]                      Final result                 Please view results for these tests on the individual orders.     EKG: NSR    ASSESSMENT:  (R07.89) Atypical chest pain  (primary encounter diagnosis)  Comment:does not appear to be cardiopulmonary in etiology   Plan: EKG 12-lead complete w/read - Clinics, XR Chest        2 Views, D dimer, quantitative, CBC with         platelets and differential, Symptomatic; Yes;         7/11/2022  COVID-19 Virus (Coronavirus) by PCR      Follow up in 2 days, ED if recurrent chest pain    Patient Instructions   Follow up immediately with severe headache, chest pain, or shortness of breath    Rest, isolate for results, hydrate, follow up if worsening or new symptoms  Unvaccinated household members to isolate until test results, if positive isolate for 2 weeks and follow up for testing if symptoms occur         Patient Education     Coronavirus Disease 2019 (COVID-19): Caring for Yourself or Others   If you or a household member have symptoms of COVID-19, follow the guidelines below. This will help you manage symptoms and keep the virus from spreading.  If you have symptoms of COVID-19    Stay home and contact your care team. They will tell you what to do.    Don t panic. Keep in mind that other illnesses can cause similar symptoms.    Stay away from work, school, and public places.    Limit physical contact with others in your home. Limit visitors. No kissing.  Clean surfaces you touch with disinfectant.  If you need to cough or sneeze, do it into a tissue. Then throw the tissue into the trash. If you don't have tissues, cough or sneeze into the bend of your elbow.  Don t share food or personal items with people in your household. This includes items like eating and drinking utensils, towels, and bedding.  Wear a cloth face mask around other people. During a public health emergency, medical face masks may be reserved for healthcare workers. You may need to make a cloth face mask of your own. You can do this using a bandana, T-shirt, or other cloth. The CDC has instructions on how to make a face mask. Wear the mask so that it covers both your nose and mouth.  If you need to go to a hospital or clinic, call ahead to let them know. Expect the care team to wear masks, gowns, gloves, and eye protection. You may need to come to a different entrance or wait in a separate room.  Follow all instructions from your care  team.    If you ve been diagnosed with COVID-19    Stay home and away from others, including other people in your home. (This is called self-isolation.) Don t leave home unless you need to get medical care. Don t go to work, school, or public places. Don t use buses, taxis, or other public transportation.    Follow all instructions from your care team.    If you need to go to a hospital or clinic, call ahead to let them know. Expect the care team to wear masks, gowns, gloves, and eye protection. You may need to come to a different entrance or wait in a separate room.    Wear a face mask over your nose and mouth. This is to protect others from your germs. If you can t wear a mask, your caregivers should wear one. You may need to make your own mask using a bandana, T-shirt, or other cloth. See the [facility] s instructions on how to make a face mask.    Have no contact with pets and other animals.    Don t share food or personal items with people in your household. This includes items like eating and drinking utensils, towels, and bedding.    If you need to cough or sneeze, do it into a tissue. Then throw the tissue into the trash. If you don't have tissues, cough or sneeze into the bend of your elbow.    Wash your hands often.    Self-care at home   At this time, there is no medicine approved to prevent or treat COVID-19. The FDA has approved an antiviral medicine (called remdesivir) for people being treated in the hospital. This is for people 12 years and older who weigh more than about 88 pounds (40 kgs). In certain cases, it may also be used for people younger than 12 years or who weigh less than about 88 pounds (40 kgs)..  Currently, treatment is mostly aimed at helping your body while it fights the virus.    Getting extra rest.    Drink extra fluids 6 to 8 glasses of liquids each day), unless a doctor has told you not to. Ask your care team which fluids are best for you. Avoid fluids that contain caffeine or  alcohol.    Taking over-the-counter (OTC) medicine to reduce pain and fever. Your care team will tell you which medicine to use.  If you ve been in the hospital for COVID-19, follow your care team s instructions. They will tell you when to stop self-isolation. They may also have you change positions to help your breathing (such as lying on your belly).  If a test showed that you have COVID-19, you may be asked to donate plasma after you ve recovered. (This is called COVID-19 convalescent plasma donation.) The plasma may contain antibodies to help fight the virus in others. Experts don't know if the plasma will work well as a treatment. Research continues, and the FDA has approved it for emergency use in certain people with serious or life-threatening COVID-19. For more information, talk to your care team.  Caring for a sick person     Follow all instructions from the care team.    Wash your hands often.    Wear protective clothing as advised.    Make sure the sick person wears a mask. If they can't wear a mask, don t stay in the same room with them. If you must be in the same room, wear a face mask. Make sure the mask covers both the nose and mouth.    Keep track of the sick person s symptoms.    Clean surfaces often with disinfectant. This includes phones, kitchen counters, fridge door handles, bathroom surfaces, and others.    Don t let anyone share household items with the sick person. This includes eating and drinking tools, towels, sheets, or blankets.    Clean fabrics and laundry well.    Keep other people and pets away from the sick person.    When you can stop self-isolation  When you are sick with COVID-19, you should stay away from other people. This is called self-isolation. The rules for ending self-isolation depend on your health in general.  If you are normally healthy:  You can stop self-isolation when all 3 of these are true:    You ve had no fever--and no medicine that reduces fever--for at least 24  hours. And     Your symptoms are better, such as cough or trouble breathing. And     At least 10 days have passed since your symptoms first started.  Talk with your care team before you leave home. They may tell you it s okay to leave, or they may give you different advice. You do not need to be re-tested.  If you have a weak immune system, or you ve had severe COVID-19:  Follow your care team s instructions. You may be asked to self-isolate for 10 days to 20 days after your symptoms first started. Your care team may want to re-test you for COVID-19.  Note: If you re being treated for cancer, have an immune disorder (such as HIV), or have had a transplant (organ or bone marrow), you may have a weak immune system.  If you've already had COVID-19 once:  If you had the virus over 3 months ago, and you ve been exposed again, treat it like you've never had COVID-19. Stay home, limit your contact with others, call your care team, and watch for symptoms.  If it s been less than 3 months since you had the virus, you re symptom-free, and you ve been exposed again: You don t need to self-isolate or be re-tested. But if you develop new symptoms that can t be linked to another illness, please self-isolate and contact your care team.  When you return to public settings  When you are well enough to go outside your home, follow the CDC s guidance on cloth face masks.    Anyone over age 2 should wear a face mask in public, especially when it's hard to socially distance. This includes public transit, public protests and marches, and crowded stores, bars, and restaurants.    Face masks are most likely to reduce the spread of COVID-19 when they are widely used by people who are out in the public.  Certain people should not wear a face covering. These include:    Children under 2 years old    Anyone with a health, developmental, or mental health condition that can be made worse by wearing a mask    Anyone who is unconscious or unable  to remove the face covering without help. See the CDC's guidance on who should not wear a face mask.  When to call your care team  Call your care team right away if a sick person has any of these:    Trouble breathing    Pain or pressure in chest  If a sick person has any of these, call 911:    Trouble breathing that gets worse    Pain or pressure in chest that gets worse    Blue tint to lips or face    Fast or irregular heartbeat    Confusion or trouble waking    Fainting or loss of consciousness    Coughing up blood  Going home from the hospital   If you have COVID-19 and were recently in the hospital:    Follow the instructions above for self-care and isolation.    Follow the hospital care team s instructions.    Ask questions if anything is unclear to you. Write down answers so you remember them.  Date last modified: 11/23/2020  Nate last reviewed this educational content on 4/1/2020  This information has been modified by your health care provider with permission from the publisher.    7411-3316 [employer]. [address] [place] [address], [place] [address]. All rights reserved. This information is not intended as a substitute for professional medical care. Always follow your healthcare professional's instructions.

## 2022-08-18 ENCOUNTER — OFFICE VISIT (OUTPATIENT)
Dept: ALLERGY | Facility: CLINIC | Age: 47
End: 2022-08-18
Payer: COMMERCIAL

## 2022-08-18 VITALS
HEART RATE: 68 BPM | OXYGEN SATURATION: 96 % | SYSTOLIC BLOOD PRESSURE: 138 MMHG | DIASTOLIC BLOOD PRESSURE: 80 MMHG | WEIGHT: 235.9 LBS | BODY MASS INDEX: 25.64 KG/M2

## 2022-08-18 DIAGNOSIS — J30.1 SEASONAL ALLERGIC RHINITIS DUE TO POLLEN: Primary | ICD-10-CM

## 2022-08-18 DIAGNOSIS — J45.40 MODERATE PERSISTENT EXTRINSIC ASTHMA WITHOUT COMPLICATION: ICD-10-CM

## 2022-08-18 PROCEDURE — 99203 OFFICE O/P NEW LOW 30 MIN: CPT | Performed by: INTERNAL MEDICINE

## 2022-08-18 RX ORDER — FEXOFENADINE HCL 180 MG/1
180 TABLET ORAL DAILY
COMMUNITY

## 2022-08-18 ASSESSMENT — ENCOUNTER SYMPTOMS
JOINT SWELLING: 0
ARTHRALGIAS: 0
COUGH: 0
EYE ITCHING: 0
SHORTNESS OF BREATH: 0
FATIGUE: 0
ROS SKIN COMMENTS: ECZEMA
MYALGIAS: 0
DIARRHEA: 0
VOMITING: 0
NAUSEA: 0
CHEST TIGHTNESS: 0
WHEEZING: 0
EYE REDNESS: 0
ADENOPATHY: 0
HEADACHES: 0
FEVER: 0
EYE DISCHARGE: 0
RHINORRHEA: 0
ACTIVITY CHANGE: 0
SINUS PRESSURE: 0
FACIAL SWELLING: 0

## 2022-08-18 ASSESSMENT — ASTHMA QUESTIONNAIRES: ACT_TOTALSCORE: 25

## 2022-08-18 NOTE — PROGRESS NOTES
José Augustine was seen in the Allergy Clinic at [facility].    José Augustine is a 47 year old male being seen today at the request of Dr. Mathur in consultation for asthma and allergies.    He has a history of asthma which was diagnosed around age 10 years old.  He has never required present prednisone from what he recalls.  He is not required emergency department visits for his asthma.  He is currently using Advair 250 mcg 1 puff twice daily.  He was using this 1 puff daily for COVID but when the COVID pandemic started he wanted to be cautious and increase the Advair dosing.    He rarely requires an albuterol inhaler.  This spring he had chest tightness for which he was using the ProAir but when he was evaluated in urgent care they deemed it was more musculoskeletal rather than an asthma flare.    He does have a history of eczema right ring finger and right upper eyelid.  Triamcinolone works well when he uses it on his finger.    For his allergic rhinitis he uses Allegra daily with decent results.    In the fall he has increased symptoms of fatigue and what he calls brain fog with minimal runny nose and occasional shortness of breath.  However the fatigue and change in mental cognition is his primary complaint.      He was seen by ENT a year and a half ago and was started on sublingual immunotherapy.  He is not sure if this is providing benefit.  He does know he is ragweed allergic and he is not sure what the other allergens were.    He has cats in the home as of May 2022 and is wondering if that is causing any increased allergy symptoms.    Past Medical History:   Diagnosis Date     Allergic state      Asthma age 12     Eczema      Papillary thyroid carcinoma (H) 12/9/13    right 1.5 cm, ETE, LN+, BRAF+     Postsurgical hypothyroidism 12/9/13     Thyroid nodule      Family History   Problem Relation Age of Onset     Cancer Father         skin     Diabetes Other      Thyroid Cancer No family hx  of      Thyroid Disease No family hx of      Past Surgical History:   Procedure Laterality Date     ORTHOPEDIC SURGERY      shoulder labral repair     THYROIDECTOMY  12/9/2013    Procedure: THYROIDECTOMY;  Total Thyroidectomy, Central Neck Dissection ;  Surgeon: Pillo Henley MD;  Location:  OR       ENVIRONMENTAL HISTORY:   Pets inside the house include 2 cat(s).  Do you smoke cigarettes or other recreational drugs? No There is/are 0 smokers living in the house. The house does not have a damp basement.     SOCIAL HISTORY:   José is employed as . He lives with his spouse and 2 daughters.      Review of Systems   Constitutional: Negative for activity change, fatigue and fever.   HENT: Negative for congestion, dental problem, ear pain, facial swelling, nosebleeds, postnasal drip, rhinorrhea, sinus pressure and sneezing.    Eyes: Negative for discharge, redness and itching.   Respiratory: Negative for cough, chest tightness, shortness of breath and wheezing.    Cardiovascular: Negative for chest pain.   Gastrointestinal: Negative for diarrhea, nausea and vomiting.   Musculoskeletal: Negative for arthralgias, joint swelling and myalgias.   Skin: Negative for rash.        Eczema    Neurological: Negative for headaches.   Hematological: Negative for adenopathy.   Psychiatric/Behavioral: Negative for behavioral problems and self-injury.         Current Outpatient Medications:      albuterol (PROAIR HFA/PROVENTIL HFA/VENTOLIN HFA) 108 (90 Base) MCG/ACT inhaler, Inhale 2 puffs into the lungs every 6 hours as needed for shortness of breath / dyspnea or wheezing, Disp: 18 g, Rfl: 3     fexofenadine (ALLEGRA) 180 MG tablet, Take 180 mg by mouth daily, Disp: , Rfl:      fluticasone-salmeterol (ADVAIR) 250-50 MCG/DOSE inhaler, Inhale 1 puff into the lungs every 12 hours - fasting physical overdue; Appointment requested, Disp: 60 each, Rfl: 0     levothyroxine (SYNTHROID/LEVOTHROID) 125 MCG tablet, TAKE 2 TABS BY  MOUTH DAILY ON MON TO SAT AND 1.5 TABS BY MOUTH DAILY ON SUN, Disp: 160 tablet, Rfl: 4     Probiotic Product (PROBIOTIC ADVANCED PO), , Disp: , Rfl:   No Known Allergies      EXAM:   /80   Pulse 68   Wt 107 kg (235 lb 14.4 oz)   SpO2 96%   BMI 25.64 kg/m      Physical Exam  Constitutional:       General: He is not in acute distress.     Appearance: Normal appearance. He is not ill-appearing.   HENT:      Head: Normocephalic and atraumatic.      Nose: Nose normal. No congestion or rhinorrhea.      Mouth/Throat:      Mouth: Mucous membranes are moist.      Pharynx: Oropharynx is clear. No posterior oropharyngeal erythema.   Eyes:      General:         Right eye: No discharge.         Left eye: No discharge.   Cardiovascular:      Rate and Rhythm: Normal rate and regular rhythm.      Heart sounds: Normal heart sounds.   Pulmonary:      Effort: Pulmonary effort is normal.      Breath sounds: Normal breath sounds. No wheezing or rhonchi.   Skin:     General: Skin is warm.      Findings: No erythema or rash.   Neurological:      General: No focal deficit present.      Mental Status: He is alert. Mental status is at baseline.   Psychiatric:         Mood and Affect: Mood normal.         Behavior: Behavior normal.     WORKUP: Unable to perform skin testing since he was on Allegra.  Histamine did not develop.    ASSESSMENT/PLAN:  José Augustine is a 47 year old male with a history of allergic rhinitis as well as asthma.  Asthma is very well controlled on medium dose Advair at this time.  He is using this twice daily.  No recent prednisone and rare use of albuterol.  He also has atypical allergy symptoms with fatigue as well as brain fog in the fall.  Currently on sublingual immunotherapy.    He plans to return in 2 weeks to do skin testing off of Allegra.  May consider allergy immunotherapy.  Allergy immunotherapy typically has a better outcome than sublingual immunotherapy.  However his symptoms are atypical.  With  poorly controlled allergies it can affect sleep which may affect his cognition however it is uncertain if he is having much sleep difficulty.    His asthma is also very well controlled and we will plan to decrease his Advair as long as his spirometry is normal.  We will plan to check a spirometry at follow-up.    Follow-up in 2 weeks      Thank you for allowing me to participate in the care of José Augustine.      A total of 40 minutes was spent on the day of the encounter performing chart review, history and exam, documentation, and counseling and coordination of care as noted above.       Sergio Dorantes MD  Allergy/Immunology  [facility]

## 2022-08-18 NOTE — PATIENT INSTRUCTIONS
Stop allegra for 7-10 days prior to your appointment.   Will check skin tests in 2 weeks  Check Spirometry in 2 weeks prior to your appointment

## 2022-08-18 NOTE — LETTER
8/18/2022         RE: José Augustine  [address] [facility] [address]        Dear Colleague,    Thank you for referring your patient, José Augustine, to the [facility] SPECIAL[facility] [place]. Please see a copy of my visit note below.    José Augustine was seen in the Allergy Clinic at [facility].    José Augustine is a 47 year old male being seen today at the request of Dr. Mathur in consultation for asthma and allergies.    He has a history of asthma which was diagnosed around age 10 years old.  He has never required present prednisone from what he recalls.  He is not required emergency department visits for his asthma.  He is currently using Advair 250 mcg 1 puff twice daily.  He was using this 1 puff daily for COVID but when the COVID pandemic started he wanted to be cautious and increase the Advair dosing.    He rarely requires an albuterol inhaler.  This spring he had chest tightness for which he was using the ProAir but when he was evaluated in urgent care they deemed it was more musculoskeletal rather than an asthma flare.    He does have a history of eczema right ring finger and right upper eyelid.  Triamcinolone works well when he uses it on his finger.    For his allergic rhinitis he uses Allegra daily with decent results.    In the fall he has increased symptoms of fatigue and what he calls brain fog with minimal runny nose and occasional shortness of breath.  However the fatigue and change in mental cognition is his primary complaint.      He was seen by ENT a year and a half ago and was started on sublingual immunotherapy.  He is not sure if this is providing benefit.  He does know he is ragweed allergic and he is not sure what the other allergens were.    He has cats in the home as of May 2022 and is wondering if that is causing any increased allergy symptoms.    Past Medical History:   Diagnosis Date     Allergic state      Asthma age 12     Eczema      Papillary  thyroid carcinoma (H) 12/9/13    right 1.5 cm, ETE, LN+, BRAF+     Postsurgical hypothyroidism 12/9/13     Thyroid nodule      Family History   Problem Relation Age of Onset     Cancer Father         skin     Diabetes Other      Thyroid Cancer No family hx of      Thyroid Disease No family hx of      Past Surgical History:   Procedure Laterality Date     ORTHOPEDIC SURGERY      shoulder labral repair     THYROIDECTOMY  12/9/2013    Procedure: THYROIDECTOMY;  Total Thyroidectomy, Central Neck Dissection ;  Surgeon: Pillo Henley MD;  Location:  OR       ENVIRONMENTAL HISTORY:   Pets inside the house include 2 cat(s).  Do you smoke cigarettes or other recreational drugs? No There is/are 0 smokers living in the house. The house does not have a damp basement.     SOCIAL HISTORY:   José is employed as . He lives with his spouse and 2 daughters.      Review of Systems   Constitutional: Negative for activity change, fatigue and fever.   HENT: Negative for congestion, dental problem, ear pain, facial swelling, nosebleeds, postnasal drip, rhinorrhea, sinus pressure and sneezing.    Eyes: Negative for discharge, redness and itching.   Respiratory: Negative for cough, chest tightness, shortness of breath and wheezing.    Cardiovascular: Negative for chest pain.   Gastrointestinal: Negative for diarrhea, nausea and vomiting.   Musculoskeletal: Negative for arthralgias, joint swelling and myalgias.   Skin: Negative for rash.        Eczema    Neurological: Negative for headaches.   Hematological: Negative for adenopathy.   Psychiatric/Behavioral: Negative for behavioral problems and self-injury.         Current Outpatient Medications:      albuterol (PROAIR HFA/PROVENTIL HFA/VENTOLIN HFA) 108 (90 Base) MCG/ACT inhaler, Inhale 2 puffs into the lungs every 6 hours as needed for shortness of breath / dyspnea or wheezing, Disp: 18 g, Rfl: 3     fexofenadine (ALLEGRA) 180 MG tablet, Take 180 mg by mouth daily, Disp:  , Rfl:      fluticasone-salmeterol (ADVAIR) 250-50 MCG/DOSE inhaler, Inhale 1 puff into the lungs every 12 hours - fasting physical overdue; Appointment requested, Disp: 60 each, Rfl: 0     levothyroxine (SYNTHROID/LEVOTHROID) 125 MCG tablet, TAKE 2 TABS BY MOUTH DAILY ON MON TO SAT AND 1.5 TABS BY MOUTH DAILY ON SUN, Disp: 160 tablet, Rfl: 4     Probiotic Product (PROBIOTIC ADVANCED PO), , Disp: , Rfl:   No Known Allergies      EXAM:   /80   Pulse 68   Wt 107 kg (235 lb 14.4 oz)   SpO2 96%   BMI 25.64 kg/m      Physical Exam  Constitutional:       General: He is not in acute distress.     Appearance: Normal appearance. He is not ill-appearing.   HENT:      Head: Normocephalic and atraumatic.      Nose: Nose normal. No congestion or rhinorrhea.      Mouth/Throat:      Mouth: Mucous membranes are moist.      Pharynx: Oropharynx is clear. No posterior oropharyngeal erythema.   Eyes:      General:         Right eye: No discharge.         Left eye: No discharge.   Cardiovascular:      Rate and Rhythm: Normal rate and regular rhythm.      Heart sounds: Normal heart sounds.   Pulmonary:      Effort: Pulmonary effort is normal.      Breath sounds: Normal breath sounds. No wheezing or rhonchi.   Skin:     General: Skin is warm.      Findings: No erythema or rash.   Neurological:      General: No focal deficit present.      Mental Status: He is alert. Mental status is at baseline.   Psychiatric:         Mood and Affect: Mood normal.         Behavior: Behavior normal.     WORKUP: Unable to perform skin testing since he was on Allegra.  Histamine did not develop.    ASSESSMENT/PLAN:  José Augustine is a 47 year old male with a history of allergic rhinitis as well as asthma.  Asthma is very well controlled on medium dose Advair at this time.  He is using this twice daily.  No recent prednisone and rare use of albuterol.  He also has atypical allergy symptoms with fatigue as well as brain fog in the fall.  Currently on  sublingual immunotherapy.    He plans to return in 2 weeks to do skin testing off of Allegra.  May consider allergy immunotherapy.  Allergy immunotherapy typically has a better outcome than sublingual immunotherapy.  However his symptoms are atypical.  With poorly controlled allergies it can affect sleep which may affect his cognition however it is uncertain if he is having much sleep difficulty.    His asthma is also very well controlled and we will plan to decrease his Advair as long as his spirometry is normal.  We will plan to check a spirometry at follow-up.    Follow-up in 2 weeks      Thank you for allowing me to participate in the care of José Augustine.      A total of 40 minutes was spent on the day of the encounter performing chart review, history and exam, documentation, and counseling and coordination of care as noted above.       Sergio Dorantes MD  Allergy/Immunology  [facility]          Again, thank you for allowing me to participate in the care of your patient.        Sincerely,        Sergio Dorantes MD

## 2022-09-01 ENCOUNTER — TELEPHONE (OUTPATIENT)
Dept: ALLERGY | Facility: CLINIC | Age: 47
End: 2022-09-01

## 2022-09-01 NOTE — TELEPHONE ENCOUNTER
[facility] Call Center    Phone Message    May a detailed message be left on voicemail: yes     Reason for Call: Appointment Intake    Referring Provider Name: KAYDEN  Diagnosis and/or Symptoms: Pt would like to reschedule the 2 Appt's on 09/09. Being allergy season pt is not comfortable being off his allergy meds 7 days prior to Appt. Please reschedule off allergy season.   Please call pt back to discuss. Thanks       Action Taken: Message routed to:  [facility]): Allergy     Travel Screening: Not Applicable

## 2022-09-01 NOTE — TELEPHONE ENCOUNTER
Dr. Dorantes OK to reschedule pt to another time? If so when should pt be seen? As for the PFT when should this be done?    Please advise, see pts message below.    Angel Bee MA

## 2022-09-09 ENCOUNTER — OFFICE VISIT (OUTPATIENT)
Dept: PULMONOLOGY | Facility: CLINIC | Age: 47
End: 2022-09-09
Payer: COMMERCIAL

## 2022-09-09 DIAGNOSIS — J45.40 MODERATE PERSISTENT EXTRINSIC ASTHMA WITHOUT COMPLICATION: ICD-10-CM

## 2022-09-09 LAB
EXPTIME-PRE: 6.73 SEC
FEF2575-%PRED-PRE: 82 %
FEF2575-PRE: 3.88 L/SEC
FEF2575-PRED: 4.73 L/SEC
FEFMAX-%PRED-PRE: 105 %
FEFMAX-PRE: 12.82 L/SEC
FEFMAX-PRED: 12.19 L/SEC
FEV1-%PRED-PRE: 97 %
FEV1-PRE: 5.19 L
FEV1FEV6-PRE: 73 %
FEV1FEV6-PRED: 81 %
FEV1FVC-PRE: 73 %
FEV1FVC-PRED: 78 %
FIFMAX-PRE: 9.76 L/SEC
FVC-%PRED-PRE: 103 %
FVC-PRE: 7.1 L
FVC-PRED: 6.87 L

## 2022-09-09 PROCEDURE — 94375 RESPIRATORY FLOW VOLUME LOOP: CPT | Performed by: INTERNAL MEDICINE

## 2022-12-02 ENCOUNTER — OFFICE VISIT (OUTPATIENT)
Dept: ALLERGY | Facility: CLINIC | Age: 47
End: 2022-12-02
Payer: COMMERCIAL

## 2022-12-02 VITALS
HEART RATE: 79 BPM | OXYGEN SATURATION: 98 % | BODY MASS INDEX: 25.91 KG/M2 | DIASTOLIC BLOOD PRESSURE: 79 MMHG | SYSTOLIC BLOOD PRESSURE: 125 MMHG | WEIGHT: 238.4 LBS

## 2022-12-02 DIAGNOSIS — J30.1 SEASONAL ALLERGIC RHINITIS DUE TO POLLEN: Primary | ICD-10-CM

## 2022-12-02 DIAGNOSIS — J45.30 MILD PERSISTENT ASTHMA WITHOUT COMPLICATION: ICD-10-CM

## 2022-12-02 PROCEDURE — 99214 OFFICE O/P EST MOD 30 MIN: CPT | Performed by: INTERNAL MEDICINE

## 2022-12-02 RX ORDER — FLUTICASONE PROPIONATE AND SALMETEROL 100; 50 UG/1; UG/1
1 POWDER RESPIRATORY (INHALATION) EVERY 12 HOURS
Qty: 1 EACH | Refills: 4 | Status: SHIPPED | OUTPATIENT
Start: 2022-12-02 | End: 2023-06-02

## 2022-12-02 ASSESSMENT — ASTHMA QUESTIONNAIRES: ACT_TOTALSCORE: 25

## 2022-12-02 NOTE — LETTER
12/2/2022         RE: José Augustine  [address] [facility] [address]        Dear Colleague,    Thank you for referring your patient, José Augustine, to the [facility] SPECIAL[facility] CLINIC [place]. Please see a copy of my visit note below.    José Augustine was seen in the Allergy Clinic at [facility].    José Augustine is a 47 year old male being seen today for ongoing evaluation of skin testing    Since the last visit the patient has been good.    He has a history of asthma as well as eczema (right ring finger) and allergic rhinitis.  Currently on sublingual ragweed from his ENT who gets the sublingual drops from [facility].    Current dose of Advair is 250 mcg 1 puff twice daily.  Since last appointment he did have a spirometry which was within normal limits with an FEV1 of 97% of predicted % predicted.  He only rarely requires albuterol.    Allergic rhinitis he does take Allegra typically summer and fall.  Typically has symptoms from August until the first frost.  He did feel that this fall season was better than previous.  He is not certain if this is from the sublingual drops or not.    Past Medical History:   Diagnosis Date     Allergic state      Asthma age 12     Eczema      Papillary thyroid carcinoma (H) 12/9/13    right 1.5 cm, ETE, LN+, BRAF+     Postsurgical hypothyroidism 12/9/13     Thyroid nodule      Family History   Problem Relation Age of Onset     Cancer Father         skin     Diabetes Other      Thyroid Cancer No family hx of      Thyroid Disease No family hx of      Past Surgical History:   Procedure Laterality Date     ORTHOPEDIC SURGERY      shoulder labral repair     THYROIDECTOMY  12/9/2013    Procedure: THYROIDECTOMY;  Total Thyroidectomy, Central Neck Dissection ;  Surgeon: Pillo Henley MD;  [facility]         Current Outpatient Medications:      albuterol (PROAIR HFA/PROVENTIL HFA/VENTOLIN HFA) 108 (90 Base) MCG/ACT inhaler, Inhale 2 puffs into  the lungs every 6 hours as needed for shortness of breath / dyspnea or wheezing, Disp: 18 g, Rfl: 3     fexofenadine (ALLEGRA) 180 MG tablet, Take 180 mg by mouth daily, Disp: , Rfl:      fluticasone-salmeterol (ADVAIR) 100-50 MCG/ACT inhaler, Inhale 1 puff into the lungs every 12 hours, Disp: 1 each, Rfl: 4     fluticasone-salmeterol (ADVAIR) 250-50 MCG/DOSE inhaler, Inhale 1 puff into the lungs every 12 hours - fasting physical overdue; Appointment requested, Disp: 60 each, Rfl: 0     levothyroxine (SYNTHROID/LEVOTHROID) 125 MCG tablet, TAKE 2 TABS BY MOUTH DAILY ON MON TO SAT AND 1.5 TABS BY MOUTH DAILY ON SUN, Disp: 160 tablet, Rfl: 4     Probiotic Product (PROBIOTIC ADVANCED PO), , Disp: , Rfl:   No Known Allergies      EXAM:   /79   Pulse 79   Wt 108.1 kg (238 lb 6.4 oz)   SpO2 98%   BMI 25.91 kg/m      Constitutional:       General: He is not in acute distress.     Appearance: Normal appearance. He is not ill-appearing.   HENT:      Head: Normocephalic and atraumatic.   Eyes:      General:         Right eye: No discharge.         Left eye: No discharge.     Pulmonary:      Effort: Pulmonary effort is normal.   Skin:     General: Skin is warm.      Findings: There is mild scaling of the right upper eyelid.  The right ring finger is without a rash.  Neurological:      General: No focal deficit present.      Mental Status: He is alert. Mental status is at baseline.   Psychiatric:         Mood and Affect: Mood normal.         Behavior: Behavior normal.     ASSESSMENT/PLAN:  José Augustine is a 47 year old male seen today for ongoing evaluation of allergic rhinitis.  Also here to discuss asthma.  Spirometry is normal since last visit.  Unable to skin test since his skin test is still blocked.  Potentially from the sublingual drops since he is not on any other medications.    Need to check labs for environmental allergies.  At this point he is not interested in allergy shots.  He may consider immunotherapy  for ragweed sublingually which would be Ragwitek.    Will also reduce the dose of Advair to 100/50 since he is clinically doing well and has a normal spirometry.    1. Will check labs  2. If ragweed only, will consider Ragwitek  3. Allergy shots on the backburner for now  4. Allegra as needed  5. Advair will be reduced to 100 mcg 1 puff twice daily.  At follow-up may reduce that to daily use only.    Follow-up in 6 months      Thank you for allowing me to participate in the care of José Augustine.      I spent 30 minutes on the date of the encounter doing chart review, history and exam, documentation and further coordination as noted above exclusive of separately reported interpretations    Sergio Dorantes MD  Allergy/Immunology  [facility]      Again, thank you for allowing me to participate in the care of your patient.        Sincerely,        Sergio Dorantes MD

## 2022-12-02 NOTE — PROGRESS NOTES
José Augustine was seen in the Allergy Clinic at [facility].    José Augustine is a 47 year old male being seen today for ongoing evaluation of skin testing    Since the last visit the patient has been good.    He has a history of asthma as well as eczema (right ring finger) and allergic rhinitis.  Currently on sublingual ragweed from his ENT who gets the sublingual drops from [facility].    Current dose of Advair is 250 mcg 1 puff twice daily.  Since last appointment he did have a spirometry which was within normal limits with an FEV1 of 97% of predicted % predicted.  He only rarely requires albuterol.    Allergic rhinitis he does take Allegra typically summer and fall.  Typically has symptoms from August until the first frost.  He did feel that this fall season was better than previous.  He is not certain if this is from the sublingual drops or not.    Past Medical History:   Diagnosis Date     Allergic state      Asthma age 12     Eczema      Papillary thyroid carcinoma (H) 12/9/13    right 1.5 cm, ETE, LN+, BRAF+     Postsurgical hypothyroidism 12/9/13     Thyroid nodule      Family History   Problem Relation Age of Onset     Cancer Father         skin     Diabetes Other      Thyroid Cancer No family hx of      Thyroid Disease No family hx of      Past Surgical History:   Procedure Laterality Date     ORTHOPEDIC SURGERY      shoulder labral repair     THYROIDECTOMY  12/9/2013    Procedure: THYROIDECTOMY;  Total Thyroidectomy, Central Neck Dissection ;  Surgeon: Pillo Henley MD;  [facility] OR         Current Outpatient Medications:      albuterol (PROAIR HFA/PROVENTIL HFA/VENTOLIN HFA) 108 (90 Base) MCG/ACT inhaler, Inhale 2 puffs into the lungs every 6 hours as needed for shortness of breath / dyspnea or wheezing, Disp: 18 g, Rfl: 3     fexofenadine (ALLEGRA) 180 MG tablet, Take 180 mg by mouth daily, Disp: , Rfl:      fluticasone-salmeterol (ADVAIR) 100-50 MCG/ACT inhaler, Inhale 1  puff into the lungs every 12 hours, Disp: 1 each, Rfl: 4     fluticasone-salmeterol (ADVAIR) 250-50 MCG/DOSE inhaler, Inhale 1 puff into the lungs every 12 hours - fasting physical overdue; Appointment requested, Disp: 60 each, Rfl: 0     levothyroxine (SYNTHROID/LEVOTHROID) 125 MCG tablet, TAKE 2 TABS BY MOUTH DAILY ON MON TO SAT AND 1.5 TABS BY MOUTH DAILY ON SUN, Disp: 160 tablet, Rfl: 4     Probiotic Product (PROBIOTIC ADVANCED PO), , Disp: , Rfl:   No Known Allergies      EXAM:   /79   Pulse 79   Wt 108.1 kg (238 lb 6.4 oz)   SpO2 98%   BMI 25.91 kg/m      Constitutional:       General: He is not in acute distress.     Appearance: Normal appearance. He is not ill-appearing.   HENT:      Head: Normocephalic and atraumatic.   Eyes:      General:         Right eye: No discharge.         Left eye: No discharge.     Pulmonary:      Effort: Pulmonary effort is normal.   Skin:     General: Skin is warm.      Findings: There is mild scaling of the right upper eyelid.  The right ring finger is without a rash.  Neurological:      General: No focal deficit present.      Mental Status: He is alert. Mental status is at baseline.   Psychiatric:         Mood and Affect: Mood normal.         Behavior: Behavior normal.     ASSESSMENT/PLAN:  José Augustine is a 47 year old male seen today for ongoing evaluation of allergic rhinitis.  Also here to discuss asthma.  Spirometry is normal since last visit.  Unable to skin test since his skin test is still blocked.  Potentially from the sublingual drops since he is not on any other medications.    Need to check labs for environmental allergies.  At this point he is not interested in allergy shots.  He may consider immunotherapy for ragweed sublingually which would be Ragwitek.    Will also reduce the dose of Advair to 100/50 since he is clinically doing well and has a normal spirometry.    1. Will check labs  2. If ragweed only, will consider Ragwitek  3. Allergy shots on the  backburner for now  4. Allegra as needed  5. Advair will be reduced to 100 mcg 1 puff twice daily.  At follow-up may reduce that to daily use only.    Follow-up in 6 months      Thank you for allowing me to participate in the care of José Augustine.      I spent 30 minutes on the date of the encounter doing chart review, history and exam, documentation and further coordination as noted above exclusive of separately reported interpretations    Sergio Dorantes MD  Allergy/Immunology  [facility]

## 2022-12-02 NOTE — PATIENT INSTRUCTIONS
Will check labs  If ragweed only, will consider Ragwitek  Allergy shots on the backburner for now  Allegra as needed        Allergy Staff Appt Hours Shot Hours Location         Physician   Sergio Dorantes MD      Support Staff   Tara KAHN, RN   Kunal APONTE, RN   Angel SANCHEZ, ANGELES MEZA, LPN          Mondays  Not available until January/2023 Wednesdays  Close    Tuesdays Thursdays and Fridays:  [place] 7-5                    [place]     Tuesdays: 7:40-3:20      Fridays: 7:40-4:20                [facility]  [address]  [place] [address]  Appt Line: [phone]    Pulmonary Function Scheduling:  [place]: [phone]         Questions about cost of your care?  For questions about your cost of your visit, procedure, lab or imaging contact:  [email or web address] [place] Consumer Price Line [phone] or visit: [email or web address]   admission

## 2022-12-13 ENCOUNTER — TRANSFERRED RECORDS (OUTPATIENT)
Dept: HEALTH INFORMATION MANAGEMENT | Facility: CLINIC | Age: 47
End: 2022-12-13

## 2022-12-15 ENCOUNTER — LAB (OUTPATIENT)
Dept: LAB | Facility: CLINIC | Age: 47
End: 2022-12-15
Payer: COMMERCIAL

## 2022-12-15 DIAGNOSIS — J30.1 SEASONAL ALLERGIC RHINITIS DUE TO POLLEN: ICD-10-CM

## 2022-12-15 PROCEDURE — 86003 ALLG SPEC IGE CRUDE XTRC EA: CPT | Mod: 59

## 2022-12-15 PROCEDURE — 36415 COLL VENOUS BLD VENIPUNCTURE: CPT

## 2022-12-16 LAB
D PTERONYSS IGE QN: <0.1 KU(A)/L
DOG DANDER+EPITH IGE QN: <0.1 KU(A)/L
E PURPURASCENS IGE QN: <0.1 KU(A)/L
ENGL PLANTAIN IGE QN: <0.1 KU(A)/L
FIREBUSH IGE QN: <0.1 KU(A)/L
GIANT RAGWEED IGE QN: 0.11 KU(A)/L
JOHNSON GRASS IGE QN: <0.1 KU(A)/L
WHITE ELM IGE QN: <0.1 KU(A)/L

## 2022-12-18 LAB
A ALTERNATA IGE QN: 2.47 KU(A)/L
A FUMIGATUS IGE QN: <0.1 KU(A)/L
C HERBARUM IGE QN: <0.1 KU(A)/L
CALIF WALNUT POLN IGE QN: <0.1 KU(A)/L
CAT DANDER IGG QN: <0.1 KU(A)/L
CEDAR IGE QN: <0.1 KU(A)/L
COMMON RAGWEED IGE QN: 0.15 KU(A)/L
COTTONWOOD IGE QN: <0.1 KU(A)/L
D FARINAE IGE QN: <0.1 KU(A)/L
EAST WHITE PINE IGE QN: <0.1 KU(A)/L
GOOSEFOOT IGE QN: <0.1 KU(A)/L
MAPLE IGE QN: <0.1 KU(A)/L
MUGWORT IGE QN: <0.1 KU(A)/L
NETTLE IGE QN: <0.1 KU(A)/L
P NOTATUM IGE QN: <0.1 KU(A)/L
RED MULBERRY IGE QN: <0.1 KU(A)/L
SALTWORT IGE QN: <0.1 KU(A)/L
SHEEP SORREL IGE QN: <0.1 KU(A)/L
SILVER BIRCH IGE QN: <0.1 KU(A)/L
TIMOTHY IGE QN: <0.1 KU(A)/L
WHITE ASH IGE QN: <0.1 KU(A)/L
WHITE MULBERRY IGE QN: <0.1 KU(A)/L
WHITE OAK IGE QN: <0.1 KU(A)/L
WORMWOOD IGE QN: <0.1 KU(A)/L

## 2022-12-19 ENCOUNTER — MYC MEDICAL ADVICE (OUTPATIENT)
Dept: ALLERGY | Facility: CLINIC | Age: 47
End: 2022-12-19

## 2023-05-03 DIAGNOSIS — C73 PAPILLARY THYROID CARCINOMA (H): ICD-10-CM

## 2023-05-03 DIAGNOSIS — E89.0 POSTSURGICAL HYPOTHYROIDISM: Chronic | ICD-10-CM

## 2023-05-03 RX ORDER — LEVOTHYROXINE SODIUM 125 UG/1
TABLET ORAL
Qty: 162 TABLET | Refills: 4 | Status: SHIPPED | OUTPATIENT
Start: 2023-05-03 | End: 2023-07-24

## 2023-06-01 ENCOUNTER — HEALTH MAINTENANCE LETTER (OUTPATIENT)
Age: 48
End: 2023-06-01

## 2023-06-02 ENCOUNTER — OFFICE VISIT (OUTPATIENT)
Dept: ALLERGY | Facility: CLINIC | Age: 48
End: 2023-06-02
Payer: COMMERCIAL

## 2023-06-02 VITALS
HEART RATE: 63 BPM | OXYGEN SATURATION: 97 % | DIASTOLIC BLOOD PRESSURE: 82 MMHG | SYSTOLIC BLOOD PRESSURE: 133 MMHG | WEIGHT: 241.7 LBS | BODY MASS INDEX: 26.27 KG/M2

## 2023-06-02 DIAGNOSIS — J30.1 SEASONAL ALLERGIC RHINITIS DUE TO POLLEN: ICD-10-CM

## 2023-06-02 DIAGNOSIS — J30.89 OTHER ALLERGIC RHINITIS: ICD-10-CM

## 2023-06-02 DIAGNOSIS — J45.30 MILD PERSISTENT ASTHMA WITHOUT COMPLICATION: Primary | ICD-10-CM

## 2023-06-02 PROCEDURE — 99214 OFFICE O/P EST MOD 30 MIN: CPT | Performed by: INTERNAL MEDICINE

## 2023-06-02 RX ORDER — FLUTICASONE PROPIONATE AND SALMETEROL 100; 50 UG/1; UG/1
1 POWDER RESPIRATORY (INHALATION) EVERY 12 HOURS
Qty: 1 EACH | Refills: 4 | Status: SHIPPED | OUTPATIENT
Start: 2023-06-02 | End: 2023-10-13

## 2023-06-02 ASSESSMENT — ASTHMA QUESTIONNAIRES
QUESTION_2 LAST FOUR WEEKS HOW OFTEN HAVE YOU HAD SHORTNESS OF BREATH: ONCE OR TWICE A WEEK
QUESTION_4 LAST FOUR WEEKS HOW OFTEN HAVE YOU USED YOUR RESCUE INHALER OR NEBULIZER MEDICATION (SUCH AS ALBUTEROL): NOT AT ALL
QUESTION_3 LAST FOUR WEEKS HOW OFTEN DID YOUR ASTHMA SYMPTOMS (WHEEZING, COUGHING, SHORTNESS OF BREATH, CHEST TIGHTNESS OR PAIN) WAKE YOU UP AT NIGHT OR EARLIER THAN USUAL IN THE MORNING: NOT AT ALL
ACT_TOTALSCORE: 23
QUESTION_5 LAST FOUR WEEKS HOW WOULD YOU RATE YOUR ASTHMA CONTROL: WELL CONTROLLED
QUESTION_1 LAST FOUR WEEKS HOW MUCH OF THE TIME DID YOUR ASTHMA KEEP YOU FROM GETTING AS MUCH DONE AT WORK, SCHOOL OR AT HOME: NONE OF THE TIME
ACT_TOTALSCORE: 23

## 2023-06-02 NOTE — PROGRESS NOTES
José Augustine was seen in the Allergy Clinic at [facility].    José Augustine is a 48 year old male being seen today for ongoing evaluation of seasonal allergic rhinitis due to pollen as well as mild persistent asthma.  Blood testing was positive to Alternaria and mildly to ragweed.  He does receive sublingual immunotherapy from his ENT provider who gets these from [facility].    Spirometry performed September 2022 was normal.  FEV1 was 97%.  At the last appointment he reduced his Advair dose from 250 mcg to 100 mcg 1 puff twice daily.  He noticed subtle symptoms of chest tightness however he has not required any albuterol since the switch.  He prefers to be on the lower dose.    He has had springtime allergy symptoms for which he started Allegra.  Typically only needed Allegra in the fall.  He has found the Allegra to be beneficial.    Since the last visit the patient has been feeling good.    Past Medical History:   Diagnosis Date     Allergic state      Asthma age 12     Eczema      Papillary thyroid carcinoma (H) 12/9/13    right 1.5 cm, ETE, LN+, BRAF+     Postsurgical hypothyroidism 12/9/13     Thyroid nodule      Family History   Problem Relation Age of Onset     Cancer Father         skin     Diabetes Other      Thyroid Cancer No family hx of      Thyroid Disease No family hx of      Past Surgical History:   Procedure Laterality Date     ORTHOPEDIC SURGERY      shoulder labral repair     THYROIDECTOMY  12/9/2013    Procedure: THYROIDECTOMY;  Total Thyroidectomy, Central Neck Dissection ;  Surgeon: Pillo Henley MD;  [facility] OR         Current Outpatient Medications:      albuterol (PROAIR HFA/PROVENTIL HFA/VENTOLIN HFA) 108 (90 Base) MCG/ACT inhaler, Inhale 2 puffs into the lungs every 6 hours as needed for shortness of breath / dyspnea or wheezing, Disp: 18 g, Rfl: 3     fexofenadine (ALLEGRA) 180 MG tablet, Take 180 mg by mouth daily, Disp: , Rfl:      fluticasone-salmeterol (ADVAIR)  100-50 MCG/ACT inhaler, Inhale 1 puff into the lungs every 12 hours, Disp: 1 each, Rfl: 4     levothyroxine (SYNTHROID/LEVOTHROID) 125 MCG tablet, TAKE 2 TABS BY MOUTH DAILY ON MON TO SAT AND 1.5 TABS BY MOUTH DAILY ON SUN, Disp: 162 tablet, Rfl: 4     Probiotic Product (PROBIOTIC ADVANCED PO), , Disp: , Rfl:   No Known Allergies      EXAM:   /82   Pulse 63   Wt 109.6 kg (241 lb 11.2 oz)   SpO2 97%   BMI 26.27 kg/m      Constitutional:       General: He is not in acute distress.     Appearance: Normal appearance. He is not ill-appearing.   HENT:      Head: Normocephalic and atraumatic.      Nose: Nose normal. No congestion or rhinorrhea.      Mouth/Throat:      Mouth: Mucous membranes are moist.      Pharynx: Oropharynx is clear. No posterior oropharyngeal erythema.   Eyes:      General:         Right eye: No discharge.         Left eye: No discharge.   Cardiovascular:      Rate and Rhythm: Normal rate and regular rhythm.      Heart sounds: Normal heart sounds.   Pulmonary:      Effort: Pulmonary effort is normal.      Breath sounds: Normal breath sounds. No wheezing or rhonchi.   Skin:     General: Skin is warm.      Findings: No erythema or rash.   Neurological:      General: No focal deficit present.      Mental Status: He is alert. Mental status is at baseline.   Psychiatric:         Mood and Affect: Mood normal.         Behavior: Behavior normal.      ASSESSMENT/PLAN:  José Augustine is a 48 year old male seen today for ongoing evaluation of asthma as well as allergic rhinitis.  He is known to have Alternaria allergy and ragweed allergy.  He also sees ENT and is on sublingual immunotherapy.  Spirometry was normal in September 2022.  His dose of Advair was reduced at the last appointment with very mild symptoms and he would prefer to be on the lower dose.    We will check a spirometry in 2 months which will be approximately 1 year from his last assessment.  I would expect it to be similar to previous  but if it has reduced may consider changing his inhaler at that time.  It is preferable to be on the lowest dose of inhaled steroids possible.    Advair 100 mcg 1 puff twice daily to continue    He will continue with the Allegra as needed which he is currently finding to be beneficial at the spring.    Albuterol continue 2 puffs every 4 hours as needed    Follow-up in 6 months, then may follow-up yearly      Thank you for allowing me to participate in the care of José Augustine.      I spent 30 minutes on the date of the encounter doing chart review, history and exam, documentation and further coordination as noted above exclusive of separately reported interpretations    Sergio Dorantes MD  Allergy/Immunology  [facility]

## 2023-06-02 NOTE — LETTER
6/2/2023         RE: José Augustine  [address] [facility] [address]        Dear Colleague,    Thank you for referring your patient, José Augustine, to the [facility] [place]. Please see a copy of my visit note below.    José Augustine was seen in the Allergy Clinic at [facility].    José Augustine is a 48 year old male being seen today for ongoing evaluation of seasonal allergic rhinitis due to pollen as well as mild persistent asthma.  Blood testing was positive to Alternaria and mildly to ragweed.  He does receive sublingual immunotherapy from his ENT provider who gets these from [facility].    Spirometry performed September 2022 was normal.  FEV1 was 97%.  At the last appointment he reduced his Advair dose from 250 mcg to 100 mcg 1 puff twice daily.  He noticed subtle symptoms of chest tightness however he has not required any albuterol since the switch.  He prefers to be on the lower dose.    He has had springtime allergy symptoms for which he started Allegra.  Typically only needed Allegra in the fall.  He has found the Allegra to be beneficial.    Since the last visit the patient has been feeling good.    Past Medical History:   Diagnosis Date     Allergic state      Asthma age 12     Eczema      Papillary thyroid carcinoma (H) 12/9/13    right 1.5 cm, ETE, LN+, BRAF+     Postsurgical hypothyroidism 12/9/13     Thyroid nodule      Family History   Problem Relation Age of Onset     Cancer Father         skin     Diabetes Other      Thyroid Cancer No family hx of      Thyroid Disease No family hx of      Past Surgical History:   Procedure Laterality Date     ORTHOPEDIC SURGERY      shoulder labral repair     THYROIDECTOMY  12/9/2013    Procedure: THYROIDECTOMY;  Total Thyroidectomy, Central Neck Dissection ;  Surgeon: Pillo Henley MD;  [facility]: [facility]         Current Outpatient Medications:      albuterol (PROAIR HFA/PROVENTIL HFA/VENTOLIN HFA) 108 (90 Base)  MCG/ACT inhaler, Inhale 2 puffs into the lungs every 6 hours as needed for shortness of breath / dyspnea or wheezing, Disp: 18 g, Rfl: 3     fexofenadine (ALLEGRA) 180 MG tablet, Take 180 mg by mouth daily, Disp: , Rfl:      fluticasone-salmeterol (ADVAIR) 100-50 MCG/ACT inhaler, Inhale 1 puff into the lungs every 12 hours, Disp: 1 each, Rfl: 4     levothyroxine (SYNTHROID/LEVOTHROID) 125 MCG tablet, TAKE 2 TABS BY MOUTH DAILY ON MON TO SAT AND 1.5 TABS BY MOUTH DAILY ON SUN, Disp: 162 tablet, Rfl: 4     Probiotic Product (PROBIOTIC ADVANCED PO), , Disp: , Rfl:   No Known Allergies      EXAM:   /82   Pulse 63   Wt 109.6 kg (241 lb 11.2 oz)   SpO2 97%   BMI 26.27 kg/m      Constitutional:       General: He is not in acute distress.     Appearance: Normal appearance. He is not ill-appearing.   HENT:      Head: Normocephalic and atraumatic.      Nose: Nose normal. No congestion or rhinorrhea.      Mouth/Throat:      Mouth: Mucous membranes are moist.      Pharynx: Oropharynx is clear. No posterior oropharyngeal erythema.   Eyes:      General:         Right eye: No discharge.         Left eye: No discharge.   Cardiovascular:      Rate and Rhythm: Normal rate and regular rhythm.      Heart sounds: Normal heart sounds.   Pulmonary:      Effort: Pulmonary effort is normal.      Breath sounds: Normal breath sounds. No wheezing or rhonchi.   Skin:     General: Skin is warm.      Findings: No erythema or rash.   Neurological:      General: No focal deficit present.      Mental Status: He is alert. Mental status is at baseline.   Psychiatric:         Mood and Affect: Mood normal.         Behavior: Behavior normal.      ASSESSMENT/PLAN:  José Augustine is a 48 year old male seen today for ongoing evaluation of asthma as well as allergic rhinitis.  He is known to have Alternaria allergy and ragweed allergy.  He also sees ENT and is on sublingual immunotherapy.  Spirometry was normal in September 2022.  His dose of  Advair was reduced at the last appointment with very mild symptoms and he would prefer to be on the lower dose.    We will check a spirometry in 2 months which will be approximately 1 year from his last assessment.  I would expect it to be similar to previous but if it has reduced may consider changing his inhaler at that time.  It is preferable to be on the lowest dose of inhaled steroids possible.    Advair 100 mcg 1 puff twice daily to continue    He will continue with the Allegra as needed which he is currently finding to be beneficial at the spring.    Albuterol continue 2 puffs every 4 hours as needed    Follow-up in 6 months, then may follow-up yearly      Thank you for allowing me to participate in the care of José Augustine.      I spent 30 minutes on the date of the encounter doing chart review, history and exam, documentation and further coordination as noted above exclusive of separately reported interpretations    Sergio Dorantes MD  Allergy/Immunology  [facility]      Again, thank you for allowing me to participate in the care of your patient.        Sincerely,        Sergio Dorantes MD

## 2023-06-02 NOTE — PATIENT INSTRUCTIONS
Allegra as needed  Advair 100 mcg 1 puff twice daily    Allergy Staff Appt Hours Shot Hours Location       Physician   Sergio Dorantes MD      Support Staff   Tara KAHN, RN   Kunal APONTE, RN   Angel SANCHEZ, ANGELES MEZA, JHONY      Mondays Tuesdays Thursdays and Fridays:  Estefania 7-5 Wednesdays  Close                Mondays, Tuesdays and Fridays:  7:40 - 3:20              [facility]  [address]  [place] [address]  Appt Line: [phone]    Pulmonary Function Scheduling:  [place]: [phone]

## 2023-06-26 ENCOUNTER — LAB (OUTPATIENT)
Dept: LAB | Facility: CLINIC | Age: 48
End: 2023-06-26
Payer: COMMERCIAL

## 2023-06-26 DIAGNOSIS — R06.00 DYSPNEA, UNSPECIFIED TYPE: ICD-10-CM

## 2023-06-26 DIAGNOSIS — E89.0 POSTSURGICAL HYPOTHYROIDISM: ICD-10-CM

## 2023-06-26 DIAGNOSIS — C73 PAPILLARY THYROID CARCINOMA (H): ICD-10-CM

## 2023-06-26 LAB
BASOPHILS # BLD AUTO: 0 10E3/UL (ref 0–0.2)
BASOPHILS NFR BLD AUTO: 1 %
EOSINOPHIL # BLD AUTO: 0.1 10E3/UL (ref 0–0.7)
EOSINOPHIL NFR BLD AUTO: 1 %
ERYTHROCYTE [DISTWIDTH] IN BLOOD BY AUTOMATED COUNT: 12.3 % (ref 10–15)
HCT VFR BLD AUTO: 44.2 % (ref 40–53)
HGB BLD-MCNC: 14.6 G/DL (ref 13.3–17.7)
IMM GRANULOCYTES # BLD: 0 10E3/UL
IMM GRANULOCYTES NFR BLD: 0 %
LYMPHOCYTES # BLD AUTO: 1.7 10E3/UL (ref 0.8–5.3)
LYMPHOCYTES NFR BLD AUTO: 33 %
MCH RBC QN AUTO: 30.2 PG (ref 26.5–33)
MCHC RBC AUTO-ENTMCNC: 33 G/DL (ref 31.5–36.5)
MCV RBC AUTO: 92 FL (ref 78–100)
MONOCYTES # BLD AUTO: 0.4 10E3/UL (ref 0–1.3)
MONOCYTES NFR BLD AUTO: 9 %
NEUTROPHILS # BLD AUTO: 2.9 10E3/UL (ref 1.6–8.3)
NEUTROPHILS NFR BLD AUTO: 56 %
PLATELET # BLD AUTO: 187 10E3/UL (ref 150–450)
RBC # BLD AUTO: 4.83 10E6/UL (ref 4.4–5.9)
T4 FREE SERPL-MCNC: 1.56 NG/DL (ref 0.9–1.7)
TSH SERPL DL<=0.005 MIU/L-ACNC: 0.79 UIU/ML (ref 0.3–4.2)
WBC # BLD AUTO: 5.2 10E3/UL (ref 4–11)

## 2023-06-26 PROCEDURE — 36415 COLL VENOUS BLD VENIPUNCTURE: CPT

## 2023-06-26 PROCEDURE — 85025 COMPLETE CBC W/AUTO DIFF WBC: CPT

## 2023-06-26 PROCEDURE — 84432 ASSAY OF THYROGLOBULIN: CPT | Mod: 90

## 2023-06-26 PROCEDURE — 84439 ASSAY OF FREE THYROXINE: CPT

## 2023-06-26 PROCEDURE — 99000 SPECIMEN HANDLING OFFICE-LAB: CPT

## 2023-06-26 PROCEDURE — 84443 ASSAY THYROID STIM HORMONE: CPT

## 2023-06-26 PROCEDURE — 86800 THYROGLOBULIN ANTIBODY: CPT | Mod: 90

## 2023-06-26 NOTE — RESULT ENCOUNTER NOTE
Hello,    The labs show a normal complete blood count no signs of anemia  The thyroid levels looked very good as well  The thyroglobulin and antibody levels are still pending, stay tuned for these.      Angela Allen MD

## 2023-06-30 LAB — SCANNED LAB RESULT: NORMAL

## 2023-07-24 ENCOUNTER — VIRTUAL VISIT (OUTPATIENT)
Dept: ENDOCRINOLOGY | Facility: CLINIC | Age: 48
End: 2023-07-24
Payer: COMMERCIAL

## 2023-07-24 VITALS — WEIGHT: 235 LBS | BODY MASS INDEX: 25.54 KG/M2

## 2023-07-24 DIAGNOSIS — C73 PAPILLARY THYROID CARCINOMA (H): ICD-10-CM

## 2023-07-24 DIAGNOSIS — E89.0 POSTSURGICAL HYPOTHYROIDISM: Primary | ICD-10-CM

## 2023-07-24 PROCEDURE — 99214 OFFICE O/P EST MOD 30 MIN: CPT | Mod: VID

## 2023-07-24 RX ORDER — LEVOTHYROXINE SODIUM 125 UG/1
TABLET ORAL
Qty: 162 TABLET | Refills: 4 | Status: SHIPPED | OUTPATIENT
Start: 2023-07-24 | End: 2024-07-29

## 2023-07-24 ASSESSMENT — PAIN SCALES - GENERAL: PAINLEVEL: NO PAIN (0)

## 2023-07-24 NOTE — PROGRESS NOTES
Endocrinology video visit    Assessment/plan  1. papillary thyroid carcinoma, 1.5 cm with extrathyroid extension, node positive, BRAF mutant positive.  He has been treated with surgery, 100 mCi 131I.    pT3, pN1a, pMx, cM0  JESUS recurrence risk intermediate  See me yearly with labs about 2 weeks prior: Tg, TSH, free T4   Neck neck US 2024 just before appt then.      2.  Post surgical hypothyroidism.    Treat to TSH < 0.4. .   He is currently using 13.5 of the 125 mcg tablets/week (mean 241 mcg/day over the course of a week)  Rx refilled.    No change in dose.     Due to the continued risks of COVID 19 transmission and to improve ease of access this visit was a tvideo visit. The patient gave verbal consent for the visit today. 1   I have independently reviewed and interpreted labs, imaging as indicated.     Distant Location (provider location):  Off-site  Mode of Communication:  Video Conference via 2sms  Chart review/prep time 1  [id number]  Visit Start time  1501   Visit Stop time  1504   15__ minutes spent on the date of the encounter doing chart review, history and exam, documentation and further activities as noted above.    Nettie Carolina MD      Cc/ HISTORY OF PRESENT ILLNESS José presents for follow up of + BRAF papillary thyroid cancer, post surgical hypothyroidism.  He had + BRAF mutation on the FNAB specimen. He was last seen by me 5/2022.  At that time he was on  13.5 tablets/week, using the 125 mcg tablets (241 mcg/day average).  He continues on this dose.  He already had labs in anticipation of this appt    The  thyroid cancer treatment course has been as follows:   12/9/13 Total thyroidectomy, central neck dissection removing right PCT 1.5 cm, extending to extrathyroidal fatty tissue (foca ETE).  6/10 level 6 LN were positive for PCT.   MACIS:Total 4.55 if invasion and no distant  pT3, pN1a, pMx  8/13/14 131I 100 mCi after 2 dose Thyrogen stimulation.    11/12/15 thyrogen stimulated 3 mCi  123I TBS  negative. Day 5 thyrogen stimulated Tg was  048 with TSH 16.4.       We have the following relevant lab data:  6/30/11 TSh 0.99  10/3/12: vitamin D 16.4  9/4/13: TSh 1.07, calcium 9.1, BUN17, creatinin0.85, alk phos 54, vitamin D 41.7  1/21/14: Tg 0.48, JESUS < 0.4, TSH 9.77  5/20/14: Tg 0.31, JESUS < 0.4, TSH1.02  8/13/14: Tg 0.89, JESUS < 0.4,  -  8/18/14 post therapy scan:  neck uptake  10/14/14: Tg 0.14, JESUS < 0.4, TSH 1.19  12/19/14 TSH 0.3, free T4 1.39  4/14/15: Tg 0.17, JESUS < 0.4, TSH 0.07  11/13/15: Tg 0.48, JESUS < 0.4, TSH 16,40  4/18/16: Tg 0.14, JESUS < 0.4,  12/20/17: Tg 0.16, JESUS < 0.4, TSH 0.02, free T4 1.57-   2/4/19: Tg 0.14, JESUS < 0.4, TSH 1.4, free T4 1.02 -   2/4/2020 Tg 0.28, JESUS < 0.4- TSH 0.75, free T4 1.14  12/8/2020: Tg 0.12, JESUS < 0.4, TSH 0.09, free T4 1.22, Ca 8.4, creatinine 1.02.    6/26/23 Tg 0.18, JESUS < 0.4, TSh 0.79, free T4 1.56-    2/6/2020: neck US compared with 12/26/17  Right level 4 # 1 0.4 x 0.6 x 0.8 cm - not seen 2017 but cine not comparable.   Right level 6 # 1 0.3 x  0.3 x 0.6 cm - ? Not seen 2017  4/15/2022 neck US compared with 2/6/2020:, 12/26/17  Right level 4 # 1 0.3 x 0.6 x 0.8 cm was 0.4 x 0.6 x 0.8 cm - not seen 2017 but cine not comparable.   Right level 6 # 1 not seen  -- was 0.3 x  0.3 x 0.6 cm - ? Not seen 2017    REVIEW OF SYSTEMS  Weight up a bit - between 235 and 240, which is high for m   Feels OK  Energy good  Sleep OK  Cardiac: negative  Respiratory:  Negative   GI: negative  No pains     Past Medical History  Past Medical History:   Diagnosis Date     Allergic state      Asthma age 12     Eczema      Papillary thyroid carcinoma (H) 12/9/13    right 1.5 cm, ETE, LN+, BRAF+     Postsurgical hypothyroidism 12/9/13     Thyroid nodule        Past Surgical History:   Procedure Laterality Date     ORTHOPEDIC SURGERY      shoulder labral repair     THYROIDECTOMY  12/9/2013    Procedure: THYROIDECTOMY;  Total Thyroidectomy, Central Neck Dissection ;   "Surgeon: Pillo Henley MD;  [facility]       Medications  Current Outpatient Medications   Medication Sig Dispense Refill     albuterol (PROAIR HFA/PROVENTIL HFA/VENTOLIN HFA) 108 (90 Base) MCG/ACT inhaler Inhale 2 puffs into the lungs every 6 hours as needed for shortness of breath / dyspnea or wheezing 18 g 3     fexofenadine (ALLEGRA) 180 MG tablet Take 180 mg by mouth daily       fluticasone-salmeterol (ADVAIR) 100-50 MCG/ACT inhaler Inhale 1 puff into the lungs every 12 hours 1 each 4     levothyroxine (SYNTHROID/LEVOTHROID) 125 MCG tablet TAKE 2 TABS BY MOUTH DAILY ON MON TO SAT AND 1.5 TABS BY MOUTH DAILY ON  tablet 4     Probiotic Product (PROBIOTIC ADVANCED PO)        Allergies  No Known Allergies    Family History  Family History   Problem Relation Age of Onset     Cancer Father         skin     Diabetes Other      Thyroid Cancer No family hx of      Thyroid Disease No family hx of      Social History  Social History     Tobacco Use     Smoking status: Never     Smokeless tobacco: Never   Substance Use Topics     Alcohol use: Yes     Comment: weekly; 4 x a week, 1-2 glass of wine     Drug use: No      works from home .   A bit of exercise- walks some, works out with  once/week.      Physical Exam  There were no vitals taken for this visit.  There is no height or weight on file to calculate BMI.   BP Readings from Last 1 Encounters:   06/02/23 133/82      Pulse Readings from Last 1 Encounters:   06/02/23 63      Resp Readings from Last 1 Encounters:   07/13/22 18      Temp Readings from Last 1 Encounters:   07/13/22 96.8  F (36  C) (Tympanic)      SpO2 Readings from Last 1 Encounters:   06/02/23 97%      Wt Readings from Last 1 Encounters:   06/02/23 109.6 kg (241 lb 11.2 oz)      Ht Readings from Last 1 Encounters:   04/13/22 2.043 m (6' 8.43\")     GENERAL: pleasant  man in  no distress, sitting at desk  SKIN: Visible skin clear  EYES: Eyes grossly normal to inspection.    NECK: no " visible masses;   RESP: No audible wheeze, cough.  No  increased work of breathing.    NEURO: Awake, alert, responds appropriately to questions.  Mentation and speech fluent.  PSYCH:affect normal, and appearance well-groomed.    DATA REVIEW     Latest Ref Rng 2/4/2020  2:34 PM 12/8/2020  8:49 AM 6/29/2022  2:03 PM 6/26/2023  9:45 AM   ENDO THYROID LABS-[facility]        TSH 0.40 - 4.00 mU/L 0.75  0.09 (L)  0.19 (L)     TSH 0.30 - 4.20 uIU/mL    0.79    FREE T4 0.90 - 1.70 ng/dL 1.14  1.22  1.27  1.56       Legend:  (L) Low

## 2023-07-24 NOTE — NURSING NOTE
Is the patient currently in the state of [place]? YES    Visit mode:VIDEO    If the visit is dropped, the patient can be reconnected by: VIDEO VISIT: Send to e-mail at: [email or web address]    Will anyone else be joining the visit? NO      How would you like to obtain your AVS? MyChart    Are changes needed to the allergy or medication list? NO    Reason for visit: Follow Up

## 2023-07-24 NOTE — PATIENT INSTRUCTIONS
Yearly labs about 2 weeks prior to next appt    See me approximately yearly    Next neck US just before next appt in 2024  ([id number] to schedule)_.

## 2023-07-24 NOTE — LETTER
7/24/2023       RE: José Augustine  [address] [facility] [address]     Dear Colleague,    Thank you for referring your patient, José Augustine, to the [facility] ENDOCRINOLOGY CLINIC [employer] [facility]. Please see a copy of my visit note below.    Endocrinology video visit    Assessment/plan  1. papillary thyroid carcinoma, 1.5 cm with extrathyroid extension, node positive, BRAF mutant positive.  He has been treated with surgery, 100 mCi 131I.    pT3, pN1a, pMx, cM0  JESUS recurrence risk intermediate  See me yearly with labs about 2 weeks prior: Tg, TSH, free T4   Neck neck US 2024 just before appt then.      2.  Post surgical hypothyroidism.    Treat to TSH < 0.4. .   He is currently using 13.5 of the 125 mcg tablets/week (mean 241 mcg/day over the course of a week)  Rx refilled.    No change in dose.     Due to the continued risks of COVID 19 transmission and to improve ease of access this visit was a tvideo visit. The patient gave verbal consent for the visit today. 1   I have independently reviewed and interpreted labs, imaging as indicated.     Distant Location (provider location):  Off-site  Mode of Communication:  Video Conference via Tunespeak  Chart review/prep time 1  [id number]  Visit Start time  1501   Visit Stop time  1504   15__ minutes spent on the date of the encounter doing chart review, history and exam, documentation and further activities as noted above.    Nettie Carolina MD      Cc/ HISTORY OF PRESENT ILLNESS José presents for follow up of + BRAF papillary thyroid cancer, post surgical hypothyroidism.  He had + BRAF mutation on the FNAB specimen. He was last seen by me 5/2022.  At that time he was on  13.5 tablets/week, using the 125 mcg tablets (241 mcg/day average).  He continues on this dose.  He already had labs in anticipation of this appt    The  thyroid cancer treatment course has been as follows:   12/9/13 Total  thyroidectomy, central neck dissection removing right PCT 1.5 cm, extending to extrathyroidal fatty tissue (foca ETE).  6/10 level 6 LN were positive for PCT.   MACIS:Total 4.55 if invasion and no distant  pT3, pN1a, pMx  8/13/14 131I 100 mCi after 2 dose Thyrogen stimulation.    11/12/15 thyrogen stimulated 3 mCi 123I TBS  negative. Day 5 thyrogen stimulated Tg was  048 with TSH 16.4.       We have the following relevant lab data:  6/30/11 TSh 0.99  10/3/12: vitamin D 16.4  9/4/13: TSh 1.07, calcium 9.1, BUN17, creatinin0.85, alk phos 54, vitamin D 41.7  1/21/14: Tg 0.48, JESUS < 0.4, TSH 9.77  5/20/14: Tg 0.31, JESUS < 0.4, TSH1.02  8/13/14: Tg 0.89, JESUS < 0.4,  -  8/18/14 post therapy scan:  neck uptake  10/14/14: Tg 0.14, JESUS < 0.4, TSH 1.19  12/19/14 TSH 0.3, free T4 1.39  4/14/15: Tg 0.17, JESUS < 0.4, TSH 0.07  11/13/15: Tg 0.48, JESUS < 0.4, TSH 16,40  4/18/16: Tg 0.14, JESUS < 0.4,  12/20/17: Tg 0.16, JESUS < 0.4, TSH 0.02, free T4 1.57-   2/4/19: Tg 0.14, JESUS < 0.4, TSH 1.4, free T4 1.02 -   2/4/2020 Tg 0.28, JESUS < 0.4- TSH 0.75, free T4 1.14  12/8/2020: Tg 0.12, JESUS < 0.4, TSH 0.09, free T4 1.22, Ca 8.4, creatinine 1.02.    6/26/23 Tg 0.18, JESUS < 0.4, TSh 0.79, free T4 1.56-    2/6/2020: neck US compared with 12/26/17  Right level 4 # 1 0.4 x 0.6 x 0.8 cm - not seen 2017 but cine not comparable.   Right level 6 # 1 0.3 x  0.3 x 0.6 cm - ? Not seen 2017  4/15/2022 neck US compared with 2/6/2020:, 12/26/17  Right level 4 # 1 0.3 x 0.6 x 0.8 cm was 0.4 x 0.6 x 0.8 cm - not seen 2017 but cine not comparable.   Right level 6 # 1 not seen  -- was 0.3 x  0.3 x 0.6 cm - ? Not seen 2017    REVIEW OF SYSTEMS  Weight up a bit - between 235 and 240, which is high for m   Feels OK  Energy good  Sleep OK  Cardiac: negative  Respiratory:  Negative   GI: negative  No pains     Past Medical History  Past Medical History:   Diagnosis Date    Allergic state     Asthma age 12    Eczema     Papillary thyroid carcinoma (H) 12/9/13     right 1.5 cm, ETE, LN+, BRAF+    Postsurgical hypothyroidism 12/9/13    Thyroid nodule        Past Surgical History:   Procedure Laterality Date    ORTHOPEDIC SURGERY      shoulder labral repair    THYROIDECTOMY  12/9/2013    Procedure: THYROIDECTOMY;  Total Thyroidectomy, Central Neck Dissection ;  Surgeon: Pillo Henley MD;  Location:  OR       Medications  Current Outpatient Medications   Medication Sig Dispense Refill    albuterol (PROAIR HFA/PROVENTIL HFA/VENTOLIN HFA) 108 (90 Base) MCG/ACT inhaler Inhale 2 puffs into the lungs every 6 hours as needed for shortness of breath / dyspnea or wheezing 18 g 3    fexofenadine (ALLEGRA) 180 MG tablet Take 180 mg by mouth daily      fluticasone-salmeterol (ADVAIR) 100-50 MCG/ACT inhaler Inhale 1 puff into the lungs every 12 hours 1 each 4    levothyroxine (SYNTHROID/LEVOTHROID) 125 MCG tablet TAKE 2 TABS BY MOUTH DAILY ON MON TO SAT AND 1.5 TABS BY MOUTH DAILY ON  tablet 4    Probiotic Product (PROBIOTIC ADVANCED PO)        Allergies  No Known Allergies    Family History  Family History   Problem Relation Age of Onset    Cancer Father         skin    Diabetes Other     Thyroid Cancer No family hx of     Thyroid Disease No family hx of      Social History  Social History     Tobacco Use    Smoking status: Never    Smokeless tobacco: Never   Substance Use Topics    Alcohol use: Yes     Comment: weekly; 4 x a week, 1-2 glass of wine    Drug use: No      works from home .   A bit of exercise- walks some, works out with  once/week.      Physical Exam  There were no vitals taken for this visit.  There is no height or weight on file to calculate BMI.   BP Readings from Last 1 Encounters:   06/02/23 133/82      Pulse Readings from Last 1 Encounters:   06/02/23 63      Resp Readings from Last 1 Encounters:   07/13/22 18      Temp Readings from Last 1 Encounters:   07/13/22 96.8  F (36  C) (Tympanic)      SpO2 Readings from Last 1 Encounters:   06/02/23 97%     "  Wt Readings from Last 1 Encounters:   06/02/23 109.6 kg (241 lb 11.2 oz)      Ht Readings from Last 1 Encounters:   04/13/22 2.043 m (6' 8.43\")     GENERAL: pleasant  man in  no distress, sitting at desk  SKIN: Visible skin clear  EYES: Eyes grossly normal to inspection.    NECK: no visible masses;   RESP: No audible wheeze, cough.  No  increased work of breathing.    NEURO: Awake, alert, responds appropriately to questions.  Mentation and speech fluent.  PSYCH:affect normal, and appearance well-groomed.    DATA REVIEW     Latest Ref Rng 2/4/2020  2:34 PM 12/8/2020  8:49 AM 6/29/2022  2:03 PM 6/26/2023  9:45 AM   ENDO THYROID LABS-P        TSH 0.40 - 4.00 mU/L 0.75  0.09 (L)  0.19 (L)     TSH 0.30 - 4.20 uIU/mL    0.79    FREE T4 0.90 - 1.70 ng/dL 1.14  1.22  1.27  1.56       Legend:  (L) Low      Virtual Visit Details    Type of service:  Video Visit       Again, thank you for allowing me to participate in the care of your patient.      Sincerely,    Nettie Carolina MD      "

## 2023-08-01 ENCOUNTER — OFFICE VISIT (OUTPATIENT)
Dept: PULMONOLOGY | Facility: CLINIC | Age: 48
End: 2023-08-01
Payer: COMMERCIAL

## 2023-08-01 DIAGNOSIS — J45.30 MILD PERSISTENT ASTHMA WITHOUT COMPLICATION: ICD-10-CM

## 2023-08-01 PROCEDURE — 94060 EVALUATION OF WHEEZING: CPT | Performed by: INTERNAL MEDICINE

## 2023-08-02 LAB
EXPTIME-PRE: 10.85 SEC
FEF2575-%PRED-POST: 91 %
FEF2575-%PRED-PRE: 80 %
FEF2575-POST: 4.03 L/SEC
FEF2575-PRE: 3.55 L/SEC
FEF2575-PRED: 4.42 L/SEC
FEFMAX-%PRED-PRE: 110 %
FEFMAX-PRE: 13.44 L/SEC
FEFMAX-PRED: 12.16 L/SEC
FEV1-%PRED-PRE: 109 %
FEV1-PRE: 5.4 L
FEV1FEV6-PRE: 72 %
FEV1FEV6-PRED: 81 %
FEV1FVC-PRE: 69 %
FEV1FVC-PRED: 79 %
FIFMAX-PRE: 11.43 L/SEC
FVC-%PRED-PRE: 123 %
FVC-PRE: 7.79 L
FVC-PRED: 6.29 L

## 2023-08-29 ENCOUNTER — TELEPHONE (OUTPATIENT)
Dept: ENDOCRINOLOGY | Facility: CLINIC | Age: 48
End: 2023-08-29
Payer: COMMERCIAL

## 2023-08-29 NOTE — TELEPHONE ENCOUNTER
Talk to patient scheduled follow up for next year 7/29 virtual and labs two weeks prior to appt. Patient stated he would like to call for the Ultrasound appointment. Phone was provided to patient.    Padma Payan on 8/29/2023 at 10:58 AM

## 2023-10-13 DIAGNOSIS — J45.30 MILD PERSISTENT ASTHMA WITHOUT COMPLICATION: ICD-10-CM

## 2023-10-13 RX ORDER — FLUTICASONE PROPIONATE AND SALMETEROL 100; 50 UG/1; UG/1
1 POWDER RESPIRATORY (INHALATION) EVERY 12 HOURS
Qty: 1 EACH | Refills: 4 | Status: SHIPPED | OUTPATIENT
Start: 2023-10-13

## 2023-10-13 NOTE — TELEPHONE ENCOUNTER
"Requested Prescriptions   Pending Prescriptions Disp Refills    fluticasone-salmeterol (ADVAIR) 100-50 MCG/ACT inhaler 1 each 4     Sig: Inhale 1 puff into the lungs every 12 hours       Inhaled Steroids Protocol Passed - 10/13/2023  8:50 AM        Passed - Patient is age 12 or older        Passed - Asthma control assessment score within normal limits in last 6 months     Please review ACT score.           Passed - Medication is active on med list        Passed - Recent (6 mo) or future (30 days) visit within the authorizing provider's specialty     Patient had office visit in the last 6 months or has a visit in the next 30 days with authorizing provider or within the authorizing provider's specialty.  See \"Patient Info\" tab in inbasket, or \"Choose Columns\" in Meds & Orders section of the refill encounter.           Long-Acting Beta Agonist Inhalers Protocol  Passed - 10/13/2023  8:50 AM        Passed - Patient is age 12 or older        Passed - Asthma control assessment score within normal limits in last 6 months     Please review ACT score.           Passed - Medication is active on med list        Passed - Recent (6 mo) or future (30 days) visit within the authorizing provider's specialty     Patient had office visit in the last 6 months or has a visit in the next 30 days with authorizing provider or within the authorizing provider's specialty.  See \"Patient Info\" tab in inbasket, or \"Choose Columns\" in Meds & Orders section of the refill encounter.                 Filling Rx as patient has a follow-up in December with Dr. Dorantes.     Tara Valladares, ASHLIN, RN   "

## 2023-10-13 NOTE — TELEPHONE ENCOUNTER
Requested Prescriptions   Pending Prescriptions Disp Refills    fluticasone-salmeterol (ADVAIR) 100-50 MCG/ACT inhaler 1 each 4     Sig: Inhale 1 puff into the lungs every 12 hours       There is no refill protocol information for this order        Last Written Prescription Date:  06/02/2023  Last Fill Quantity: 1 each,  # refills: 4   Last office visit: 6/2/2023 ; last virtual visit: Visit date not found with prescribing provider:  Sergio Dorantes MD    Future Office Visit:  12/08/2023  Next 5 appointments (look out 90 days)      Dec 08, 2023 10:00 AM  Return Visit with Sergio Dorantes MD  [facility] [place] ([facility] ) [address] [place] [id number] [phone]

## 2023-11-21 ENCOUNTER — E-VISIT (OUTPATIENT)
Dept: URGENT CARE | Facility: CLINIC | Age: 48
End: 2023-11-21
Payer: COMMERCIAL

## 2023-11-21 DIAGNOSIS — R21 RASH AND NONSPECIFIC SKIN ERUPTION: Primary | ICD-10-CM

## 2023-11-21 PROCEDURE — 99207 PR NON-BILLABLE SERV PER CHARTING: CPT | Performed by: EMERGENCY MEDICINE

## 2023-11-21 NOTE — PATIENT INSTRUCTIONS
Dear José Augustine,    We are sorry you are not feeling well. Based on the responses you provided, it is recommended that you be seen in-person in urgent care so we can better evaluate your symptoms. Please click here to find the nearest urgent care location to you.   You will not be charged for this Visit. Thank you for trusting us with your care.    Osiel Lynn MD

## 2023-12-08 ENCOUNTER — OFFICE VISIT (OUTPATIENT)
Dept: ALLERGY | Facility: CLINIC | Age: 48
End: 2023-12-08
Payer: COMMERCIAL

## 2023-12-08 VITALS
SYSTOLIC BLOOD PRESSURE: 116 MMHG | DIASTOLIC BLOOD PRESSURE: 75 MMHG | BODY MASS INDEX: 26.95 KG/M2 | RESPIRATION RATE: 16 BRPM | WEIGHT: 248 LBS | OXYGEN SATURATION: 99 % | HEART RATE: 65 BPM

## 2023-12-08 DIAGNOSIS — J45.30 MILD PERSISTENT ASTHMA WITHOUT COMPLICATION: ICD-10-CM

## 2023-12-08 DIAGNOSIS — J30.1 SEASONAL ALLERGIC RHINITIS DUE TO POLLEN: ICD-10-CM

## 2023-12-08 DIAGNOSIS — J30.89 OTHER ALLERGIC RHINITIS: ICD-10-CM

## 2023-12-08 DIAGNOSIS — L20.84 INTRINSIC ATOPIC DERMATITIS: Primary | ICD-10-CM

## 2023-12-08 PROCEDURE — 99214 OFFICE O/P EST MOD 30 MIN: CPT | Performed by: INTERNAL MEDICINE

## 2023-12-08 RX ORDER — FLUTICASONE PROPIONATE AND SALMETEROL 100; 50 UG/1; UG/1
1 POWDER RESPIRATORY (INHALATION) EVERY 12 HOURS
Qty: 3 EACH | Refills: 3 | Status: SHIPPED | OUTPATIENT
Start: 2023-12-08

## 2023-12-08 RX ORDER — TRIAMCINOLONE ACETONIDE 1 MG/G
OINTMENT TOPICAL 2 TIMES DAILY
Qty: 30 G | Refills: 1 | Status: SHIPPED | OUTPATIENT
Start: 2023-12-08

## 2023-12-08 NOTE — LETTER
12/8/2023         RE: José Augustine  [address] [facility] [address]        Dear Colleague,    Thank you for referring your patient, José Augustine, to the [facility] [place]. Please see a copy of my visit note below.    José Augustine was seen in the Allergy Clinic at [facility].    José Augustine is a 48 year old male being seen today for ongoing evaluation of seasonal allergic rhinitis due to pollen as well as mild persistent asthma.  Also has concerns for atopic dermatitis.    Since the last visit the patient has been doing very well.  For his seasonal allergy symptoms he was receiving sublingual immunotherapy from [facility].  He did this for approximately 18 months and did not notice significant change.  This April through October he used Allegra on a daily basis that he did find to be beneficial.    For his asthma his Advair was reduced from the medium dose inhaler to the low-dose inhaler and initially he noticed some subtle lung changes.  He felt some mild chest tightness.  His lung function via spirometry was assessed and his lung function actually improved from 1 year ago.  FEV1 was 109% predicted % of predicted.      He has not used any albuterol or prednisone since last seen.  He is not complaining of any shortness of breath or chest tightness.  Overall he is happy with his asthma and allergy symptom control.    Over the last 6 months he has had increased symptoms of hand dermatitis.  He does get some eczema patches on his chest and ankle also.  He is has seen dermatology in the past.  He is wondering if he can get a refill of topical steroids.  Triamcinolone has worked well in the past.    Past Medical History:   Diagnosis Date     Allergic state      Asthma age 12     Eczema      Lyme disease 07/2021     Papillary thyroid carcinoma (H) 12/09/2013    right 1.5 cm, ETE, LN+, BRAF+     Postsurgical hypothyroidism 12/09/2013     Family History    Problem Relation Age of Onset     Cancer Father         skin     Diabetes Other      Thyroid Cancer No family hx of      Thyroid Disease No family hx of      Past Surgical History:   Procedure Laterality Date     ORTHOPEDIC SURGERY      shoulder labral repair     THYROIDECTOMY  12/9/2013    Procedure: THYROIDECTOMY;  Total Thyroidectomy, Central Neck Dissection ;  Surgeon: Pillo Henley MD;  Location:  OR         Current Outpatient Medications:      fluticasone-salmeterol (ADVAIR) 100-50 MCG/ACT inhaler, Inhale 1 puff into the lungs every 12 hours, Disp: 3 each, Rfl: 3     triamcinolone (KENALOG) 0.1 % external ointment, Apply topically 2 times daily, Disp: 30 g, Rfl: 1     albuterol (PROAIR HFA/PROVENTIL HFA/VENTOLIN HFA) 108 (90 Base) MCG/ACT inhaler, Inhale 2 puffs into the lungs every 6 hours as needed for shortness of breath / dyspnea or wheezing, Disp: 18 g, Rfl: 3     fexofenadine (ALLEGRA) 180 MG tablet, Take 180 mg by mouth daily, Disp: , Rfl:      fluticasone-salmeterol (ADVAIR) 100-50 MCG/ACT inhaler, Inhale 1 puff into the lungs every 12 hours, Disp: 1 each, Rfl: 4     levothyroxine (SYNTHROID/LEVOTHROID) 125 MCG tablet, MON to SAT 2 tablet/day; SUN 1.5 tablet, Disp: 162 tablet, Rfl: 4     Probiotic Product (PROBIOTIC ADVANCED PO), , Disp: , Rfl:   No Known Allergies      EXAM:   /75 (BP Location: Left arm, Patient Position: Sitting, Cuff Size: Adult Large)   Pulse 65   Resp 16   Wt 112.5 kg (248 lb)   SpO2 99%   BMI 26.95 kg/m      Constitutional:       General: He is not in acute distress.     Appearance: Normal appearance. He is not ill-appearing.   HENT:      Head: Normocephalic and atraumatic.      Nose: Nose normal. No congestion or rhinorrhea.      Mouth/Throat:      Mouth: Mucous membranes are moist.      Pharynx: Oropharynx is clear. No posterior oropharyngeal erythema.   Eyes:      General:         Right eye: No discharge.         Left eye: No discharge.   Cardiovascular:       Rate and Rhythm: Normal rate and regular rhythm.      Heart sounds: Normal heart sounds.   Pulmonary:      Effort: Pulmonary effort is normal.      Breath sounds: Normal breath sounds. No wheezing or rhonchi.   Skin:     General: Skin is warm.      Findings: He has eczematous changes on his right dorsal aspect of his hand, erythematous as well as scaly.    Neurological:      General: No focal deficit present.      Mental Status: He is alert. Mental status is at baseline.   Psychiatric:         Mood and Affect: Mood normal.         Behavior: Behavior normal.      WORKUP: Spirometry showed an obstructive airflow with normal FEV1 and FVC, this was performed in August.    ASSESSMENT/PLAN:  José Augustine is a 48 year old male seen today for ongoing evaluation of asthma as well as allergic rhinitis.  An additional concern is atopic dermatitis on his hand.    Advair will continue at 1 puff twice daily.  Triamcinolone was prescribed to use twice daily for 14 days and then potentially on weekends on the areas that flare such as his hand.  Will follow-up in 1 year.  Recommend to not restart the sublingual immunotherapy.  We briefly discussed allergy shots.  Since he is doing well with Allegra that we will continue as needed.    Thank you for allowing me to participate in the care of José Auugstine.      I spent 32 minutes on the date of the encounter doing chart review, history and exam, documentation and further coordination as noted above exclusive of separately reported interpretations    Sergio Dorantes MD  Allergy/Immunology  [facility]       Again, thank you for allowing me to participate in the care of your patient.        Sincerely,        Sergio Dorantes MD

## 2023-12-08 NOTE — PATIENT INSTRUCTIONS
Allergy Staff Appt Hours Shot Hours Location       Physician   Sergio Dorantes MD      Support Staff   MALICK Torres RN Carlos Q., MA Emily J., MA      Mondays Tuesdays Thursdays and Fridays:      [place] 7-5      Wednesdays         Close                Mondays, Tuesdays and Fridays:  7:20 - 3:40              [facility]  [address]  [place] [address]  Allergy appointment  line: [phone]    Pulmonary Function Scheduling:  [place]: [phone]           Questions about cost of your care  For questions about your cost of your visit, procedure, lab or imaging contact: [employer] Line [phone] or visit:  [email or web address]    Prescription Assistance  If you need assistance with your prescriptions (cost, coverage, etc) please contact: [employer] Prescription Assistance Program [phone]    Important Scheduling Information  All visits for food challenges, medication/drug allergy testing, and drug challenges MUST be scheduled through the allergy clinic nurse. Please contact them via [employer] or by calling the clinic at [phone] and asking to speak with an allergy nurse. They will provide additional information and instructions for the appointment. Discontinue oral antihistamines 7 days prior to the appointment. Discontinue nasal and ocular antihistamines 1 day prior to the appointment.    Appointments for skin testing: Appointment will last approximately 45 minutes.  Please call the appointment line for your clinic to schedule.  Discontinue oral antihistamines 7 days prior to the appointment.  Discontinue nasal and ocular antihistamines 1 days prior to appointment.    Thank you for trusting us with your care. Please feel free to contact us with any questions or concerns you may have.

## 2023-12-08 NOTE — PROGRESS NOTES
José Augustine was seen in the Allergy Clinic at [facility].    José Augustine is a 48 year old male being seen today for ongoing evaluation of seasonal allergic rhinitis due to pollen as well as mild persistent asthma.  Also has concerns for atopic dermatitis.    Since the last visit the patient has been doing very well.  For his seasonal allergy symptoms he was receiving sublingual immunotherapy from [facility].  He did this for approximately 18 months and did not notice significant change.  This April through October he used Allegra on a daily basis that he did find to be beneficial.    For his asthma his Advair was reduced from the medium dose inhaler to the low-dose inhaler and initially he noticed some subtle lung changes.  He felt some mild chest tightness.  His lung function via spirometry was assessed and his lung function actually improved from 1 year ago.  FEV1 was 109% predicted % of predicted.      He has not used any albuterol or prednisone since last seen.  He is not complaining of any shortness of breath or chest tightness.  Overall he is happy with his asthma and allergy symptom control.    Over the last 6 months he has had increased symptoms of hand dermatitis.  He does get some eczema patches on his chest and ankle also.  He is has seen dermatology in the past.  He is wondering if he can get a refill of topical steroids.  Triamcinolone has worked well in the past.    Past Medical History:   Diagnosis Date    Allergic state     Asthma age 12    Eczema     Lyme disease 07/2021    Papillary thyroid carcinoma (H) 12/09/2013    right 1.5 cm, ETE, LN+, BRAF+    Postsurgical hypothyroidism 12/09/2013     Family History   Problem Relation Age of Onset    Cancer Father         skin    Diabetes Other     Thyroid Cancer No family hx of     Thyroid Disease No family hx of      Past Surgical History:   Procedure Laterality Date    ORTHOPEDIC SURGERY      shoulder labral repair     THYROIDECTOMY  12/9/2013    Procedure: THYROIDECTOMY;  Total Thyroidectomy, Central Neck Dissection ;  Surgeon: Pillo Henley MD;  Location: [facility] OR         Current Outpatient Medications:     fluticasone-salmeterol (ADVAIR) 100-50 MCG/ACT inhaler, Inhale 1 puff into the lungs every 12 hours, Disp: 3 each, Rfl: 3    triamcinolone (KENALOG) 0.1 % external ointment, Apply topically 2 times daily, Disp: 30 g, Rfl: 1    albuterol (PROAIR HFA/PROVENTIL HFA/VENTOLIN HFA) 108 (90 Base) MCG/ACT inhaler, Inhale 2 puffs into the lungs every 6 hours as needed for shortness of breath / dyspnea or wheezing, Disp: 18 g, Rfl: 3    fexofenadine (ALLEGRA) 180 MG tablet, Take 180 mg by mouth daily, Disp: , Rfl:     fluticasone-salmeterol (ADVAIR) 100-50 MCG/ACT inhaler, Inhale 1 puff into the lungs every 12 hours, Disp: 1 each, Rfl: 4    levothyroxine (SYNTHROID/LEVOTHROID) 125 MCG tablet, MON to SAT 2 tablet/day; SUN 1.5 tablet, Disp: 162 tablet, Rfl: 4    Probiotic Product (PROBIOTIC ADVANCED PO), , Disp: , Rfl:   No Known Allergies      EXAM:   /75 (BP Location: Left arm, Patient Position: Sitting, Cuff Size: Adult Large)   Pulse 65   Resp 16   Wt 112.5 kg (248 lb)   SpO2 99%   BMI 26.95 kg/m      Constitutional:       General: He is not in acute distress.     Appearance: Normal appearance. He is not ill-appearing.   HENT:      Head: Normocephalic and atraumatic.      Nose: Nose normal. No congestion or rhinorrhea.      Mouth/Throat:      Mouth: Mucous membranes are moist.      Pharynx: Oropharynx is clear. No posterior oropharyngeal erythema.   Eyes:      General:         Right eye: No discharge.         Left eye: No discharge.   Cardiovascular:      Rate and Rhythm: Normal rate and regular rhythm.      Heart sounds: Normal heart sounds.   Pulmonary:      Effort: Pulmonary effort is normal.      Breath sounds: Normal breath sounds. No wheezing or rhonchi.   Skin:     General: Skin is warm.      Findings: He has eczematous  changes on his right dorsal aspect of his hand, erythematous as well as scaly.    Neurological:      General: No focal deficit present.      Mental Status: He is alert. Mental status is at baseline.   Psychiatric:         Mood and Affect: Mood normal.         Behavior: Behavior normal.      WORKUP: Spirometry showed an obstructive airflow with normal FEV1 and FVC, this was performed in August.    ASSESSMENT/PLAN:  José Augustine is a 48 year old male seen today for ongoing evaluation of asthma as well as allergic rhinitis.  An additional concern is atopic dermatitis on his hand.    Advair will continue at 1 puff twice daily.  Triamcinolone was prescribed to use twice daily for 14 days and then potentially on weekends on the areas that flare such as his hand.  Will follow-up in 1 year.  Recommend to not restart the sublingual immunotherapy.  We briefly discussed allergy shots.  Since he is doing well with Allegra that we will continue as needed.    Thank you for allowing me to participate in the care of José Augustine.      I spent 32 minutes on the date of the encounter doing chart review, history and exam, documentation and further coordination as noted above exclusive of separately reported interpretations    Sergio Dorantes MD  Allergy/Immunology  [facility]

## 2024-02-21 NOTE — ED AVS SNAPSHOT
Emergency Department  [address] [place] [address]  Phone:  [phone]  Fax:  [phone]                                    José Augustine   MRN: [id number]    Department:   Emergency Department   Date of Visit:  3/17/2019           After Visit Summary Signature Page    I have received my discharge instructions, and my questions have been answered. I have discussed any challenges I see with this plan with the nurse or doctor.    ..........................................................................................................................................  Patient/Patient Representative Signature      ..........................................................................................................................................  Patient Representative Print Name and Relationship to Patient    ..................................................               ................................................  Date                                   Time    ..........................................................................................................................................  Reviewed by Signature/Title    ...................................................              ..............................................  Date                                               Time          22EPIC Rev 08/18        GENERAL SURGERY  CONSULT NOTE  2/21/2024    Physician Consulted: Dr. Mondragon  01/13/2012    UPPER GASTROINTESTINAL ENDOSCOPY  11/12/2019    [facility]       

## 2024-05-23 ENCOUNTER — TELEPHONE (OUTPATIENT)
Dept: FAMILY MEDICINE | Facility: CLINIC | Age: 49
End: 2024-05-23
Payer: COMMERCIAL

## 2024-05-23 NOTE — CONFIDENTIAL NOTE
Patient Quality Outreach    Patient is due for the following:   Asthma  -  ACT needed  Colon Cancer Screening  Physical Preventive Adult Physical    Next Steps:   Schedule a Adult Preventative  Patient was assigned appropriate questionnaire to complete    Type of outreach:    Sent [employer] message.      Questions for provider review:    None           Kwame Pollack MA

## 2024-07-15 ENCOUNTER — LAB (OUTPATIENT)
Dept: LAB | Facility: CLINIC | Age: 49
End: 2024-07-15
Payer: COMMERCIAL

## 2024-07-15 DIAGNOSIS — C73 PAPILLARY THYROID CARCINOMA (H): ICD-10-CM

## 2024-07-15 DIAGNOSIS — E89.0 POSTSURGICAL HYPOTHYROIDISM: ICD-10-CM

## 2024-07-15 LAB
T4 FREE SERPL-MCNC: 1.75 NG/DL (ref 0.9–1.7)
TSH SERPL DL<=0.005 MIU/L-ACNC: 0.76 UIU/ML (ref 0.3–4.2)

## 2024-07-15 PROCEDURE — 99000 SPECIMEN HANDLING OFFICE-LAB: CPT

## 2024-07-15 PROCEDURE — 36415 COLL VENOUS BLD VENIPUNCTURE: CPT

## 2024-07-15 PROCEDURE — 86800 THYROGLOBULIN ANTIBODY: CPT | Mod: 90

## 2024-07-15 PROCEDURE — 84439 ASSAY OF FREE THYROXINE: CPT

## 2024-07-15 PROCEDURE — 84432 ASSAY OF THYROGLOBULIN: CPT | Mod: 90

## 2024-07-15 PROCEDURE — 84443 ASSAY THYROID STIM HORMONE: CPT

## 2024-07-17 ENCOUNTER — ANCILLARY PROCEDURE (OUTPATIENT)
Dept: ULTRASOUND IMAGING | Facility: CLINIC | Age: 49
End: 2024-07-17
Payer: COMMERCIAL

## 2024-07-17 DIAGNOSIS — C73 PAPILLARY THYROID CARCINOMA (H): ICD-10-CM

## 2024-07-17 DIAGNOSIS — E89.0 POSTSURGICAL HYPOTHYROIDISM: ICD-10-CM

## 2024-07-17 PROCEDURE — 76536 US EXAM OF HEAD AND NECK: CPT | Mod: GC | Performed by: RADIOLOGY

## 2024-07-19 LAB — SCANNED LAB RESULT: NORMAL

## 2024-07-29 ENCOUNTER — VIRTUAL VISIT (OUTPATIENT)
Dept: ENDOCRINOLOGY | Facility: CLINIC | Age: 49
End: 2024-07-29
Payer: COMMERCIAL

## 2024-07-29 DIAGNOSIS — C73 PAPILLARY THYROID CARCINOMA (H): ICD-10-CM

## 2024-07-29 DIAGNOSIS — E89.0 POSTSURGICAL HYPOTHYROIDISM: ICD-10-CM

## 2024-07-29 PROCEDURE — G2211 COMPLEX E/M VISIT ADD ON: HCPCS | Mod: 95

## 2024-07-29 PROCEDURE — 99214 OFFICE O/P EST MOD 30 MIN: CPT | Mod: 95

## 2024-07-29 RX ORDER — LEVOTHYROXINE SODIUM 125 UG/1
TABLET ORAL
Qty: 162 TABLET | Refills: 4 | Status: SHIPPED | OUTPATIENT
Start: 2024-07-29

## 2024-07-29 NOTE — PATIENT INSTRUCTIONS
Labs about 2 weeks prior to next appt    See me approximately every 12-18 months     Next neck US 2028 or sooner prn

## 2024-07-29 NOTE — PROGRESS NOTES
Endocrinology video visit    Assessment/plan  1. papillary thyroid carcinoma, 1.5 cm with extrathyroid extension, node positive, BRAF mutant positive.  He has been treated with surgery, 100 mCi 131I.    pT3, pN1a, pMx, cM0  JESUS recurrence risk intermediate  labs about 2 weeks prior: Tg, TSH, free T4   See me every 12-18 months  neck US 2028 or sooner prn      2.  Post surgical hypothyroidism.    Treat to TSH < 0.4. .   He is currently using 13.5 of the 125 mcg tablets/week (mean 241 mcg/day over the course of a week)  Rx refilled.    No change in dose.      The Longitudinal plan of care for postsurgical hypothyroidism related to thyroid cancer/thyroid cancer surveillance was addressed during this visit. Due to added complexity of care, we will continue to support José , and the subsequent management of this condition(s) and with the ongoing continuity of care of this condition.    Due to the continued risks of COVID 19 transmission and to improve ease of access this visit was a video visit. The patient gave verbal consent for the visit today.    I have independently reviewed and interpreted labs, imaging as indicated.     Distant Location (provider location):  Off-site  Mode of Communication:  Video Conference via [employer]  Chart review/prep time 1  [id number]  Visit Start time 1459  Visit Stop time  1503  18__ minutes spent on the date of the encounter doing chart review, history and exam, documentation and further activities as noted above.    Nettie Carolina MD      Cc/ HISTORY OF PRESENT ILLNESS José presents for follow up of + BRAF papillary thyroid cancer, post surgical hypothyroidism.  He had + BRAF mutation on the FNAB specimen. He was last seen by me 7/2023 .  At that time he was on  13.5 tablets/week, using the 125 mcg tablets (241 mcg/day average).  He continues on this dose.  He already had labs in anticipation of this appt    The  thyroid cancer treatment course has been as follows:   12/9/13  Total thyroidectomy, central neck dissection removing right PCT 1.5 cm, extending to extrathyroidal fatty tissue (foca ETE).  6/10 level 6 LN were positive for PCT.   MACIS:Total 4.55 if invasion and no distant  pT3, pN1a, pMx  8/13/14 131I 100 mCi after 2 dose Thyrogen stimulation.    11/12/15 thyrogen stimulated 3 mCi 123I TBS  negative. Day 5 thyrogen stimulated Tg was  048 with TSH 16.4.       We have the following relevant lab data:  6/30/11 TSh 0.99  10/3/12: vitamin D 16.4  9/4/13: TSh 1.07, calcium 9.1, BUN17, creatinin0.85, alk phos 54, vitamin D 41.7  1/21/14: Tg 0.48, JESUS < 0.4, TSH 9.77  5/20/14: Tg 0.31, JESUS < 0.4, TSH1.02  8/13/14: Tg 0.89, JESUS < 0.4,  -  8/18/14 post therapy scan:  neck uptake  10/14/14: Tg 0.14, JESUS < 0.4, TSH 1.19  12/19/14 TSH 0.3, free T4 1.39  4/14/15: Tg 0.17, JESUS < 0.4, TSH 0.07  11/13/15: Tg 0.48, JESUS < 0.4, TSH 16,40  4/18/16: Tg 0.14 (concurrent 0.45), JESUS < 0.4,  12/20/17: Tg 0.16 (concurrent 0.17), JESUS < 0.4, TSH 0.02, free T4 1.57-   2/4/19: Tg 0.14 (concurrent 0.13), JESUS < 0.4, TSH 1.4, free T4 1.02 -   2/4/2020 Tg 0.28 (concurrent 0.16), JESUS < 0.4- TSH 0.75, free T4 1.14  12/8/2020: Tg 0.12 (no concurrent), JESUS < 0.4, TSH 0.09, free T4 1.22, Ca 8.4, creatinine 1.02.    6/26/23 Tg 0.18 (concurrent 0.11) , JESUS < 0.4, TSh 0.79, free T4 1.56-  7/15/24 Tg 0.34 (concurrent 0.2), JESUS < 0.4, TSH 0.76, free T4 1.75    Imaging reviewed/re-read by me today  7/17/24 neck US  compared with 4/15/2022,  2/6/2020:, 12/26/17  Right level 4 # 1 1 0.4 x0.7 x 0.9 cm  was 0.3 x 0.6 x 0.8 ;  0.4 x 0.6 x 0.8 cm - not seen 2017 but cine not comparable.     REVIEW OF SYSTEMS  Doing well   Energy good  Sleep OK   Cardiac: negative  Respiratory: negative  GI: negative     Past Medical History  Past Medical History:   Diagnosis Date    Allergic state     Asthma age 12    Eczema     Lyme disease 07/2021    Papillary thyroid carcinoma (H) 12/09/2013    right 1.5 cm, ETE, LN+, BRAF+     Postsurgical hypothyroidism 12/09/2013     Past Surgical History:   Procedure Laterality Date    ORTHOPEDIC SURGERY      shoulder labral repair    THYROIDECTOMY  12/9/2013    Procedure: THYROIDECTOMY;  Total Thyroidectomy, Central Neck Dissection ;  Surgeon: Pillo Henley MD;  Location:  OR     Medications  Current Outpatient Medications   Medication Sig Dispense Refill    albuterol (PROAIR HFA/PROVENTIL HFA/VENTOLIN HFA) 108 (90 Base) MCG/ACT inhaler Inhale 2 puffs into the lungs every 6 hours as needed for shortness of breath / dyspnea or wheezing 18 g 3    fexofenadine (ALLEGRA) 180 MG tablet Take 180 mg by mouth daily      fluticasone-salmeterol (ADVAIR) 100-50 MCG/ACT inhaler Inhale 1 puff into the lungs every 12 hours 3 each 3    fluticasone-salmeterol (ADVAIR) 100-50 MCG/ACT inhaler Inhale 1 puff into the lungs every 12 hours 1 each 4    levothyroxine (SYNTHROID/LEVOTHROID) 125 MCG tablet MON to SAT 2 tablet/day; SUN 1.5 tablet 162 tablet 4    Probiotic Product (PROBIOTIC ADVANCED PO)       triamcinolone (KENALOG) 0.1 % external ointment Apply topically 2 times daily 30 g 1     Allergies  No Known Allergies    Family History  Family History   Problem Relation Age of Onset    Cancer Father         skin    Diabetes Other     Thyroid Cancer No family hx of     Thyroid Disease No family hx of      Social History  Social History     Tobacco Use    Smoking status: Never    Smokeless tobacco: Never   Substance Use Topics    Alcohol use: Yes     Comment: weekly; 4 x a week, 1-2 glass of wine    Drug use: No      works from home .     Doing more weight work out 3 times/week     Physical Exam  There were no vitals taken for this visit.  There is no height or weight on file to calculate BMI.   BP Readings from Last 1 Encounters:   12/08/23 116/75      Pulse Readings from Last 1 Encounters:   12/08/23 65      Resp Readings from Last 1 Encounters:   12/08/23 16      Temp Readings from Last 1 Encounters:   07/13/22 96.8  " F (36  C) (Tympanic)      SpO2 Readings from Last 1 Encounters:   12/08/23 99%      Wt Readings from Last 1 Encounters:   12/08/23 112.5 kg (248 lb)      Ht Readings from Last 1 Encounters:   04/13/22 2.043 m (6' 8.43\")     GENERAL: pleasant  man in  no distress, sitting at desk  SKIN: Visible skin clear  EYES: Eyes grossly normal to inspection.    NECK: no visible masses;   RESP: No audible wheeze, cough.  No  increased work of breathing.    NEURO: Awake, alert, responds appropriately to questions.  Mentation and speech fluent.  PSYCH:affect normal, and appearance well-groomed.    DATA REVIEW   Latest Ref Rng 6/29/2022  2:03 PM 6/26/2023  9:45 AM 7/15/2024  9:34 AM   ENDO THYROID LABS-[facility]       TSH 0.40 - 4.00 mU/L 0.19 (L)      TSH 0.30 - 4.20 uIU/mL  0.79  0.76    FREE T4 0.90 - 1.70 ng/dL 1.27  1.56  1.75 (H)         Narrative & Impression   EXAMINATION: US HEAD NECK SOFT TISSUE, 7/17/2024 1:12 PM   COMPARISON: 4/15/2022  HISTORY: Postsurgical hypothyroidism; Papillary thyroid carcinoma (H)  TECHNIQUE: Sonographic imaging performed of the neck to evaluate for  lymph nodes using grey scale and limited Doppler technique.  FINDINGS:  Lymph nodes are measured bilaterally with measurements given in  transverse, AP, and craniocaudal dimensions as follows:  Right:  Level 2, #1: 1.1 x 0.6 x 1.5 cm, normal morphology, unchanged  Level 2, #2: 1.0 x 0.6 x 2.4 cm, normal morphology, unchanged  Level 2, #3: 1.2 x 0.5 x 2.5 cm, normal morphology, unchanged  Level 3: None  Level 4: 0.4 x 0.7 x 0.9 cm, hypoechoic and rounded with suggestion of  fatty hilum, unchanged  Level 5: None  Level 6: None  Left:  Level 2, #1: 0.9 x 0.5 x 1.3 cm, normal morphology, unchanged  Level 2, #2: 1.5 x 0.5 x 2.1 cm, normal morphology, unchanged  Level 3: None  Level 4: None  Level 5: None  Level 6: None                                                          IMPRESSION:  1.  Soft tissue neck ultrasound with lymph node measurements " as  described above. No suspicious lymph nodes.  I have personally reviewed the examination and initial interpretation  and I agree with the findings.  CARYL OLIVEIRA MD

## 2024-07-29 NOTE — NURSING NOTE
Current patient location: [place]    Is the patient currently in the state of [place]? YES    Visit mode:VIDEO    If the visit is dropped, the patient can be reconnected by: VIDEO VISIT: Text to cell phone:   Telephone Information:   Mobile [phone]       Will anyone else be joining the visit? NO  (If patient encounters technical issues they should call [phone])    How would you like to obtain your AVS? MyChart    Are changes needed to the allergy or medication list? Yes, patient reported taking 13.5 tablets per week of levothyroxine.      Are refills needed on medications prescribed by this physician? No    Reason for visit: RECHECK    Shital FLORES

## 2024-07-29 NOTE — LETTER
7/29/2024       RE: José Augustine  [address] [facility] [address]     Dear Colleague,    Thank you for referring your patient, José Augustine, to the [facility]IC [facility]. Please see a copy of my visit note below.    Endocrinology video visit    Assessment/plan  1. papillary thyroid carcinoma, 1.5 cm with extrathyroid extension, node positive, BRAF mutant positive.  He has been treated with surgery, 100 mCi 131I.    pT3, pN1a, pMx, cM0  JESUS recurrence risk intermediate  labs about 2 weeks prior: Tg, TSH, free T4   See me every 12-18 months  neck US 2028 or sooner prn      2.  Post surgical hypothyroidism.    Treat to TSH < 0.4. .   He is currently using 13.5 of the 125 mcg tablets/week (mean 241 mcg/day over the course of a week)  Rx refilled.    No change in dose.      The Longitudinal plan of care for postsurgical hypothyroidism related to thyroid cancer/thyroid cancer surveillance was addressed during this visit. Due to added complexity of care, we will continue to support José , and the subsequent management of this condition(s) and with the ongoing continuity of care of this condition.    Due to the continued risks of COVID 19 transmission and to improve ease of access this visit was a video visit. The patient gave verbal consent for the visit today.    I have independently reviewed and interpreted labs, imaging as indicated.     Distant Location (provider location):  Off-site  Mode of Communication:  Video Conference via [employer]  Chart review/prep time 1  7819-9005  Visit Start time 1459  Visit Stop time  1503  18__ minutes spent on the date of the encounter doing chart review, history and exam, documentation and further activities as noted above.    Nettie Carolina MD      Cc/ HISTORY OF PRESENT ILLNESS José presents for follow up of + BRAF papillary thyroid cancer, post surgical hypothyroidism.  He had  + BRAF mutation on the FNAB specimen. He was last seen by me 7/2023 .  At that time he was on  13.5 tablets/week, using the 125 mcg tablets (241 mcg/day average).  He continues on this dose.  He already had labs in anticipation of this appt    The  thyroid cancer treatment course has been as follows:   12/9/13 Total thyroidectomy, central neck dissection removing right PCT 1.5 cm, extending to extrathyroidal fatty tissue (foca ETE).  6/10 level 6 LN were positive for PCT.   MACIS:Total 4.55 if invasion and no distant  pT3, pN1a, pMx  8/13/14 131I 100 mCi after 2 dose Thyrogen stimulation.    11/12/15 thyrogen stimulated 3 mCi 123I TBS  negative. Day 5 thyrogen stimulated Tg was  048 with TSH 16.4.       We have the following relevant lab data:  6/30/11 TSh 0.99  10/3/12: vitamin D 16.4  9/4/13: TSh 1.07, calcium 9.1, BUN17, creatinin0.85, alk phos 54, vitamin D 41.7  1/21/14: Tg 0.48, JESUS < 0.4, TSH 9.77  5/20/14: Tg 0.31, JESUS < 0.4, TSH1.02  8/13/14: Tg 0.89, JESSU < 0.4,  -  8/18/14 post therapy scan:  neck uptake  10/14/14: Tg 0.14, JESUS < 0.4, TSH 1.19  12/19/14 TSH 0.3, free T4 1.39  4/14/15: Tg 0.17, JESUS < 0.4, TSH 0.07  11/13/15: Tg 0.48, JESUS < 0.4, TSH 16,40  4/18/16: Tg 0.14 (concurrent 0.45), JESUS < 0.4,  12/20/17: Tg 0.16 (concurrent 0.17), JESUS < 0.4, TSH 0.02, free T4 1.57-   2/4/19: Tg 0.14 (concurrent 0.13), JESUS < 0.4, TSH 1.4, free T4 1.02 -   2/4/2020 Tg 0.28 (concurrent 0.16), JESUS < 0.4- TSH 0.75, free T4 1.14  12/8/2020: Tg 0.12 (no concurrent), JESUS < 0.4, TSH 0.09, free T4 1.22, Ca 8.4, creatinine 1.02.    6/26/23 Tg 0.18 (concurrent 0.11) , JESUS < 0.4, TSh 0.79, free T4 1.56-  7/15/24 Tg 0.34 (concurrent 0.2), JESUS < 0.4, TSH 0.76, free T4 1.75    Imaging reviewed/re-read by me today  7/17/24 neck US  compared with 4/15/2022,  2/6/2020:, 12/26/17  Right level 4 # 1 1 0.4 x0.7 x 0.9 cm  was 0.3 x 0.6 x 0.8 ;  0.4 x 0.6 x 0.8 cm - not seen 2017 but cine not comparable.     REVIEW OF SYSTEMS  Doing  well   Energy good  Sleep OK   Cardiac: negative  Respiratory: negative  GI: negative     Past Medical History  Past Medical History:   Diagnosis Date     Allergic state      Asthma age 12     Eczema      Lyme disease 07/2021     Papillary thyroid carcinoma (H) 12/09/2013    right 1.5 cm, ETE, LN+, BRAF+     Postsurgical hypothyroidism 12/09/2013     Past Surgical History:   Procedure Laterality Date     ORTHOPEDIC SURGERY      shoulder labral repair     THYROIDECTOMY  12/9/2013    Procedure: THYROIDECTOMY;  Total Thyroidectomy, Central Neck Dissection ;  Surgeon: Pillo Henley MD;  Location:  OR     Medications  Current Outpatient Medications   Medication Sig Dispense Refill     albuterol (PROAIR HFA/PROVENTIL HFA/VENTOLIN HFA) 108 (90 Base) MCG/ACT inhaler Inhale 2 puffs into the lungs every 6 hours as needed for shortness of breath / dyspnea or wheezing 18 g 3     fexofenadine (ALLEGRA) 180 MG tablet Take 180 mg by mouth daily       fluticasone-salmeterol (ADVAIR) 100-50 MCG/ACT inhaler Inhale 1 puff into the lungs every 12 hours 3 each 3     fluticasone-salmeterol (ADVAIR) 100-50 MCG/ACT inhaler Inhale 1 puff into the lungs every 12 hours 1 each 4     levothyroxine (SYNTHROID/LEVOTHROID) 125 MCG tablet MON to SAT 2 tablet/day; SUN 1.5 tablet 162 tablet 4     Probiotic Product (PROBIOTIC ADVANCED PO)        triamcinolone (KENALOG) 0.1 % external ointment Apply topically 2 times daily 30 g 1     Allergies  No Known Allergies    Family History  Family History   Problem Relation Age of Onset     Cancer Father         skin     Diabetes Other      Thyroid Cancer No family hx of      Thyroid Disease No family hx of      Social History  Social History     Tobacco Use     Smoking status: Never     Smokeless tobacco: Never   Substance Use Topics     Alcohol use: Yes     Comment: weekly; 4 x a week, 1-2 glass of wine     Drug use: No      works from home .     Doing more weight work out 3 times/week     Physical  "Exam  There were no vitals taken for this visit.  There is no height or weight on file to calculate BMI.   BP Readings from Last 1 Encounters:   12/08/23 116/75      Pulse Readings from Last 1 Encounters:   12/08/23 65      Resp Readings from Last 1 Encounters:   12/08/23 16      Temp Readings from Last 1 Encounters:   07/13/22 96.8  F (36  C) (Tympanic)      SpO2 Readings from Last 1 Encounters:   12/08/23 99%      Wt Readings from Last 1 Encounters:   12/08/23 112.5 kg (248 lb)      Ht Readings from Last 1 Encounters:   04/13/22 2.043 m (6' 8.43\")     GENERAL: pleasant  man in  no distress, sitting at desk  SKIN: Visible skin clear  EYES: Eyes grossly normal to inspection.    NECK: no visible masses;   RESP: No audible wheeze, cough.  No  increased work of breathing.    NEURO: Awake, alert, responds appropriately to questions.  Mentation and speech fluent.  PSYCH:affect normal, and appearance well-groomed.    DATA REVIEW   Latest Ref Rng 6/29/2022  2:03 PM 6/26/2023  9:45 AM 7/15/2024  9:34 AM   ENDO THYROID LABS-[facility]       TSH 0.40 - 4.00 mU/L 0.19 (L)      TSH 0.30 - 4.20 uIU/mL  0.79  0.76    FREE T4 0.90 - 1.70 ng/dL 1.27  1.56  1.75 (H)         Narrative & Impression   EXAMINATION: US HEAD NECK SOFT TISSUE, 7/17/2024 1:12 PM   COMPARISON: 4/15/2022  HISTORY: Postsurgical hypothyroidism; Papillary thyroid carcinoma (H)  TECHNIQUE: Sonographic imaging performed of the neck to evaluate for  lymph nodes using grey scale and limited Doppler technique.  FINDINGS:  Lymph nodes are measured bilaterally with measurements given in  transverse, AP, and craniocaudal dimensions as follows:  Right:  Level 2, #1: 1.1 x 0.6 x 1.5 cm, normal morphology, unchanged  Level 2, #2: 1.0 x 0.6 x 2.4 cm, normal morphology, unchanged  Level 2, #3: 1.2 x 0.5 x 2.5 cm, normal morphology, unchanged  Level 3: None  Level 4: 0.4 x 0.7 x 0.9 cm, hypoechoic and rounded with suggestion of  fatty hilum, unchanged  Level 5: None  Level 6: " None  Left:  Level 2, #1: 0.9 x 0.5 x 1.3 cm, normal morphology, unchanged  Level 2, #2: 1.5 x 0.5 x 2.1 cm, normal morphology, unchanged  Level 3: None  Level 4: None  Level 5: None  Level 6: None                                                          IMPRESSION:  1.  Soft tissue neck ultrasound with lymph node measurements as  described above. No suspicious lymph nodes.  I have personally reviewed the examination and initial interpretation  and I agree with the findings.  CARYL OLIVEIRA MD           Again, thank you for allowing me to participate in the care of your patient.      Sincerely,    Nettie Carolina MD

## 2024-08-04 ENCOUNTER — HEALTH MAINTENANCE LETTER (OUTPATIENT)
Age: 49
End: 2024-08-04

## 2024-08-23 ENCOUNTER — TELEPHONE (OUTPATIENT)
Dept: ENDOCRINOLOGY | Facility: CLINIC | Age: 49
End: 2024-08-23
Payer: COMMERCIAL

## 2024-08-23 NOTE — TELEPHONE ENCOUNTER
Left Voicemail (1st Attempt) and Sent Mychart (1st Attempt) for the patient to call back and schedule the following:    Appointment type: return thyroid cancer   Provider: Caity   Return date: 12-18 month f/up (around 7/29/25-1/29/2026)   Specialty phone number: [phone]  Additional appointment(s) needed: lab draw appt 2 weeks prior to f/up appt   Additonal Notes:   Check Out Comments: Labs about 2 weeks prior to next appt RTC 12-18 months     Elsie Boston on 8/23/2024 at 4:30 PM

## 2024-10-30 ENCOUNTER — OFFICE VISIT (OUTPATIENT)
Dept: FAMILY MEDICINE | Facility: CLINIC | Age: 49
End: 2024-10-30
Payer: COMMERCIAL

## 2024-10-30 VITALS
BODY MASS INDEX: 28.78 KG/M2 | RESPIRATION RATE: 16 BRPM | TEMPERATURE: 96.8 F | HEART RATE: 72 BPM | SYSTOLIC BLOOD PRESSURE: 119 MMHG | HEIGHT: 78 IN | WEIGHT: 248.7 LBS | OXYGEN SATURATION: 96 % | DIASTOLIC BLOOD PRESSURE: 78 MMHG

## 2024-10-30 DIAGNOSIS — C73 PAPILLARY THYROID CARCINOMA (H): ICD-10-CM

## 2024-10-30 DIAGNOSIS — J45.30 MILD PERSISTENT ASTHMA, UNSPECIFIED WHETHER COMPLICATED: ICD-10-CM

## 2024-10-30 DIAGNOSIS — G89.29 CHRONIC LEFT-SIDED LOW BACK PAIN WITH LEFT-SIDED SCIATICA: ICD-10-CM

## 2024-10-30 DIAGNOSIS — Z13.21 ENCOUNTER FOR VITAMIN DEFICIENCY SCREENING: ICD-10-CM

## 2024-10-30 DIAGNOSIS — Z00.00 ROUTINE HISTORY AND PHYSICAL EXAMINATION OF ADULT: Primary | ICD-10-CM

## 2024-10-30 DIAGNOSIS — M54.42 CHRONIC LEFT-SIDED LOW BACK PAIN WITH LEFT-SIDED SCIATICA: ICD-10-CM

## 2024-10-30 DIAGNOSIS — Z12.11 SCREEN FOR COLON CANCER: ICD-10-CM

## 2024-10-30 DIAGNOSIS — E78.5 DYSLIPIDEMIA: ICD-10-CM

## 2024-10-30 LAB
ALBUMIN SERPL BCG-MCNC: 4.2 G/DL (ref 3.5–5.2)
ALP SERPL-CCNC: 59 U/L (ref 40–150)
ALT SERPL W P-5'-P-CCNC: 34 U/L (ref 0–70)
ANION GAP SERPL CALCULATED.3IONS-SCNC: 11 MMOL/L (ref 7–15)
AST SERPL W P-5'-P-CCNC: 25 U/L (ref 0–45)
BILIRUB SERPL-MCNC: 0.4 MG/DL
BUN SERPL-MCNC: 15.5 MG/DL (ref 6–20)
CALCIUM SERPL-MCNC: 9.2 MG/DL (ref 8.8–10.4)
CHLORIDE SERPL-SCNC: 109 MMOL/L (ref 98–107)
CHOLEST SERPL-MCNC: 222 MG/DL
CREAT SERPL-MCNC: 1.13 MG/DL (ref 0.67–1.17)
EGFRCR SERPLBLD CKD-EPI 2021: 81 ML/MIN/1.73M2
ERYTHROCYTE [DISTWIDTH] IN BLOOD BY AUTOMATED COUNT: 12 % (ref 10–15)
FASTING STATUS PATIENT QL REPORTED: NO
FASTING STATUS PATIENT QL REPORTED: NO
GLUCOSE SERPL-MCNC: 117 MG/DL (ref 70–99)
HCO3 SERPL-SCNC: 24 MMOL/L (ref 22–29)
HCT VFR BLD AUTO: 44.9 % (ref 40–53)
HDLC SERPL-MCNC: 46 MG/DL
HGB BLD-MCNC: 15 G/DL (ref 13.3–17.7)
LDLC SERPL CALC-MCNC: 134 MG/DL
MCH RBC QN AUTO: 29.9 PG (ref 26.5–33)
MCHC RBC AUTO-ENTMCNC: 33.4 G/DL (ref 31.5–36.5)
MCV RBC AUTO: 89 FL (ref 78–100)
NONHDLC SERPL-MCNC: 176 MG/DL
PLATELET # BLD AUTO: 195 10E3/UL (ref 150–450)
POTASSIUM SERPL-SCNC: 4.4 MMOL/L (ref 3.4–5.3)
PROT SERPL-MCNC: 6.6 G/DL (ref 6.4–8.3)
RBC # BLD AUTO: 5.02 10E6/UL (ref 4.4–5.9)
SODIUM SERPL-SCNC: 144 MMOL/L (ref 135–145)
TRIGL SERPL-MCNC: 210 MG/DL
VIT B12 SERPL-MCNC: 270 PG/ML (ref 232–1245)
VIT D+METAB SERPL-MCNC: 29 NG/ML (ref 20–50)
WBC # BLD AUTO: 9.5 10E3/UL (ref 4–11)

## 2024-10-30 PROCEDURE — 82607 VITAMIN B-12: CPT | Performed by: INTERNAL MEDICINE

## 2024-10-30 PROCEDURE — 99396 PREV VISIT EST AGE 40-64: CPT | Mod: 25 | Performed by: INTERNAL MEDICINE

## 2024-10-30 PROCEDURE — 90677 PCV20 VACCINE IM: CPT | Performed by: INTERNAL MEDICINE

## 2024-10-30 PROCEDURE — 80061 LIPID PANEL: CPT | Performed by: INTERNAL MEDICINE

## 2024-10-30 PROCEDURE — 90480 ADMN SARSCOV2 VAC 1/ONLY CMP: CPT | Performed by: INTERNAL MEDICINE

## 2024-10-30 PROCEDURE — 91320 SARSCV2 VAC 30MCG TRS-SUC IM: CPT | Performed by: INTERNAL MEDICINE

## 2024-10-30 PROCEDURE — 90471 IMMUNIZATION ADMIN: CPT | Performed by: INTERNAL MEDICINE

## 2024-10-30 PROCEDURE — 85027 COMPLETE CBC AUTOMATED: CPT | Performed by: INTERNAL MEDICINE

## 2024-10-30 PROCEDURE — 82306 VITAMIN D 25 HYDROXY: CPT | Performed by: INTERNAL MEDICINE

## 2024-10-30 PROCEDURE — 36415 COLL VENOUS BLD VENIPUNCTURE: CPT | Performed by: INTERNAL MEDICINE

## 2024-10-30 PROCEDURE — 80053 COMPREHEN METABOLIC PANEL: CPT | Performed by: INTERNAL MEDICINE

## 2024-10-30 SDOH — HEALTH STABILITY: PHYSICAL HEALTH: ON AVERAGE, HOW MANY DAYS PER WEEK DO YOU ENGAGE IN MODERATE TO STRENUOUS EXERCISE (LIKE A BRISK WALK)?: 3 DAYS

## 2024-10-30 ASSESSMENT — SOCIAL DETERMINANTS OF HEALTH (SDOH): HOW OFTEN DO YOU GET TOGETHER WITH FRIENDS OR RELATIVES?: MORE THAN THREE TIMES A WEEK

## 2024-10-30 ASSESSMENT — ASTHMA QUESTIONNAIRES: ACT_TOTALSCORE: 18

## 2024-10-30 ASSESSMENT — PAIN SCALES - GENERAL: PAINLEVEL_OUTOF10: NO PAIN (0)

## 2024-10-30 NOTE — LETTER
My Asthma Action Plan    Name: José Augustine   YOB: 1975  Date: 10/30/2024   My doctor: Camilla Mathur MD   My clinic: [facility]        My Control Medicine: Fluticasone propionate + salmeterol (Advair Diskus or Wixela Inhub) -  100/50 mcg bid  My Rescue Medicine: Albuterol (Proair/Ventolin/Proventil HFA) 2-4 puffs EVERY 4 HOURS as needed. Use a spacer if recommended by your provider.  My Oral Steroid Medicine: na My Asthma Severity:   Mild Persistent  Know your asthma triggers: pollens and ragweed, tree pollen, dust               GREEN ZONE   Good Control  I feel good  No cough or wheeze  Can work, sleep and play without asthma symptoms       Take your asthma control medicine every day.     If exercise triggers your asthma, take your rescue medication  15 minutes before exercise or sports, and  During exercise if you have asthma symptoms  Spacer to use with inhaler: If you have a spacer, make sure to use it with your inhaler             YELLOW ZONE Getting Worse  I have ANY of these:  I do not feel good  Cough or wheeze  Chest feels tight  Wake up at night   Keep taking your Green Zone medications  Start taking your rescue medicine:  every 20 minutes for up to 1 hour. Then every 4 hours for 24-48 hours.  If you stay in the Yellow Zone for more than 12-24 hours, contact your doctor.  If you do not return to the Green Zone in 12-24 hours or you get worse, start taking your oral steroid medicine if prescribed by your provider.           RED ZONE Medical Alert - Get Help  I have ANY of these:  I feel awful  Medicine is not helping  Breathing getting harder  Trouble walking or talking  Nose opens wide to breathe       Take your rescue medicine NOW  If your provider has prescribed an oral steroid medicine, start taking it NOW  Call your doctor NOW  If you are still in the Red Zone after 20 minutes and you have not reached your doctor:  Take your rescue medicine again and  Call 911 or  go to the emergency room right away    See your regular doctor within 2 weeks of an Emergency Room or Urgent Care visit for follow-up treatment.          Annual Reminders:  Meet with Asthma Educator,  Flu Shot in the Fall, consider Pneumonia Vaccination for patients with asthma (aged 19 and older).    Pharmacy: [id number] [place], [place] - [address]    Electronically signed by Camilla Mathur MD   Date: 10/30/24                      Asthma Triggers  How To Control Things That Make Your Asthma Worse    Triggers are things that make your asthma worse.  Look at the list below to help you find your triggers and what you can do about them.  You can help prevent asthma flare-ups by staying away from your triggers.      Trigger                                                          What you can do   Cigarette Smoke  Tobacco smoke can make asthma worse. Do not allow smoking in your home, car or around you.  Be sure no one smokes at a child s day care or school.  If you smoke, ask your health care provider for ways to help you quit.  Ask family members to quit too.  Ask your health care provider for a referral to Quit Plan to help you quit smoking, or call 2-439-412-PLAN.     Colds, Flu, Bronchitis  These are common triggers of asthma. Wash your hands often.  Don t touch your eyes, nose or mouth.  Get a flu shot every year.     Dust Mites  These are tiny bugs that live in cloth or carpet. They are too small to see. Wash sheets and blankets in hot water every week.   Encase pillows and mattress in dust mite proof covers.  Avoid having carpet if you can. If you have carpet, vacuum weekly.   Use a dust mask and HEPA vacuum.   Pollen and Outdoor Mold  Some people are allergic to trees, grass, or weed pollen, or molds. Try to keep your windows closed.  Limit time out doors when pollen count is high.   Ask you health care provider about taking medicine during allergy season.     Animal Dander  Some people  are allergic to skin flakes, urine or saliva from pets with fur or feathers. Keep pets with fur or feathers out of your home.    If you can t keep the pet outdoors, then keep the pet out of your bedroom.  Keep the bedroom door closed.  Keep pets off cloth furniture and away from stuffed toys.     Mice, Rats, and Cockroaches   Some people are allergic to the waste from these pests.   Cover food and garbage.  Clean up spills and food crumbs.  Store grease in the refrigerator.   Keep food out of the bedroom.   Indoor Mold  This can be a trigger if your home has high moisture. Fix leaking faucets, pipes, or other sources of water.   Clean moldy surfaces.  Dehumidify basement if it is damp and smelly.   Smoke, Strong Odors, and Sprays  These can reduce air quality. Stay away from strong odors and sprays, such as perfume, powder, hair spray, paints, smoke incense, paint, cleaning products, candles and new carpet.   Exercise or Sports  Some people with asthma have this trigger. Be active!  Ask your doctor about taking medicine before sports or exercise to prevent symptoms.    Warm up for 5-10 minutes before and after sports or exercise.     Other Triggers of Asthma  Cold air:  Cover your nose and mouth with a scarf.  Sometimes laughing or crying can be a trigger.  Some medicines and food can trigger asthma.

## 2024-10-30 NOTE — PROGRESS NOTES
Preventive Care Visit  [facility]  Camilla Mathur MD, Internal Medicine  Oct 30, 2024      Assessment & Plan   Problem List Items Addressed This Visit       Papillary thyroid carcinoma    Mild persistent asthma, unspecified whether complicated     Other Visit Diagnoses       Routine history and physical examination of adult    -  Primary    Relevant Orders    Comprehensive metabolic panel (BMP + Alb, Alk Phos, ALT, AST, Total. Bili, TP) (Completed)    CBC with platelets (Completed)    Screen for colon cancer        Relevant Orders    Colonoscopy Screening  Referral    Encounter for vitamin deficiency screening        Relevant Orders    Vitamin B12 (Completed)    Vitamin D Deficiency (Completed)    Dyslipidemia        Relevant Orders    Lipid panel reflex to direct LDL Fasting (Completed)    Chronic left-sided low back pain with left-sided sciatica        Stable/stretching         Continue follow-up with allergy specialist.  Vaccines COVID and PCV 20.  Daughter is being tested for pertussis, there was exposure at school, if she is positive patient will be getting a Z-Christopher prescription.  Patient denies any history of hypoparathyroidism.  Will check calcium level.  Follows with endocrinology for history of papillary thyroid cancer -in remission.  He follows with allergy specialist for history of asthma that seems to have flared over the last couple weeks.      The 10-year ASCVD risk score (Sj CRANDALL, et al., 2019) is: 2.9%    Values used to calculate the score:      Age: 49 years      Sex: Male      Is Non- : No      Diabetic: No      Tobacco smoker: No      Systolic Blood Pressure: 119 mmHg      Is BP treated: No      HDL Cholesterol: 46 mg/dL      Total Cholesterol: 222 mg/dL      Patient has been advised of split billing requirements and indicates understanding: Yes       BMI  Estimated body mass index is 27.03 kg/m  as calculated from the following:    Height as  "of this encounter: 2.043 m (6' 8.43\").    Weight as of this encounter: 112.8 kg (248 lb 11.2 oz).   Weight management plan: Discussed healthy diet and exercise guidelines    Counseling  Appropriate preventive services were addressed with this patient via screening, questionnaire, or discussion as appropriate for fall prevention, nutrition, physical activity, Tobacco-use cessation, social engagement, weight loss and cognition.  Checklist reviewing preventive services available has been given to the patient.  Reviewed patient's diet, addressing concerns and/or questions.   He is at risk for lack of exercise and has been provided with information to increase physical activity for the benefit of his well-being.   He is at risk for psychosocial distress and has been provided with information to reduce risk.     Work on weight loss  Regular exercise  See Patient Instructions      Yuliana Kumar is a 49 year old, presenting for the following:  Physical and Referral (colonoscopy)        10/30/2024     8:01 AM   Additional Questions   Roomed by kiah REYES    Sees asthma specialist and allergy specialist using triamcinolone his eczema is flared and More frequent.  He is on low gluten diet has some loose stools.  He has been on gluten-free diet for 10 years at least.  Follows with endocrinology for papillary thyroid cancer remains on suppressive dose of levothyroxine.  Is nearsighted he will need readers.  Next no hearing problems.  No GERD symptoms.  No palpitation or chest pain.  He has been feeling some asthma for shortness of breath over the last 2 to 3 weeks but usually in general his asthma is well-controlled.  Occasional flare of left-sided sciatica.      Health Care Directive  Patient does not have a Health Care Directive: Patient states has Advance Directive and will bring in a copy to clinic.      10/30/2024   General Health   How would you rate your overall physical health? Good   Feel stress (tense, " anxious, or unable to sleep) Only a little      (!) STRESS CONCERN      10/30/2024   Nutrition   Three or more servings of calcium each day? Yes   Diet: Gluten-free/reduced   How many servings of fruit and vegetables per day? (!) 2-3   How many sweetened beverages each day? 0-1            10/30/2024   Exercise   Days per week of moderate/strenous exercise 3 days            10/30/2024   Social Factors   Frequency of gathering with friends or relatives More than three times a week   Worry food won't last until get money to buy more No   Food not last or not have enough money for food? No   Do you have housing? (Housing is defined as stable permanent housing and does not include staying ouside in a car, in a tent, in an abandoned building, in an overnight shelter, or couch-surfing.) Yes   Are you worried about losing your housing? No   Lack of transportation? No   Unable to get utilities (heat,electricity)? No            10/30/2024   Dental   Dentist two times every year? Yes            10/30/2024   TB Screening   Were you born outside of the [place]? No              Today's PHQ-2 Score:       7/29/2024     2:35 PM   PHQ-2 ( 1999 [employer])   Q1: Little interest or pleasure in doing things 0   Q2: Feeling down, depressed or hopeless 0   PHQ-2 Score 0         10/30/2024   Substance Use   Alcohol more than 3/day or more than 7/wk No   Do you use any other substances recreationally? No        Social History     Tobacco Use    Smoking status: Never    Smokeless tobacco: Never   Substance Use Topics    Alcohol use: Yes     Comment: weekly; 4 x a week, 1-2 glass of wine    Drug use: No           10/30/2024   STI Screening   New sexual partner(s) since last STI/HIV test? No      ASCVD Risk   The 10-year ASCVD risk score (Sj CRANDALL, et al., 2019) is: 2.9%    Values used to calculate the score:      Age: 49 years      Sex: Male      Is Non- : No      Diabetic: No      Tobacco smoker: No      Systolic  Blood Pressure: 119 mmHg      Is BP treated: No      HDL Cholesterol: 46 mg/dL      Total Cholesterol: 222 mg/dL        10/30/2024   Contraception/Family Planning   Questions about contraception or family planning No           Reviewed and updated as needed this visit by Provider      Problems               Past Medical History:   Diagnosis Date    Allergic state     Asthma age 12    Eczema     Lyme disease 07/2021    Papillary thyroid carcinoma (H) 12/09/2013    right 1.5 cm, ETE, LN+, BRAF+    Postsurgical hypothyroidism 12/09/2013     Past Surgical History:   Procedure Laterality Date    ORTHOPEDIC SURGERY      shoulder labral repair    THYROIDECTOMY  12/9/2013    Procedure: THYROIDECTOMY;  Total Thyroidectomy, Central Neck Dissection ;  Surgeon: Pillo Henley MD;  Location:  OR     Lab work is in process  Labs reviewed in EPIC  BP Readings from Last 3 Encounters:   10/30/24 119/78   12/08/23 116/75   06/02/23 133/82    Wt Readings from Last 3 Encounters:   10/30/24 112.8 kg (248 lb 11.2 oz)   12/08/23 112.5 kg (248 lb)   07/24/23 106.6 kg (235 lb)                  Patient Active Problem List   Diagnosis    Muscle calcification    Postsurgical hypothyroidism    Papillary thyroid carcinoma    Chronic right-sided low back pain without sciatica    Mild persistent asthma, unspecified whether complicated     Past Surgical History:   Procedure Laterality Date    ORTHOPEDIC SURGERY      shoulder labral repair    THYROIDECTOMY  12/9/2013    Procedure: THYROIDECTOMY;  Total Thyroidectomy, Central Neck Dissection ;  Surgeon: Pillo Henley MD;  Location:  OR       Social History     Tobacco Use    Smoking status: Never    Smokeless tobacco: Never   Substance Use Topics    Alcohol use: Yes     Comment: weekly; 4 x a week, 1-2 glass of wine     Family History   Problem Relation Age of Onset    Cancer Father         skin    Diabetes Other     Thyroid Cancer No family hx of     Thyroid Disease No family hx of       "    Current Outpatient Medications   Medication Sig Dispense Refill    albuterol (PROAIR HFA/PROVENTIL HFA/VENTOLIN HFA) 108 (90 Base) MCG/ACT inhaler Inhale 2 puffs into the lungs every 6 hours as needed for shortness of breath / dyspnea or wheezing 18 g 3    fexofenadine (ALLEGRA) 180 MG tablet Take 180 mg by mouth daily      fluticasone-salmeterol (ADVAIR) 100-50 MCG/ACT inhaler Inhale 1 puff into the lungs every 12 hours 3 each 3    fluticasone-salmeterol (ADVAIR) 100-50 MCG/ACT inhaler Inhale 1 puff into the lungs every 12 hours 1 each 4    levothyroxine (SYNTHROID/LEVOTHROID) 125 MCG tablet MON to SAT 2 tablet/day; SUN 1.5 tablet 162 tablet 4    Probiotic Product (PROBIOTIC ADVANCED PO)       triamcinolone (KENALOG) 0.1 % external ointment Apply topically 2 times daily 30 g 1     No Known Allergies  Recent Labs   Lab Test 10/30/24  0902 07/15/24  0934 06/26/23  0945 06/29/22  1403 04/13/22  0741 04/13/22  0741 12/08/20  0849 02/04/20  1434 09/13/19  0848   *  --   --   --   --  156* 130*  --  138*   HDL 46  --   --   --   --  51 48  --  51   TRIG 210*  --   --   --   --  91 79  --  79   ALT 34  --   --  32  --  36 40  --  43   CR 1.13  --   --  0.98   < > 1.02 1.02  --  1.01   GFRESTIMATED 81  --   --  >90   < > >90 89  --  >90   GFRESTBLACK  --   --   --   --   --   --  >90  --  >90   POTASSIUM 4.4  --   --  3.9   < > 4.6 4.4  --  4.4   TSH  --  0.76 0.79 0.19*  --   --  0.09*   < >  --     < > = values in this interval not displayed.          Review of Systems  Constitutional, neuro, ENT, endocrine, pulmonary, cardiac, gastrointestinal, genitourinary, musculoskeletal, integument and psychiatric systems are negative, except as otherwise noted.     Objective    Exam  /78 (BP Location: Right arm, Patient Position: Sitting, Cuff Size: Adult Large)   Pulse 72   Temp 96.8  F (36  C) (Temporal)   Resp 16   Ht 2.043 m (6' 8.43\")   Wt 112.8 kg (248 lb 11.2 oz)   SpO2 96%   BMI 27.03 kg/m   " "  Estimated body mass index is 27.03 kg/m  as calculated from the following:    Height as of this encounter: 2.043 m (6' 8.43\").    Weight as of this encounter: 112.8 kg (248 lb 11.2 oz).    Physical Exam  GENERAL: alert and no distress  EYES: Eyes grossly normal to inspection, PERRL and conjunctivae and sclerae normal  HENT: ear canals and TM's normal, nose and mouth without ulcers or lesions  NECK: no adenopathy, no asymmetry, masses, or scars  RESP: lungs clear to auscultation - no rales, rhonchi or wheezes  CV: regular rate and rhythm, normal S1 S2, no S3 or S4, no murmur, click or rub, no peripheral edema  ABDOMEN: soft, nontender, no hepatosplenomegaly, no masses and bowel sounds normal  MS: no gross musculoskeletal defects noted, no edema  SKIN: no suspicious lesions or rashes some hypopigmented spot around 1 cm on his distal right lower extremity with some blood scab-old.  NEURO: Normal strength and tone, mentation intact and speech normal  PSYCH: mentation appears normal, affect normal/bright        Signed Electronically by: Camilla Mathur MD    "

## 2024-11-04 DIAGNOSIS — E89.0 POSTSURGICAL HYPOTHYROIDISM: ICD-10-CM

## 2024-11-04 DIAGNOSIS — C73 PAPILLARY THYROID CARCINOMA (H): ICD-10-CM

## 2024-11-07 RX ORDER — LEVOTHYROXINE SODIUM 125 UG/1
TABLET ORAL
Refills: 0 | OUTPATIENT
Start: 2024-11-07

## 2024-12-08 ASSESSMENT — ASTHMA QUESTIONNAIRES
QUESTION_3 LAST FOUR WEEKS HOW OFTEN DID YOUR ASTHMA SYMPTOMS (WHEEZING, COUGHING, SHORTNESS OF BREATH, CHEST TIGHTNESS OR PAIN) WAKE YOU UP AT NIGHT OR EARLIER THAN USUAL IN THE MORNING: NOT AT ALL
QUESTION_1 LAST FOUR WEEKS HOW MUCH OF THE TIME DID YOUR ASTHMA KEEP YOU FROM GETTING AS MUCH DONE AT WORK, SCHOOL OR AT HOME: NONE OF THE TIME
QUESTION_5 LAST FOUR WEEKS HOW WOULD YOU RATE YOUR ASTHMA CONTROL: WELL CONTROLLED
QUESTION_2 LAST FOUR WEEKS HOW OFTEN HAVE YOU HAD SHORTNESS OF BREATH: NOT AT ALL
ACT_TOTALSCORE: 24
ACT_TOTALSCORE: 24
QUESTION_4 LAST FOUR WEEKS HOW OFTEN HAVE YOU USED YOUR RESCUE INHALER OR NEBULIZER MEDICATION (SUCH AS ALBUTEROL): NOT AT ALL

## 2024-12-09 ENCOUNTER — OFFICE VISIT (OUTPATIENT)
Dept: ALLERGY | Facility: CLINIC | Age: 49
End: 2024-12-09
Attending: INTERNAL MEDICINE
Payer: COMMERCIAL

## 2024-12-09 VITALS
OXYGEN SATURATION: 97 % | BODY MASS INDEX: 27.34 KG/M2 | WEIGHT: 251.6 LBS | SYSTOLIC BLOOD PRESSURE: 126 MMHG | DIASTOLIC BLOOD PRESSURE: 76 MMHG | HEART RATE: 69 BPM

## 2024-12-09 DIAGNOSIS — J45.30 MILD PERSISTENT ASTHMA WITHOUT COMPLICATION: ICD-10-CM

## 2024-12-09 DIAGNOSIS — J30.1 SEASONAL ALLERGIC RHINITIS DUE TO POLLEN: Primary | ICD-10-CM

## 2024-12-09 DIAGNOSIS — L20.84 INTRINSIC ATOPIC DERMATITIS: ICD-10-CM

## 2024-12-09 PROCEDURE — 99214 OFFICE O/P EST MOD 30 MIN: CPT | Performed by: INTERNAL MEDICINE

## 2024-12-09 RX ORDER — AMBROSIA ARTEMISIIFOLIA POLLEN 12 [AMB'A'1'U]/1
1 TABLET SUBLINGUAL DAILY
Qty: 30 TABLET | Refills: 11 | Status: SHIPPED | OUTPATIENT
Start: 2024-12-09

## 2024-12-09 RX ORDER — TRIAMCINOLONE ACETONIDE 1 MG/G
OINTMENT TOPICAL 2 TIMES DAILY
Qty: 80 G | Refills: 1 | Status: SHIPPED | OUTPATIENT
Start: 2024-12-09

## 2024-12-09 NOTE — PATIENT INSTRUCTIONS
White cotton gloves  Cerave daily at night  Triamcinolone twice daily x 2 weeks, then on weekends  Call if not improving, will consider mometasone  [employer] website  Will need Epipen if starting              Allergy Staff Appt Hours Shot Hours Location       Physician   Sergio Dorantes MD      Support Staff   MALICK Torres RN, MA Emily J., MA      Mondays Tuesdays Thursdays and Fridays:      Estefania 7-5      Wednesdays         Close                Mondays, Tuesdays and Fridays:  7:20 - 3:40              [facility]  [address]  [place] [address]  Allergy appointment  line: [phone]    Pulmonary Function Scheduling:  [place]: [phone]           Questions about cost of your care  For questions about your cost of your visit, procedure, lab or imaging contact: [employer] Price Line [phone] or visit:  [email or web address]    Prescription Assistance  If you need assistance with your prescriptions (cost, coverage, etc) please contact: [employer] Prescription Assistance Program [phone]    Important Scheduling Information  All visits for food challenges, medication/drug allergy testing, and drug challenges MUST be scheduled through the allergy clinic nurse. Please contact them via [employer] or by calling the clinic at [phone] and asking to speak with an allergy nurse. They will provide additional information and instructions for the appointment. Discontinue oral antihistamines 7 days prior to the appointment. Discontinue nasal and ocular antihistamines 1 day prior to the appointment.    Appointments for skin testing: Appointment will last approximately 45 minutes.  Please call the appointment line for your clinic to schedule.  Discontinue oral antihistamines 7 days prior to the appointment.  Discontinue nasal and ocular antihistamines 1 days prior to appointment.    Thank you for trusting us with your  care. Please feel free to contact us with any questions or concerns you may have.

## 2024-12-09 NOTE — PROGRESS NOTES
José Augustine was seen in the Allergy Clinic at [facility].    José Augustine is a 49 year old male being seen today for ongoing evaluation of seasonal allergic rhinitis due to pollen as well as mild persistent asthma and atopic dermatitis.    For his seasonal allergy symptoms he was receiving sublingual immunotherapy from [facility].  He did this for approximately 18 months and did not notice significant change.  This April through October and he used Allegra on a daily basis that he did find to be beneficial.  He finds that he is more crabby or irritable in August for 1 to 2 months.  He would be interested in immunotherapy and would like to ask a few more questions in regards to this therapy.    Advair was reduced from the medium dose inhaler to the low-dose inhaler and initially he noticed some subtle lung changes, initially, but now feels great.   His spirometry was assessed 8/2023, and his lung function actually improved from 1 year ago.  FEV1 was 109% predicted % of predicted.      He did use albuterol a few x 4 to 6 weeks ago when he had upper respiratory infection.    He has had increased symptoms of hand dermatitis. Triamcinolone has worked well in the past.  He is not using that persistently at this time.    Since the last visit the patient has been feeling good.     Past Medical History:   Diagnosis Date    Allergic state     Asthma age 12    Eczema     Lyme disease 07/2021    Papillary thyroid carcinoma (H) 12/09/2013    right 1.5 cm, ETE, LN+, BRAF+    Postsurgical hypothyroidism 12/09/2013     Family History   Problem Relation Age of Onset    Cancer Father         skin    Diabetes Other     Thyroid Cancer No family hx of     Thyroid Disease No family hx of      Past Surgical History:   Procedure Laterality Date    ORTHOPEDIC SURGERY      shoulder labral repair    THYROIDECTOMY  12/9/2013    Procedure: THYROIDECTOMY;  Total Thyroidectomy, Central Neck Dissection ;  Surgeon:  Pillo Henley MD;  Location:  OR         Current Outpatient Medications:     albuterol (PROAIR HFA/PROVENTIL HFA/VENTOLIN HFA) 108 (90 Base) MCG/ACT inhaler, Inhale 2 puffs into the lungs every 6 hours as needed for shortness of breath / dyspnea or wheezing, Disp: 18 g, Rfl: 3    fluticasone-salmeterol (ADVAIR) 100-50 MCG/ACT inhaler, Inhale 1 puff into the lungs every 12 hours, Disp: 1 each, Rfl: 4    levothyroxine (SYNTHROID/LEVOTHROID) 125 MCG tablet, MON to SAT 2 tablet/day; SUN 1.5 tablet, Disp: 162 tablet, Rfl: 4    Probiotic Product (PROBIOTIC ADVANCED PO), , Disp: , Rfl:     short ragweed pollen allergen extract (RAGWITEK) 12 AMB A 1-U SUBL sublingual tablet, Place 1 tablet under the tongue daily., Disp: 30 tablet, Rfl: 11    triamcinolone (KENALOG) 0.1 % external ointment, Apply topically 2 times daily., Disp: 80 g, Rfl: 1    fexofenadine (ALLEGRA) 180 MG tablet, Take 180 mg by mouth daily (Patient not taking: Reported on 12/9/2024), Disp: , Rfl:     fluticasone-salmeterol (ADVAIR) 100-50 MCG/ACT inhaler, Inhale 1 puff into the lungs every 12 hours, Disp: 3 each, Rfl: 3  No Known Allergies      EXAM:   /76   Pulse 69   Wt 114.1 kg (251 lb 9.6 oz)   SpO2 97%   BMI 27.34 kg/m      Constitutional:       General: He is not in acute distress.     Appearance: Normal appearance. He is not ill-appearing.   HENT:      Head: Normocephalic and atraumatic.    Eyes:      General:         Right eye: No discharge.         Left eye: No discharge.   Skin:     Findings: He does have bilateral eczematous changes both hands.  He does have 1 fissure one of his fingers.  He has eczematous patches.  Neurological:      General: No focal deficit present.      Mental Status: He is alert. Mental status is at baseline.   Psychiatric:         Mood and Affect: Mood normal.         Behavior: Behavior normal.        ASSESSMENT/PLAN:  José Augustine is a 49 year old male seen today for ongoing evaluation of seasonal allergic  rhinitis, eczema as well as mild persistent asthma.  He is known to be allergic to ragweed and Alternaria only based on blood testing.  He would be interested in sublingual immunotherapy for ragweed.  He does have some allergy symptoms that do not respond well to Allegra only.  The first dose would be given in the office.  Will see if the [employer] is approved and then we will discuss the first dose in the office    White cotton gloves  Cerave daily at night  Triamcinolone twice daily x 2 weeks, then on weekends  Call if eczema is not improving, will consider mometasone  [employer] website  Will need Epipen if starting    Follow-up for the first dose of the ragweed sublingual as long as it is approved.      Thank you for allowing me to participate in the care of José Augustine.      I spent 35 minutes on the date of the encounter doing chart review, history and exam, documentation and further coordination as noted above exclusive of separately reported interpretations    Sergio Dorantes MD  Allergy/Immunology  [facility]

## 2024-12-09 NOTE — LETTER
12/9/2024      José Augustine  [address] [facility] [address]      Dear Colleague,    Thank you for referring your patient, José Augustine, to the [facility] [place]. Please see a copy of my visit note below.    José Augustine was seen in the Allergy Clinic at [facility].    José Augustine is a 49 year old male being seen today for ongoing evaluation of seasonal allergic rhinitis due to pollen as well as mild persistent asthma and atopic dermatitis.    For his seasonal allergy symptoms he was receiving sublingual immunotherapy from [facility].  He did this for approximately 18 months and did not notice significant change.  This April through October and he used Allegra on a daily basis that he did find to be beneficial.  He finds that he is more crabby or irritable in August for 1 to 2 months.  He would be interested in immunotherapy and would like to ask a few more questions in regards to this therapy.    Advair was reduced from the medium dose inhaler to the low-dose inhaler and initially he noticed some subtle lung changes, initially, but now feels great.   His spirometry was assessed 8/2023, and his lung function actually improved from 1 year ago.  FEV1 was 109% predicted % of predicted.      He did use albuterol a few x 4 to 6 weeks ago when he had upper respiratory infection.    He has had increased symptoms of hand dermatitis. Triamcinolone has worked well in the past.  He is not using that persistently at this time.    Since the last visit the patient has been feeling good.     Past Medical History:   Diagnosis Date     Allergic state      Asthma age 12     Eczema      Lyme disease 07/2021     Papillary thyroid carcinoma (H) 12/09/2013    right 1.5 cm, ETE, LN+, BRAF+     Postsurgical hypothyroidism 12/09/2013     Family History   Problem Relation Age of Onset     Cancer Father         skin     Diabetes Other      Thyroid Cancer No family hx of       Thyroid Disease No family hx of      Past Surgical History:   Procedure Laterality Date     ORTHOPEDIC SURGERY      shoulder labral repair     THYROIDECTOMY  12/9/2013    Procedure: THYROIDECTOMY;  Total Thyroidectomy, Central Neck Dissection ;  Surgeon: Pillo Henley MD;  Location:  OR         Current Outpatient Medications:      albuterol (PROAIR HFA/PROVENTIL HFA/VENTOLIN HFA) 108 (90 Base) MCG/ACT inhaler, Inhale 2 puffs into the lungs every 6 hours as needed for shortness of breath / dyspnea or wheezing, Disp: 18 g, Rfl: 3     fluticasone-salmeterol (ADVAIR) 100-50 MCG/ACT inhaler, Inhale 1 puff into the lungs every 12 hours, Disp: 1 each, Rfl: 4     levothyroxine (SYNTHROID/LEVOTHROID) 125 MCG tablet, MON to SAT 2 tablet/day; SUN 1.5 tablet, Disp: 162 tablet, Rfl: 4     Probiotic Product (PROBIOTIC ADVANCED PO), , Disp: , Rfl:      short ragweed pollen allergen extract (RAGWITEK) 12 AMB A 1-U SUBL sublingual tablet, Place 1 tablet under the tongue daily., Disp: 30 tablet, Rfl: 11     triamcinolone (KENALOG) 0.1 % external ointment, Apply topically 2 times daily., Disp: 80 g, Rfl: 1     fexofenadine (ALLEGRA) 180 MG tablet, Take 180 mg by mouth daily (Patient not taking: Reported on 12/9/2024), Disp: , Rfl:      fluticasone-salmeterol (ADVAIR) 100-50 MCG/ACT inhaler, Inhale 1 puff into the lungs every 12 hours, Disp: 3 each, Rfl: 3  No Known Allergies      EXAM:   /76   Pulse 69   Wt 114.1 kg (251 lb 9.6 oz)   SpO2 97%   BMI 27.34 kg/m      Constitutional:       General: He is not in acute distress.     Appearance: Normal appearance. He is not ill-appearing.   HENT:      Head: Normocephalic and atraumatic.    Eyes:      General:         Right eye: No discharge.         Left eye: No discharge.   Skin:     Findings: He does have bilateral eczematous changes both hands.  He does have 1 fissure one of his fingers.  He has eczematous patches.  Neurological:      General: No focal deficit present.       Mental Status: He is alert. Mental status is at baseline.   Psychiatric:         Mood and Affect: Mood normal.         Behavior: Behavior normal.        ASSESSMENT/PLAN:  José Augustine is a 49 year old male seen today for ongoing evaluation of seasonal allergic rhinitis, eczema as well as mild persistent asthma.  He is known to be allergic to ragweed and Alternaria only based on blood testing.  He would be interested in sublingual immunotherapy for ragweed.  He does have some allergy symptoms that do not respond well to Allegra only.  The first dose would be given in the office.  Will see if the [employer] is approved and then we will discuss the first dose in the office    White cotton gloves  Cerave daily at night  Triamcinolone twice daily x 2 weeks, then on weekends  Call if eczema is not improving, will consider mometasone  [employer] website  Will need Epipen if starting    Follow-up for the first dose of the ragweed sublingual as long as it is approved.      Thank you for allowing me to participate in the care of José Augustine.      I spent 35 minutes on the date of the encounter doing chart review, history and exam, documentation and further coordination as noted above exclusive of separately reported interpretations    Sergio Dorantes MD  Allergy/Immunology  [facility]      Again, thank you for allowing me to participate in the care of your patient.        Sincerely,        Sergio Dorantes MD

## 2024-12-10 ENCOUNTER — TELEPHONE (OUTPATIENT)
Dept: ALLERGY | Facility: CLINIC | Age: 49
End: 2024-12-10
Payer: COMMERCIAL

## 2024-12-10 NOTE — TELEPHONE ENCOUNTER
Prior Authorization Retail Medication Request    Medication/Dose: short ragweed pollen allergen extract (RAGWITEK) 12 AMB A 1-U SUBL sublingual tablet  Diagnosis and ICD code (if different than what is on RX):  J30.1   New/renewal/insurance change PA/secondary ins. PA: New  Previously Tried and Failed:    Rationale:      Insurance   Primary: [employer]   Insurance ID:  [id number]     Secondary (if applicable):  Insurance ID:      Pharmacy Information (if different than what is on RX)  Name:  [facility]  Phone:  [phone]   Fax:[phone]     Clinic Information  Preferred routing pool for dept communication: [facility]

## 2024-12-12 NOTE — TELEPHONE ENCOUNTER
PA Initiation    Medication: RAGWITEK 12 AMB A 1-U [facility]  Insurance Company: [employer] - Phone [phone] Fax [phone]  Pharmacy Filling the Rx: [id number] - [place], [place] - [address]  Filling Pharmacy Phone: [phone]  Filling Pharmacy Fax: [phone]  Start Date: 12/12/2024

## 2024-12-17 NOTE — TELEPHONE ENCOUNTER
PRIOR AUTHORIZATION DENIED    Medication: RAGWITEK 12 AMB A 1-U [facility]  Insurance Company: [employer] - Phone [phone] Fax [phone]  Denial Date: 12/16/2024  Denial Reason(s):     Appeal Information:     Patient Notified: NO

## 2025-01-09 DIAGNOSIS — E89.0 POSTSURGICAL HYPOTHYROIDISM: ICD-10-CM

## 2025-01-09 DIAGNOSIS — C73 PAPILLARY THYROID CARCINOMA (H): ICD-10-CM

## 2025-01-15 RX ORDER — LEVOTHYROXINE SODIUM 125 UG/1
TABLET ORAL
Qty: 162 TABLET | Refills: 1 | Status: SHIPPED | OUTPATIENT
Start: 2025-01-15

## 2025-01-15 NOTE — TELEPHONE ENCOUNTER
pharm transfer: remaining rf sent    levothyroxine (SYNTHROID/LEVOTHROID) 125 MCG tablet   162 tablet 4 7/29/2024 -- No  Sig: MON to SAT 2 tablet/day; SUN 1.5 tablet   Prescribing Provider's NPI: [id number]  Nettie Carolina

## 2025-01-16 ENCOUNTER — TELEPHONE (OUTPATIENT)
Dept: GASTROENTEROLOGY | Facility: CLINIC | Age: 50
End: 2025-01-16
Payer: COMMERCIAL

## 2025-01-16 NOTE — TELEPHONE ENCOUNTER
"Endoscopy Scheduling Screen    Have you had any respiratory illness or flu-like symptoms in the last 10 days?  No    What is your communication preference for Instructions and/or Bowel Prep?   MyChart    What insurance is in the chart?  Other:      Ordering/Referring Provider:     PAMELLA HURD      (If ordering provider performs procedure, schedule with ordering provider unless otherwise instructed. )    BMI: Estimated body mass index is 27.34 kg/m  as calculated from the following:    Height as of 10/30/24: 2.043 m (6' 8.43\").    Weight as of 12/9/24: 114.1 kg (251 lb 9.6 oz).     Sedation Ordered  moderate sedation.   If patient BMI > 50 do not schedule in ASC.    If patient BMI > 45 do not schedule at [facility].    Are you taking methadone or Suboxone?  NO, No RN review required.    Have you been diagnosed and are being treated for severe PTSD or severe anxiety?  NO, No RN review required.    Are you taking any prescription medications for pain 3 or more times per week?   NO, No RN review required.    Do you have a history of malignant hyperthermia?  No    (Females) Are you currently pregnant?        Have you been diagnosed or told you have pulmonary hypertension?   No    Do you have an LVAD?  No    Have you been told you have moderate to severe sleep apnea?  No.    Have you been told you have COPD, asthma, or any other lung disease?  Yes     What breathing problems do you have?  Asthma     Do you use home oxygen?  No    Have your breathing problems required an ED visit or hospitalization in the last year?  No.    Do you have any heart conditions?  No     Have you ever had or are you waiting for an organ transplant?  No. Continue scheduling, no site restrictions.    Have you had a stroke or transient ischemic attack (TIA aka \"mini stroke\" in the last 6 months?   No    Have you been diagnosed with or been told you have cirrhosis of the liver?   No.    Are you currently on dialysis?   No    Do you need " "assistance transferring?   No    BMI: Estimated body mass index is 27.34 kg/m  as calculated from the following:    Height as of 10/30/24: 2.043 m (6' 8.43\").    Weight as of 12/9/24: 114.1 kg (251 lb 9.6 oz).     Is patients BMI > 40 and scheduling location [facility]?  No    Do you take an injectable or oral medication for weight loss or diabetes (excluding insulin)?  No    Do you take the medication Naltrexone?  No    Do you take blood thinners?  No       Prep   Are you currently on dialysis or do you have chronic kidney disease?  No    Do you have a diagnosis of diabetes?  No    Do you have a diagnosis of cystic fibrosis (CF)?  No    On a regular basis do you go 3 -5 days between bowel movements?  No    BMI > 40?  No    Preferred Pharmacy:    [id number] [address] [address]  Phone: [phone] Fax: [phone]    Final Scheduling Details     Procedure scheduled  Colonoscopy    Surgeon:  KETURAH     Date of procedure:  4/4     Pre-OP / PAC:   No - Not required for this site.    Location  [facility] - Per order.    Sedation   Moderate Sedation - Per order.      Patient Reminders:   You will receive a call from a Nurse to review instructions and health history.  This assessment must be completed prior to your procedure.  Failure to complete the Nurse assessment may result in the procedure being cancelled.      On the day of your procedure, please designate an adult(s) who can drive you home stay with you for the next 24 hours. The medicines used in the exam will make you sleepy. You will not be able to drive.      You cannot take public transportation, ride share services, or non-medical taxi service without a responsible caregiver.  Medical transport services are allowed with the requirement that a responsible caregiver will receive you at your destination.  We require that drivers and caregivers are confirmed prior to your procedure.  "

## 2025-02-10 DIAGNOSIS — J45.30 MILD PERSISTENT ASTHMA WITHOUT COMPLICATION: ICD-10-CM

## 2025-02-10 RX ORDER — FLUTICASONE PROPIONATE AND SALMETEROL 100; 50 UG/1; UG/1
1 POWDER RESPIRATORY (INHALATION) EVERY 12 HOURS
Qty: 1 EACH | Refills: 4 | Status: CANCELLED | OUTPATIENT
Start: 2025-02-10

## 2025-02-10 RX ORDER — FLUTICASONE PROPIONATE AND SALMETEROL 100; 50 UG/1; UG/1
1 POWDER RESPIRATORY (INHALATION) EVERY 12 HOURS
Qty: 3 EACH | Refills: 3 | Status: SHIPPED | OUTPATIENT
Start: 2025-02-10

## 2025-02-10 NOTE — TELEPHONE ENCOUNTER
Requested Prescriptions   Pending Prescriptions Disp Refills    fluticasone-salmeterol (ADVAIR) 100-50 MCG/ACT inhaler 1 each 4     Sig: Inhale 1 puff into the lungs every 12 hours.       There is no refill protocol information for this order        Last Written Prescription Date:  12/08/2023  Last Fill Quantity: 3 each,  # refills: 3   Last office visit: 12/9/2024 ; last virtual visit: Visit date not found with prescribing provider:  Sergio Dorantes MD    Future Office Visit:  none

## 2025-02-10 NOTE — TELEPHONE ENCOUNTER
Requested Prescriptions   Pending Prescriptions Disp Refills    fluticasone-salmeterol (ADVAIR) 100-50 MCG/ACT inhaler 1 each 4     Sig: Inhale 1 puff into the lungs every 12 hours.       Inhaled Steroids Protocol Passed - 2/10/2025 11:38 AM        Passed - Patient is age 12 or older        Passed - Asthma control assessment score within normal limits in last 6 months     Please review ACT score.           Passed - Medication is active on med list        Passed - Recent (6 mo) or future (90 days) visit within the authorizing provider's specialty     The patient must have completed an in-person or virtual visit within the past 6 months or has a future visit scheduled within the next 90 days with the authorizing provider s specialty.  Urgent care and e-visits do not quality as an office visit for this protocol.          Passed - Medication indicated for associated diagnosis     Medication is associated with one or more of the following diagnoses:    Allergies   Asthma   COPD   Nasal Congestion   Nasal Polyps   Rhinitis   Cystic Fibrosis   Bronchiectasis

## 2025-02-10 NOTE — TELEPHONE ENCOUNTER
Requested Prescriptions   Pending Prescriptions Disp Refills    fluticasone-salmeterol (ADVAIR) 100-50 MCG/ACT inhaler 3 each 3     Sig: Inhale 1 puff into the lungs every 12 hours.       There is no refill protocol information for this order          Last Written Prescription Date:  12/08/2023  Last Fill Quantity: 3 each,  # refills: 3   Last office visit: 12/9/2024 ; last virtual visit: Visit date not found with prescribing provider:  Dr. Sergio Dorantes   Future Office Visit:  None currently scheduled    Filling RX per passed [facility] protocol.    Natalia Villalba RN

## 2025-03-11 ENCOUNTER — TELEPHONE (OUTPATIENT)
Dept: GASTROENTEROLOGY | Facility: CLINIC | Age: 50
End: 2025-03-11
Payer: COMMERCIAL

## 2025-03-11 NOTE — TELEPHONE ENCOUNTER
Caller: José    Reason for Reschedule/Cancellation (please be detailed, any staff messages or encounters to note?):   Melecio kelly block    Did you cancel or rescheduled an EUS procedure? No.    Is screening questionnaire older than 3 months from the reschedule date.   If Yes, please complete screening questionnaire. No    Prior to reschedule please review:  Ordering Provider: PAMELLA HURD   Sedation Determined: CS  Does patient have any ASC Exclusions, please identify?: no    Notes on Cancelled Procedure:  Procedure: Lower Endoscopy [Colonoscopy]   Date: 4/4  Location: [facility]; [address] [place] [address] [place] [address]   Surgeon: Melecio    Rescheduled: No, LVM and sent message    CASE IN DEPOT

## 2025-03-12 NOTE — TELEPHONE ENCOUNTER
Caller: José Augustine    Rescheduled: Yes,   Procedure: Lower Endoscopy [Colonoscopy]    Date: 4/25/25   Location: [facility]; [facility] [address] [place] [address]    Surgeon: KETURAH   Sedation Level Scheduled  CS ,  Reason for Sedation Level PER ORDER   Instructions updated and sent: YES     Does patient need PAC or Pre -Op Rescheduled? : N

## 2025-03-18 ENCOUNTER — MYC MEDICAL ADVICE (OUTPATIENT)
Dept: ALLERGY | Facility: CLINIC | Age: 50
End: 2025-03-18
Payer: COMMERCIAL

## 2025-04-09 ENCOUNTER — TELEPHONE (OUTPATIENT)
Dept: GASTROENTEROLOGY | Facility: CLINIC | Age: 50
End: 2025-04-09
Payer: COMMERCIAL

## 2025-04-09 NOTE — TELEPHONE ENCOUNTER
Rescheduled Colonoscopy  Due to provider out of office.Melecio marina released    Pre visit planning completed.      Procedure details:    Patient scheduled for Colonoscopy on 4-25-25.     Arrival time: 0715. Procedure time 0800    Facility location: [facility]; [facility] [place] [address]. Check in location: 1st Floor [facility].     Sedation type: Conscious sedation     Pre op exam needed? No.    Indication for procedure: screening      Chart review:     Electronic implanted devices? No    Recent diagnosis of diverticulitis within the last 6 weeks? No      Medication review:    Diabetic? No    Anticoagulants? No    Weight loss medication/injectable? No GLP-1 medication per patient's medication list. Nursing to verify with pre-assessment call.    Other medication HOLDING recommendations:  N/A      Prep for procedure:     Bowel prep recommendation: Standard Miralax.   Due to: standard bowel prep    Prep instructions sent via [employer]         Bernarda Finney RN  Endoscopy Procedure Pre Assessment   [phone] option 3

## 2025-04-10 NOTE — TELEPHONE ENCOUNTER
Pre assessment completed for upcoming procedure.   (Please see previous telephone encounter notes for complete details)      Procedure details:    Arrival time and facility location reviewed.    Pre op exam needed? No.    Designated  policy reviewed. Instructed to have someone stay 6  hours post procedure.       Medication review:    Medications reviewed. Please see supporting documentation below. Holding recommendations discussed (if applicable).       Prep for procedure:     Patient declined to review procedure prep instructions on the phone. Instructed patient to review the procedure prep instructions at least 7 days prior to procedure due to necessary dietary modifications. NPO instructions reviewed.    Patient was instructed to call if any questions or concerns arise.         Any additional information needed:  N/A      Patient verbalized understanding and had no questions or concerns at this time.      Sara Guzmán RN  Endoscopy Procedure Pre Assessment   [phone] option 3

## 2025-04-25 ENCOUNTER — HOSPITAL ENCOUNTER (OUTPATIENT)
Facility: CLINIC | Age: 50
Discharge: HOME | End: 2025-04-25
Attending: INTERNAL MEDICINE | Admitting: INTERNAL MEDICINE
Payer: COMMERCIAL

## 2025-04-25 VITALS
SYSTOLIC BLOOD PRESSURE: 109 MMHG | RESPIRATION RATE: 14 BRPM | BODY MASS INDEX: 28.35 KG/M2 | DIASTOLIC BLOOD PRESSURE: 77 MMHG | OXYGEN SATURATION: 96 % | WEIGHT: 245 LBS | HEIGHT: 78 IN | HEART RATE: 61 BPM

## 2025-04-25 LAB — COLONOSCOPY: NORMAL

## 2025-04-25 PROCEDURE — 88305 TISSUE EXAM BY PATHOLOGIST: CPT | Mod: 26 | Performed by: PATHOLOGY

## 2025-04-25 PROCEDURE — 88305 TISSUE EXAM BY PATHOLOGIST: CPT | Mod: TC | Performed by: INTERNAL MEDICINE

## 2025-04-25 PROCEDURE — G0500 MOD SEDAT ENDO SERVICE >5YRS: HCPCS | Mod: PT | Performed by: INTERNAL MEDICINE

## 2025-04-25 PROCEDURE — 250N000011 HC RX IP 250 OP 636: Performed by: INTERNAL MEDICINE

## 2025-04-25 PROCEDURE — 45380 COLONOSCOPY AND BIOPSY: CPT | Performed by: INTERNAL MEDICINE

## 2025-04-25 RX ORDER — FENTANYL CITRATE 50 UG/ML
INJECTION, SOLUTION INTRAMUSCULAR; INTRAVENOUS PRN
Status: DISCONTINUED | OUTPATIENT
Start: 2025-04-25 | End: 2025-04-25 | Stop reason: HOSPADM

## 2025-04-25 ASSESSMENT — ACTIVITIES OF DAILY LIVING (ADL)
ADLS_ACUITY_SCORE: 41
ADLS_ACUITY_SCORE: 41

## 2025-04-28 LAB
PATH REPORT.COMMENTS IMP SPEC: NORMAL
PATH REPORT.COMMENTS IMP SPEC: NORMAL
PATH REPORT.FINAL DX SPEC: NORMAL
PATH REPORT.GROSS SPEC: NORMAL
PATH REPORT.MICROSCOPIC SPEC OTHER STN: NORMAL
PATH REPORT.RELEVANT HX SPEC: NORMAL
PHOTO IMAGE: NORMAL

## 2025-05-08 ASSESSMENT — ASTHMA QUESTIONNAIRES
QUESTION_4 LAST FOUR WEEKS HOW OFTEN HAVE YOU USED YOUR RESCUE INHALER OR NEBULIZER MEDICATION (SUCH AS ALBUTEROL): ONCE A WEEK OR LESS
QUESTION_1 LAST FOUR WEEKS HOW MUCH OF THE TIME DID YOUR ASTHMA KEEP YOU FROM GETTING AS MUCH DONE AT WORK, SCHOOL OR AT HOME: NONE OF THE TIME
QUESTION_2 LAST FOUR WEEKS HOW OFTEN HAVE YOU HAD SHORTNESS OF BREATH: ONCE OR TWICE A WEEK
QUESTION_3 LAST FOUR WEEKS HOW OFTEN DID YOUR ASTHMA SYMPTOMS (WHEEZING, COUGHING, SHORTNESS OF BREATH, CHEST TIGHTNESS OR PAIN) WAKE YOU UP AT NIGHT OR EARLIER THAN USUAL IN THE MORNING: NOT AT ALL
QUESTION_5 LAST FOUR WEEKS HOW WOULD YOU RATE YOUR ASTHMA CONTROL: SOMEWHAT CONTROLLED

## 2025-05-13 ENCOUNTER — OFFICE VISIT (OUTPATIENT)
Dept: ALLERGY | Facility: CLINIC | Age: 50
End: 2025-05-13
Payer: COMMERCIAL

## 2025-05-13 VITALS
DIASTOLIC BLOOD PRESSURE: 82 MMHG | HEART RATE: 64 BPM | OXYGEN SATURATION: 97 % | WEIGHT: 245.1 LBS | SYSTOLIC BLOOD PRESSURE: 124 MMHG | BODY MASS INDEX: 27.61 KG/M2

## 2025-05-13 DIAGNOSIS — L20.84 INTRINSIC ATOPIC DERMATITIS: ICD-10-CM

## 2025-05-13 DIAGNOSIS — J30.1 SEASONAL ALLERGIC RHINITIS DUE TO POLLEN: ICD-10-CM

## 2025-05-13 DIAGNOSIS — J45.30 MILD PERSISTENT ASTHMA WITHOUT COMPLICATION: ICD-10-CM

## 2025-05-13 PROCEDURE — 3074F SYST BP LT 130 MM HG: CPT | Performed by: INTERNAL MEDICINE

## 2025-05-13 PROCEDURE — 99214 OFFICE O/P EST MOD 30 MIN: CPT | Performed by: INTERNAL MEDICINE

## 2025-05-13 PROCEDURE — 3079F DIAST BP 80-89 MM HG: CPT | Performed by: INTERNAL MEDICINE

## 2025-05-13 RX ORDER — EPINEPHRINE 0.3 MG/.3ML
0.3 INJECTION SUBCUTANEOUS PRN
Qty: 2 EACH | Refills: 2 | Status: SHIPPED | OUTPATIENT
Start: 2025-05-13

## 2025-05-13 ASSESSMENT — ASTHMA QUESTIONNAIRES: ACT_TOTALSCORE: 21

## 2025-05-13 NOTE — PATIENT INSTRUCTIONS
Allergy Staff Appt Hours Shot Hours Location       Physician   Sergio Dorantes MD      Support Staff   MALICK Torres RN, MA Emily J., MA      Mondays Tuesdays Thursdays and Fridays:      Estefania 7-5      Wednesdays         Close                Mondays, Tuesdays and Fridays:  7:20 - 3:40              [facility]  [address]  [place] [address]  Allergy appointment  line: [phone]    Pulmonary Function Scheduling:  [place]: [phone]           Questions about cost of your care  For questions about your cost of your visit, procedure, lab or imaging contact: [employer] [phone] or visit:  [email or web address]    Prescription Assistance  If you need assistance with your prescriptions (cost, coverage, etc) please contact: [employer] Prescription Assistance Program [phone]    Important Scheduling Information  All visits for food challenges, medication/drug allergy testing, and drug challenges MUST be scheduled through the allergy clinic nurse. Please contact them via [place] or by calling the clinic at [phone] and asking to speak with an allergy nurse. They will provide additional information and instructions for the appointment. Discontinue oral antihistamines 7 days prior to the appointment. Discontinue nasal and ocular antihistamines 1 day prior to the appointment.    Appointments for skin testing: Appointment will last approximately 45 minutes.  Please call the appointment line for your clinic to schedule.  Discontinue oral antihistamines 7 days prior to the appointment.  Discontinue nasal and ocular antihistamines 1 days prior to appointment.    Thank you for trusting us with your care. Please feel free to contact us with any questions or concerns you may have.

## 2025-05-13 NOTE — LETTER
5/13/2025      José Augustine  [address] [facility] [address]      Dear Colleague,    Thank you for referring your patient, José Augustine, to the [facility] [place]. Please see a copy of my visit note below.    José Augustine was seen in the Allergy Clinic at [facility].    José Augustine is a 49 year old male being seen today for ongoing evaluation of Seasonal allergic rhinitis as well as asthma.  Also has atopic dermatitis.  He is here to receive his first dose of Ragwitek.    Since the last visit the patient has been with eczema flare up, brain fog, and sinus congestion.  He has had some mild increase in allergy symptoms.  He is known to be allergic to ragweed and Alternaria.  During the fall he does have brain fog which is quite bothersome.    He did have some mild increase in asthma symptoms a month ago which has improved.    PAST ALLERGY HISTORY:    For his seasonal allergy symptoms he was receiving sublingual immunotherapy from [facility].  He did this for approximately 18 months and did not notice significant change.  April through October he used Allegra on a daily basis that he did find to be beneficial.  He finds that he is more crabby or irritable in August for 1 to 2 months.      Advair was reduced from the medium dose inhaler to the low-dose inhaler and initially he noticed some subtle lung changes, initially, but now feels great.   His spirometry was assessed 8/2023, and his lung function actually improved from 1 year ago.  FEV1 was 109% predicted % of predicted.          Past Medical History:   Diagnosis Date     Allergic state      Asthma age 12     Eczema      Lyme disease 07/2021     Papillary thyroid carcinoma (H) 12/09/2013    right 1.5 cm, ETE, LN+, BRAF+     Postsurgical hypothyroidism 12/09/2013     Family History   Problem Relation Age of Onset     Cancer Father         skin     Diabetes Other      Thyroid Cancer No family hx of       Thyroid Disease No family hx of      Past Surgical History:   Procedure Laterality Date     COLONOSCOPY N/A 4/25/2025    Procedure: Colonoscopy;  Surgeon: Travis Majano MD;  Location:  GI     ORTHOPEDIC SURGERY      shoulder labral repair     THYROIDECTOMY  12/9/2013    Procedure: THYROIDECTOMY;  Total Thyroidectomy, Central Neck Dissection ;  Surgeon: Pillo Henley MD;  Location:  OR         Current Outpatient Medications:      albuterol (PROAIR HFA/PROVENTIL HFA/VENTOLIN HFA) 108 (90 Base) MCG/ACT inhaler, Inhale 2 puffs into the lungs every 6 hours as needed for shortness of breath / dyspnea or wheezing, Disp: 18 g, Rfl: 3     EPINEPHrine (ANY BX GENERIC EQUIV) 0.3 MG/0.3ML injection 2-pack, Inject 0.3 mLs (0.3 mg) into the muscle as needed for anaphylaxis., Disp: 2 each, Rfl: 2     fexofenadine (ALLEGRA) 180 MG tablet, Take 180 mg by mouth daily., Disp: , Rfl:      fluticasone-salmeterol (ADVAIR) 100-50 MCG/ACT inhaler, Inhale 1 puff into the lungs every 12 hours., Disp: 3 each, Rfl: 3     levothyroxine (SYNTHROID/LEVOTHROID) 125 MCG tablet, MON to SAT 2 tablet/day; SUN 1.5 tablet (Patient taking differently: Take 125 mcg by mouth daily. MON to SAT 2 tablet/day; SUN 1.5 tablet), Disp: 162 tablet, Rfl: 1     triamcinolone (KENALOG) 0.1 % external ointment, Apply topically 2 times daily., Disp: 80 g, Rfl: 1     fluticasone-salmeterol (ADVAIR) 100-50 MCG/ACT inhaler, Inhale 1 puff into the lungs every 12 hours, Disp: 1 each, Rfl: 4     Probiotic Product (PROBIOTIC ADVANCED PO), , Disp: , Rfl:      short ragweed pollen allergen extract (RAGWITEK) 12 AMB A 1-U SUBL sublingual tablet, Place 1 tablet under the tongue daily., Disp: 30 tablet, Rfl: 11  No Known Allergies      EXAM:   /82 (BP Location: Left arm, Patient Position: Sitting, Cuff Size: Adult Large)   Pulse 64   Wt 111.2 kg (245 lb 1.6 oz)   SpO2 97%   BMI 27.61 kg/m      Constitutional:       General: He is not in acute distress.      Appearance: Normal appearance. He is not ill-appearing.   HENT:      Head: Normocephalic and atraumatic.     Eyes:     General:         Right eye: No discharge.         Left eye: No discharge.   Pulmonary:      Effort: Pulmonary effort is normal.      Breath sounds: Normal breath sounds. No wheezing or rhonchi.   Skin:     General: Skin is warm.      Findings: He does have some eczematous changes in bilateral arms.  Neurological:      General: No focal deficit present.      Mental Status: He is alert. Mental status is at baseline.   Psychiatric:         Mood and Affect: Mood normal.         Behavior: Behavior normal.      WORKUP: Drug Challenge, he was given Ragwitek and observed for 30 minutes.  He did not have any symptoms.    ASSESSMENT/PLAN:  José Augustine is a 49 year old male seen today for evaluation of seasonal allergic rhinitis known to be allergic to ragweed and Alternaria.  He received his first dose of Ragwitek in the office.  An EpiPen was prescribed.  Ragwitek will continue at home.  He is aware that needs to dissolve under the tongue for 1 minute.  No food or drink for 5 minutes.  Will continue daily for 3 years.  Will follow-up in 1 year to see how he responded to the Ragwitek.    Recommended use of triamcinolone for the atopic dermatitis.  He will continue with the Wixela twice daily for the asthma.    Thank you for allowing me to participate in the care of José Augustine.      I spent 30 minutes on the date of the encounter doing chart review, history and exam, documentation and further coordination as noted above exclusive of separately reported interpretations    Sergio Dorantes MD  Allergy/Immunology  [facility]      Again, thank you for allowing me to participate in the care of your patient.        Sincerely,        Sergio Dorantes MD    Electronically signed

## 2025-05-13 NOTE — PROGRESS NOTES
José Augustine was seen in the Allergy Clinic at [facility].    José Augustine is a 49 year old male being seen today for ongoing evaluation of Seasonal allergic rhinitis as well as asthma.  Also has atopic dermatitis.  He is here to receive his first dose of Ragwitek.    Since the last visit the patient has been with eczema flare up, brain fog, and sinus congestion.  He has had some mild increase in allergy symptoms.  He is known to be allergic to ragweed and Alternaria.  During the fall he does have brain fog which is quite bothersome.    He did have some mild increase in asthma symptoms a month ago which has improved.    PAST ALLERGY HISTORY:    For his seasonal allergy symptoms he was receiving sublingual immunotherapy from [facility].  He did this for approximately 18 months and did not notice significant change.  April through October he used Allegra on a daily basis that he did find to be beneficial.  He finds that he is more crabby or irritable in August for 1 to 2 months.      Advair was reduced from the medium dose inhaler to the low-dose inhaler and initially he noticed some subtle lung changes, initially, but now feels great.   His spirometry was assessed 8/2023, and his lung function actually improved from 1 year ago.  FEV1 was 109% predicted % of predicted.          Past Medical History:   Diagnosis Date    Allergic state     Asthma age 12    Eczema     Lyme disease 07/2021    Papillary thyroid carcinoma (H) 12/09/2013    right 1.5 cm, ETE, LN+, BRAF+    Postsurgical hypothyroidism 12/09/2013     Family History   Problem Relation Age of Onset    Cancer Father         skin    Diabetes Other     Thyroid Cancer No family hx of     Thyroid Disease No family hx of      Past Surgical History:   Procedure Laterality Date    COLONOSCOPY N/A 4/25/2025    Procedure: Colonoscopy;  Surgeon: Travis Majano MD;  Location:  GI    ORTHOPEDIC SURGERY      shoulder labral repair     THYROIDECTOMY  12/9/2013    Procedure: THYROIDECTOMY;  Total Thyroidectomy, Central Neck Dissection ;  Surgeon: Pillo Henley MD;  Location: [facility]         Current Outpatient Medications:     albuterol (PROAIR HFA/PROVENTIL HFA/VENTOLIN HFA) 108 (90 Base) MCG/ACT inhaler, Inhale 2 puffs into the lungs every 6 hours as needed for shortness of breath / dyspnea or wheezing, Disp: 18 g, Rfl: 3    EPINEPHrine (ANY BX GENERIC EQUIV) 0.3 MG/0.3ML injection 2-pack, Inject 0.3 mLs (0.3 mg) into the muscle as needed for anaphylaxis., Disp: 2 each, Rfl: 2    fexofenadine (ALLEGRA) 180 MG tablet, Take 180 mg by mouth daily., Disp: , Rfl:     fluticasone-salmeterol (ADVAIR) 100-50 MCG/ACT inhaler, Inhale 1 puff into the lungs every 12 hours., Disp: 3 each, Rfl: 3    levothyroxine (SYNTHROID/LEVOTHROID) 125 MCG tablet, MON to SAT 2 tablet/day; SUN 1.5 tablet (Patient taking differently: Take 125 mcg by mouth daily. MON to SAT 2 tablet/day; SUN 1.5 tablet), Disp: 162 tablet, Rfl: 1    triamcinolone (KENALOG) 0.1 % external ointment, Apply topically 2 times daily., Disp: 80 g, Rfl: 1    fluticasone-salmeterol (ADVAIR) 100-50 MCG/ACT inhaler, Inhale 1 puff into the lungs every 12 hours, Disp: 1 each, Rfl: 4    Probiotic Product (PROBIOTIC ADVANCED PO), , Disp: , Rfl:     short ragweed pollen allergen extract (RAGWITEK) 12 AMB A 1-U SUBL sublingual tablet, Place 1 tablet under the tongue daily., Disp: 30 tablet, Rfl: 11  No Known Allergies      EXAM:   /82 (BP Location: Left arm, Patient Position: Sitting, Cuff Size: Adult Large)   Pulse 64   Wt 111.2 kg (245 lb 1.6 oz)   SpO2 97%   BMI 27.61 kg/m      Constitutional:       General: He is not in acute distress.     Appearance: Normal appearance. He is not ill-appearing.   HENT:      Head: Normocephalic and atraumatic.     Eyes:     General:         Right eye: No discharge.         Left eye: No discharge.   Pulmonary:      Effort: Pulmonary effort is normal.      Breath  sounds: Normal breath sounds. No wheezing or rhonchi.   Skin:     General: Skin is warm.      Findings: He does have some eczematous changes in bilateral arms.  Neurological:      General: No focal deficit present.      Mental Status: He is alert. Mental status is at baseline.   Psychiatric:         Mood and Affect: Mood normal.         Behavior: Behavior normal.      WORKUP: Drug Challenge, he was given Ragwitek and observed for 30 minutes.  He did not have any symptoms.    ASSESSMENT/PLAN:  José Augustine is a 49 year old male seen today for evaluation of seasonal allergic rhinitis known to be allergic to ragweed and Alternaria.  He received his first dose of Ragwitek in the office.  An EpiPen was prescribed.  Ragwitek will continue at home.  He is aware that needs to dissolve under the tongue for 1 minute.  No food or drink for 5 minutes.  Will continue daily for 3 years.  Will follow-up in 1 year to see how he responded to the Ragwitek.    Recommended use of triamcinolone for the atopic dermatitis.  He will continue with the Wixela twice daily for the asthma.    Thank you for allowing me to participate in the care of José Augustine.      I spent 30 minutes on the date of the encounter doing chart review, history and exam, documentation and further coordination as noted above exclusive of separately reported interpretations    Sergio Dorantes MD  Allergy/Immunology  [facility]

## 2025-06-23 ENCOUNTER — TELEPHONE (OUTPATIENT)
Dept: ENDOCRINOLOGY | Facility: CLINIC | Age: 50
End: 2025-06-23
Payer: COMMERCIAL

## 2025-06-23 NOTE — TELEPHONE ENCOUNTER
Patient confirmed scheduled appointment:  Labs 01/13/2026 at  LAB  RTC with Caity 02/03/2026 at 4:00 virtual  Visit type: RETURN THYROID CANCER  Provider: Nettie Carolina MD  Location: [facility] DIABETES  Testing/imaging: N/A  Additional notes: Scheduled per danny Cee on 6/23/2025 at 9:15 AM

## 2025-07-13 DIAGNOSIS — C73 PAPILLARY THYROID CARCINOMA (H): ICD-10-CM

## 2025-07-13 DIAGNOSIS — E89.0 POSTSURGICAL HYPOTHYROIDISM: ICD-10-CM

## 2025-07-14 RX ORDER — LEVOTHYROXINE SODIUM 125 UG/1
TABLET ORAL
Qty: 162 TABLET | Refills: 4 | Status: SHIPPED | OUTPATIENT
Start: 2025-07-14

## (undated) RX ORDER — FENTANYL CITRATE 50 UG/ML
INJECTION, SOLUTION INTRAMUSCULAR; INTRAVENOUS
Status: DISPENSED
Start: 2025-04-25